# Patient Record
Sex: MALE | Race: WHITE | NOT HISPANIC OR LATINO | Employment: OTHER | URBAN - METROPOLITAN AREA
[De-identification: names, ages, dates, MRNs, and addresses within clinical notes are randomized per-mention and may not be internally consistent; named-entity substitution may affect disease eponyms.]

---

## 2017-05-01 ENCOUNTER — OFFICE VISIT (OUTPATIENT)
Dept: FAMILY MEDICINE | Facility: CLINIC | Age: 62
End: 2017-05-01
Payer: COMMERCIAL

## 2017-05-01 VITALS
OXYGEN SATURATION: 96 % | HEIGHT: 73 IN | BODY MASS INDEX: 30.45 KG/M2 | DIASTOLIC BLOOD PRESSURE: 98 MMHG | HEART RATE: 90 BPM | TEMPERATURE: 98 F | WEIGHT: 229.75 LBS | SYSTOLIC BLOOD PRESSURE: 142 MMHG

## 2017-05-01 DIAGNOSIS — E66.9 OBESITY (BMI 30.0-34.9): ICD-10-CM

## 2017-05-01 DIAGNOSIS — R10.30 LOWER ABDOMINAL PAIN: ICD-10-CM

## 2017-05-01 DIAGNOSIS — K29.00 ACUTE GASTRITIS WITHOUT HEMORRHAGE, UNSPECIFIED GASTRITIS TYPE: ICD-10-CM

## 2017-05-01 DIAGNOSIS — E78.00 PURE HYPERCHOLESTEROLEMIA: ICD-10-CM

## 2017-05-01 DIAGNOSIS — R03.0 ELEVATED BLOOD PRESSURE READING WITHOUT DIAGNOSIS OF HYPERTENSION: Primary | ICD-10-CM

## 2017-05-01 PROCEDURE — 99214 OFFICE O/P EST MOD 30 MIN: CPT | Mod: S$GLB,,, | Performed by: INTERNAL MEDICINE

## 2017-05-01 PROCEDURE — 99999 PR PBB SHADOW E&M-EST. PATIENT-LVL III: CPT | Mod: PBBFAC,,, | Performed by: INTERNAL MEDICINE

## 2017-05-01 PROCEDURE — 1160F RVW MEDS BY RX/DR IN RCRD: CPT | Mod: S$GLB,,, | Performed by: INTERNAL MEDICINE

## 2017-05-01 NOTE — ASSESSMENT & PLAN NOTE
The patient is asked to make an attempt to improve diet and exercise patterns to aid in medical management of this problem.

## 2017-05-01 NOTE — PROGRESS NOTES
Chief Complaint  Chief Complaint   Patient presents with    Establish Care    Abdominal Pain       HPI  Ron Christina is a 61 y.o. male with multiple medical diagnoses as listed in the medical history and problem list that presents for abdominal pain for years.  Pt is known to me with his last appointment 11/16/2016.  The patient was previously followed by one of my partners that is no longer at this practice .  I have reviewed their most recent previous OV with their previous PCP, most recent labs and most recent imaging studies and interpreted them myself.  I had met him once before for low back pain.  He has a history of low abdominal pain that started in 2015.  He drives a lot for work and also sits for prolonged periods of time.  Feels that the pain is at his belt line.  He rates the pain at a <1/10.  No numbness or tingling.  Denies any swelling from this similar to his previous hernia repair.  Denies unintentional weight loss, BRBPR, melena, pelvic pain, severe back pain.       PAST MEDICAL HISTORY:  Past Medical History:   Diagnosis Date    Allergy        PAST SURGICAL HISTORY:  Past Surgical History:   Procedure Laterality Date    HERNIA REPAIR         SOCIAL HISTORY:  Social History     Social History    Marital status:      Spouse name: N/A    Number of children: N/A    Years of education: N/A     Occupational History    Not on file.     Social History Main Topics    Smoking status: Never Smoker    Smokeless tobacco: Never Used    Alcohol use Yes    Drug use: No    Sexual activity: Not on file     Other Topics Concern    Not on file     Social History Narrative    No narrative on file       FAMILY HISTORY:  Family History   Problem Relation Age of Onset    Alcohol abuse Mother     Asthma Father        ALLERGIES AND MEDICATIONS: updated and reviewed.  Review of patient's allergies indicates:   Allergen Reactions    Penicillins Other (See Comments)     Current Outpatient  "Prescriptions   Medication Sig Dispense Refill    diclofenac (VOLTAREN) 50 MG EC tablet Take 1 tablet (50 mg total) by mouth 2 (two) times daily. Take with food 60 tablet 1    dietary supplement Powd Take by mouth.      pravastatin (PRAVACHOL) 20 MG tablet Take 1 tablet (20 mg total) by mouth once daily. 90 tablet 3     No current facility-administered medications for this visit.          ROS  Review of Systems   Constitutional: Negative for chills, fever and unexpected weight change.   HENT: Negative for congestion, dental problem, ear pain, hearing loss, rhinorrhea, sore throat and trouble swallowing.    Eyes: Negative for discharge, redness and visual disturbance.   Respiratory: Negative for cough, chest tightness, shortness of breath and wheezing.    Cardiovascular: Negative for chest pain, palpitations and leg swelling.   Gastrointestinal: Positive for abdominal pain. Negative for constipation, diarrhea, nausea and vomiting.   Endocrine: Negative for polydipsia, polyphagia and polyuria.   Genitourinary: Negative for decreased urine volume, dysuria and hematuria.   Neurological: Negative for dizziness, weakness, light-headedness and headaches.   Psychiatric/Behavioral: Negative for sleep disturbance.           Physical Exam  Vitals:    05/01/17 0940   BP: (!) 142/98   BP Location: Left arm   Patient Position: Sitting   BP Method: Manual   Pulse: 90   Temp: 97.8 °F (36.6 °C)   TempSrc: Oral   SpO2: 96%   Weight: 104.2 kg (229 lb 11.5 oz)   Height: 6' 1" (1.854 m)    Body mass index is 30.31 kg/(m^2).  Weight: 104.2 kg (229 lb 11.5 oz)   Height: 6' 1" (185.4 cm)   General appearance: alert, appears stated age, cooperative and no distress  Head: Normocephalic, without obvious abnormality, atraumatic  Eyes: PERRL EOMI conjunctiva clear  Neck: no adenopathy, no carotid bruit, no JVD, supple, symmetrical, trachea midline and thyroid not enlarged, symmetric, no tenderness/mass/nodules  Lungs: clear to auscultation " bilaterally  Heart: regular rate and rhythm, S1, S2 normal, no murmur, click, rub or gallop  Abdomen: soft BS+  mild TTP in the epigastric region without rebound or guarding.  Otherwise no TTP.  no distention   Extremities: extremities normal, atraumatic, no cyanosis or edema  Pulses: 2+ and symmetric  Neurologic: Grossly normal        Health Maintenance       Date Due Completion Date    TETANUS VACCINE 7/28/1972 ---    Colonoscopy 7/28/2004 ---    Influenza Vaccine 8/1/2017 11/16/2016 (Declined)    Override on 11/16/2016: Declined    Override on 9/16/2015: Declined    Lipid Panel 10/21/2021 10/21/2016            Assessment & Plan  Problem List Items Addressed This Visit        Fluids/Electrolytes/Nutrition/GI    Hyperlipidemia    Current Assessment & Plan     Not taking his statin.  He has been working on diet and exercise.  Recheck at routine physical         Obesity (BMI 30.0-34.9)    Current Assessment & Plan     The patient is asked to make an attempt to improve diet and exercise patterns to aid in medical management of this problem.             Other    Elevated blood pressure reading without diagnosis of hypertension - Primary    Overview     Good home BP log         Current Assessment & Plan     Patient is asked to monitor BP at home or work, several times per month and return with written values at next office visit.             Other Visit Diagnoses     Lower abdominal pain    -  Unclear etiology at this time.  Leading hypothesis is some femoral nerve irritation.  Monitor.  If worsens or fails to improve will get CT abdomen.         Acute gastritis - counseled on trial of H2 blockers. He prefers to try Probiotics first which I feel is safe.       Health Maintenance reviewed, Reports C-scope at 55 y/o.  Requesting records. .    Follow-up: Return in about 6 months (around 11/1/2017) for Routine Physical.

## 2017-05-01 NOTE — ASSESSMENT & PLAN NOTE
Patient is asked to monitor BP at home or work, several times per month and return with written values at next office visit.

## 2017-06-20 ENCOUNTER — OFFICE VISIT (OUTPATIENT)
Dept: FAMILY MEDICINE | Facility: CLINIC | Age: 62
End: 2017-06-20
Payer: COMMERCIAL

## 2017-06-20 ENCOUNTER — PATIENT MESSAGE (OUTPATIENT)
Dept: FAMILY MEDICINE | Facility: CLINIC | Age: 62
End: 2017-06-20

## 2017-06-20 ENCOUNTER — TELEPHONE (OUTPATIENT)
Dept: FAMILY MEDICINE | Facility: CLINIC | Age: 62
End: 2017-06-20

## 2017-06-20 ENCOUNTER — HOSPITAL ENCOUNTER (OUTPATIENT)
Dept: RADIOLOGY | Facility: HOSPITAL | Age: 62
Discharge: HOME | End: 2017-06-20
Attending: INTERNAL MEDICINE
Payer: COMMERCIAL

## 2017-06-20 ENCOUNTER — LAB VISIT (OUTPATIENT)
Dept: LAB | Facility: HOSPITAL | Age: 62
End: 2017-06-20
Attending: INTERNAL MEDICINE
Payer: COMMERCIAL

## 2017-06-20 VITALS
TEMPERATURE: 98 F | SYSTOLIC BLOOD PRESSURE: 124 MMHG | OXYGEN SATURATION: 98 % | HEIGHT: 72 IN | BODY MASS INDEX: 32.76 KG/M2 | HEART RATE: 101 BPM | DIASTOLIC BLOOD PRESSURE: 90 MMHG | WEIGHT: 241.88 LBS

## 2017-06-20 DIAGNOSIS — M79.89 RIGHT LEG SWELLING: ICD-10-CM

## 2017-06-20 DIAGNOSIS — M79.661 RIGHT CALF PAIN: ICD-10-CM

## 2017-06-20 DIAGNOSIS — I82.443 ACUTE DEEP VEIN THROMBOSIS (DVT) OF TIBIAL VEIN OF BOTH LOWER EXTREMITIES: ICD-10-CM

## 2017-06-20 DIAGNOSIS — M79.661 RIGHT CALF PAIN: Primary | ICD-10-CM

## 2017-06-20 LAB
ALBUMIN SERPL BCP-MCNC: 3.6 G/DL
ALP SERPL-CCNC: 64 U/L
ALT SERPL W/O P-5'-P-CCNC: 28 U/L
ANION GAP SERPL CALC-SCNC: 11 MMOL/L
AST SERPL-CCNC: 22 U/L
BASOPHILS # BLD AUTO: 0.05 K/UL
BASOPHILS NFR BLD: 0.6 %
BILIRUB SERPL-MCNC: 0.7 MG/DL
BUN SERPL-MCNC: 24 MG/DL
CALCIUM SERPL-MCNC: 9.6 MG/DL
CHLORIDE SERPL-SCNC: 107 MMOL/L
CO2 SERPL-SCNC: 22 MMOL/L
CREAT SERPL-MCNC: 1 MG/DL
DIFFERENTIAL METHOD: ABNORMAL
EOSINOPHIL # BLD AUTO: 0.3 K/UL
EOSINOPHIL NFR BLD: 3.2 %
ERYTHROCYTE [DISTWIDTH] IN BLOOD BY AUTOMATED COUNT: 13.4 %
EST. GFR  (AFRICAN AMERICAN): >60 ML/MIN/1.73 M^2
EST. GFR  (NON AFRICAN AMERICAN): >60 ML/MIN/1.73 M^2
GLUCOSE SERPL-MCNC: 95 MG/DL
HCT VFR BLD AUTO: 48.9 %
HGB BLD-MCNC: 16.7 G/DL
LYMPHOCYTES # BLD AUTO: 2.4 K/UL
LYMPHOCYTES NFR BLD: 27.4 %
MCH RBC QN AUTO: 32.7 PG
MCHC RBC AUTO-ENTMCNC: 34.2 %
MCV RBC AUTO: 96 FL
MONOCYTES # BLD AUTO: 0.9 K/UL
MONOCYTES NFR BLD: 10.3 %
NEUTROPHILS # BLD AUTO: 5 K/UL
NEUTROPHILS NFR BLD: 58 %
PLATELET # BLD AUTO: 198 K/UL
PMV BLD AUTO: 10.5 FL
POTASSIUM SERPL-SCNC: 4.7 MMOL/L
PROT SERPL-MCNC: 7.5 G/DL
RBC # BLD AUTO: 5.11 M/UL
SODIUM SERPL-SCNC: 140 MMOL/L
WBC # BLD AUTO: 8.62 K/UL

## 2017-06-20 PROCEDURE — 93971 EXTREMITY STUDY: CPT | Mod: TC

## 2017-06-20 PROCEDURE — 36415 COLL VENOUS BLD VENIPUNCTURE: CPT | Mod: PO

## 2017-06-20 PROCEDURE — 80053 COMPREHEN METABOLIC PANEL: CPT

## 2017-06-20 PROCEDURE — 99214 OFFICE O/P EST MOD 30 MIN: CPT | Mod: S$GLB,,, | Performed by: INTERNAL MEDICINE

## 2017-06-20 PROCEDURE — 85025 COMPLETE CBC W/AUTO DIFF WBC: CPT

## 2017-06-20 PROCEDURE — 99999 PR PBB SHADOW E&M-EST. PATIENT-LVL III: CPT | Mod: PBBFAC,,, | Performed by: INTERNAL MEDICINE

## 2017-06-20 PROCEDURE — 93971 EXTREMITY STUDY: CPT | Mod: 26,,, | Performed by: RADIOLOGY

## 2017-06-20 NOTE — PROGRESS NOTES
Chief Complaint  Chief Complaint   Patient presents with    Leg Pain     Right/2 days    Heel Pain     1 day       HPI  Ron Christina is a 61 y.o. male with multiple medical diagnoses as listed in the medical history and problem list that presents for right leg and heel pain for 2 days.  Pt is known to me with his last appointment 5/1/2017.  Reports that he was recently traveling on a cruise and by plane.  Plane ride was ~2 hours.  He also got a severe sunburn of the BLE but R>L.  2 days ago he noticed some sudden onset of calf pain while standing up and grilling.  Noted that RLE was swollen as well.  Wife measured it and it was 1.5inches larger.  Pain is in the calf and goes down to the plantar fascia.  No CP, palpitations, SOB.  He took to aleve PM without much relief.  Swelling has improved slightly.       PAST MEDICAL HISTORY:  Past Medical History:   Diagnosis Date    Allergy        PAST SURGICAL HISTORY:  Past Surgical History:   Procedure Laterality Date    HERNIA REPAIR         SOCIAL HISTORY:  Social History     Social History    Marital status:      Spouse name: N/A    Number of children: N/A    Years of education: N/A     Occupational History    Not on file.     Social History Main Topics    Smoking status: Never Smoker    Smokeless tobacco: Never Used    Alcohol use Yes    Drug use: No    Sexual activity: Not on file     Other Topics Concern    Not on file     Social History Narrative    No narrative on file       FAMILY HISTORY:  Family History   Problem Relation Age of Onset    Alcohol abuse Mother     Asthma Father        ALLERGIES AND MEDICATIONS: updated and reviewed.  Review of patient's allergies indicates:   Allergen Reactions    Penicillins Other (See Comments)     Current Outpatient Prescriptions   Medication Sig Dispense Refill    diclofenac (VOLTAREN) 50 MG EC tablet Take 1 tablet (50 mg total) by mouth 2 (two) times daily. Take with food 60 tablet 1    dietary  supplement Powd Take by mouth.      pravastatin (PRAVACHOL) 20 MG tablet Take 1 tablet (20 mg total) by mouth once daily. 90 tablet 3     No current facility-administered medications for this visit.          ROS  Review of Systems   Constitutional: Negative for chills, fever and unexpected weight change.   Respiratory: Negative for cough, chest tightness, shortness of breath and wheezing.    Cardiovascular: Positive for leg swelling. Negative for chest pain and palpitations.   Gastrointestinal: Negative for abdominal pain, constipation, diarrhea, nausea and vomiting.   Musculoskeletal: Positive for myalgias. Negative for arthralgias.   Skin: Positive for color change. Negative for rash.   Neurological: Negative for dizziness, weakness, light-headedness and headaches.           Physical Exam  Vitals:    06/20/17 0822   BP: (!) 124/90   BP Location: Left arm   Patient Position: Sitting   BP Method: Manual   Pulse: 101   Temp: 97.8 °F (36.6 °C)   TempSrc: Oral   SpO2: 98%   Weight: 109.7 kg (241 lb 13.5 oz)   Height: 6' (1.829 m)    Body mass index is 32.8 kg/m².  Weight: 109.7 kg (241 lb 13.5 oz)   Height: 6' (182.9 cm)   General appearance: alert, appears stated age, cooperative and no distress  Head: Normocephalic, without obvious abnormality, atraumatic  Eyes: PERRL EOMI conjunctiva clear  Lungs: regular rate and pattern symmetric excursion.  normal work of breathing  Heart: regular rate and rhythm  Extremities: + swelling of the RLE compared to the LLE.  Difference of roughly 1-1.5 inches.  Mild TTP of the calf and mild pain with dorsiflexion of the foot.   No pitting edema.  No direct stevie tenderness  Pulses: 2+ and symmetric  Skin: BLE peeling from sunburn R>L.  RLE also erythematous but unclear if this is due to sunburn  Neurologic: Mental status: Alert, oriented, thought content appropriate  Sensory: normal  Motor:grossly normal  Coordination: normal  Gait: Normal  Symmetric facial movements palate elevated  symmetrically tongue midline       Health Maintenance       Date Due Completion Date    TETANUS VACCINE 07/28/1972 ---    Colonoscopy 07/28/2004 ---    Influenza Vaccine 08/01/2017 11/16/2016 (Declined)    Override on 11/16/2016: Declined    Override on 9/16/2015: Declined    Lipid Panel 10/21/2021 10/21/2016            Assessment & Plan  Problem List Items Addressed This Visit     None      Visit Diagnoses     Right calf pain    -  Primary  -    Check stat US given sudden onset of pain and swelling.  Color change difficult to assess given recent sunburn.  I have already counseled the patient on direct thrombin inhibitors should this be needed.  Will plan to check CBC and CMP and start him on therapy as an outpatient unless there is a very large thrombotic load.    Relevant Orders    US Lower Extremity Veins Right    Right leg swelling    -  As above    Relevant Orders    US Lower Extremity Veins Right            Health Maintenance reviewed, deferred.    Follow-up: No Follow-up on file.

## 2017-06-20 NOTE — TELEPHONE ENCOUNTER
I called and spoke to the patient regarding his US finding on DVT.  We will start him on Xarelto and see him back on July 6th at 11AM for f/u and repeat labs

## 2017-06-21 ENCOUNTER — PATIENT MESSAGE (OUTPATIENT)
Dept: FAMILY MEDICINE | Facility: CLINIC | Age: 62
End: 2017-06-21

## 2017-06-21 DIAGNOSIS — I82.443 ACUTE DEEP VEIN THROMBOSIS (DVT) OF TIBIAL VEIN OF BOTH LOWER EXTREMITIES: Primary | ICD-10-CM

## 2017-06-21 RX ORDER — TRAMADOL HYDROCHLORIDE 50 MG/1
TABLET ORAL
Qty: 45 TABLET | Refills: 0 | Status: SHIPPED | OUTPATIENT
Start: 2017-06-21 | End: 2017-07-06

## 2017-06-22 ENCOUNTER — PATIENT MESSAGE (OUTPATIENT)
Dept: FAMILY MEDICINE | Facility: CLINIC | Age: 62
End: 2017-06-22

## 2017-06-22 ENCOUNTER — TELEPHONE (OUTPATIENT)
Dept: FAMILY MEDICINE | Facility: CLINIC | Age: 62
End: 2017-06-22

## 2017-06-22 NOTE — TELEPHONE ENCOUNTER
----- Message from Chris Bartlett sent at 6/20/2017  2:11 PM CDT -----  Contact: Self  Pt states he has blood clots and would like to discuss with . Pt can be reached @ [phone]

## 2017-06-26 ENCOUNTER — TELEPHONE (OUTPATIENT)
Dept: FAMILY MEDICINE | Facility: CLINIC | Age: 62
End: 2017-06-26

## 2017-06-26 ENCOUNTER — PATIENT MESSAGE (OUTPATIENT)
Dept: FAMILY MEDICINE | Facility: CLINIC | Age: 62
End: 2017-06-26

## 2017-06-26 NOTE — TELEPHONE ENCOUNTER
Patient advised he can resume his daily activities, and he can consume alcohol in moderation per Dr. Rae.

## 2017-06-26 NOTE — TELEPHONE ENCOUNTER
Patient advised he can resume his daily activities, and he can consume alcohol in moderation per Dr. Rae.            Ron Rae MD 24 minutes ago (10:50 AM)         Hey Doc, I know me again. 2 questions. 1- is there any problem with me getting in the pool? My wife is concerned about the water pressure on my leg. 2- Need to know if I am able to consume alcohol while taking this medicine. Also when will I be able to cut my grass, my wife is concerned about this. She tells me NO.     Ron Christina

## 2017-06-30 ENCOUNTER — OFFICE VISIT (OUTPATIENT)
Dept: FAMILY MEDICINE | Facility: CLINIC | Age: 62
End: 2017-06-30
Payer: COMMERCIAL

## 2017-06-30 ENCOUNTER — TELEPHONE (OUTPATIENT)
Dept: FAMILY MEDICINE | Facility: CLINIC | Age: 62
End: 2017-06-30

## 2017-06-30 VITALS
SYSTOLIC BLOOD PRESSURE: 129 MMHG | TEMPERATURE: 98 F | BODY MASS INDEX: 32.76 KG/M2 | DIASTOLIC BLOOD PRESSURE: 78 MMHG | OXYGEN SATURATION: 95 % | WEIGHT: 241.88 LBS | HEIGHT: 72 IN | HEART RATE: 99 BPM

## 2017-06-30 DIAGNOSIS — E78.00 PURE HYPERCHOLESTEROLEMIA: ICD-10-CM

## 2017-06-30 DIAGNOSIS — R07.9 CHEST PAIN, UNSPECIFIED TYPE: Primary | ICD-10-CM

## 2017-06-30 DIAGNOSIS — I82.443 ACUTE DEEP VEIN THROMBOSIS (DVT) OF TIBIAL VEIN OF BOTH LOWER EXTREMITIES: ICD-10-CM

## 2017-06-30 PROCEDURE — 99214 OFFICE O/P EST MOD 30 MIN: CPT | Mod: S$GLB,,, | Performed by: NURSE PRACTITIONER

## 2017-06-30 PROCEDURE — 93010 ELECTROCARDIOGRAM REPORT: CPT | Mod: S$GLB,,, | Performed by: INTERNAL MEDICINE

## 2017-06-30 PROCEDURE — 93005 ELECTROCARDIOGRAM TRACING: CPT | Mod: S$GLB,,, | Performed by: NURSE PRACTITIONER

## 2017-06-30 PROCEDURE — 99999 PR PBB SHADOW E&M-EST. PATIENT-LVL IV: CPT | Mod: PBBFAC,,, | Performed by: NURSE PRACTITIONER

## 2017-06-30 NOTE — TELEPHONE ENCOUNTER
C/o left chest dull pain intermittent 1 on pain scale.patient states he's concerned thinking it may be due to Xarelto. He has no radiation,sob he states this has been going on at least 3 days advised patient if pain increases go to ED. Please advise

## 2017-06-30 NOTE — PATIENT INSTRUCTIONS
Noncardiac Chest Pain    Based on your visit today, the health care provider doesnt know what is causing your chest pain. In most cases, people who come to the emergency department with chest pain dont have a problem with their heart. Instead, the pain is caused by other conditions. These may be problems with the lungs, muscles, bones, digestive tract, nerves, or mental health.  Lung problems  · Inflammation around the lungs (pleurisy)  · Collapsed lung (pneumothorax)  · Fluid around the lungs (pleural effusion)  · Lung cancer. This is a rare cause of chest pain.  Muscle or bone problems  · Inflamed cartilage between the ribs (pleurisy)  · Fibromyalgia  · Rheumatoid arthritis  Digestive system problems  · Reflux  · Stomach ulcer  · Spasms of the esophagus  · Gall stones  · Gallbladder inflammation  Mental health conditions  · Panic or anxiety attacks  · Emotional distress  Your condition doesnt seem serious and your pain doesnt appear to be coming from your heart. But sometimes the signs of a serious problem take more time to appear. Watch for the warning signs listed below.  Home care  Follow these guidelines when caring for yourself at home:  · Rest today and avoid strenuous activity.  · Take any prescribed medicine as directed.  Follow-up care  Follow up with your health care provider, or as advised, if you dont start to feel better within 24 hours.  When to seek medical advice  Call your health care provider right away if any of these occur:  · A change in the type of pain. Call if it feels different, becomes more serious, lasts longer, or begins to spread into your shoulder, arm, neck, jaw, or back.  · Shortness of breath  · You feel more pain when you breathe  · Cough with dark-colored mucus or blood  · Weakness, dizziness, or fainting  · Fever of 100.4ºF (38ºC) or higher, or as directed by your health care provider  · Swelling, pain, or redness in one leg  Date Last Reviewed: 11/24/2014  © 2865-1511  [employer]. [address] [place] [address], [place] [address]. All rights reserved. This information is not intended as a substitute for professional medical care. Always follow your healthcare professional's instructions.

## 2017-06-30 NOTE — PROGRESS NOTES
"History of Present Illness   Ron Christina is a 61 y.o. man with medical history as listed below who presents today with left sided chest pain x3-4 days. Pain is located on the left side and along left mid-axillary region. Pain is intermittent and described as a dull ache, "mild muscle spasm." The pain does not radiate. The pain lasts seconds and occurs about three to four times per day. The pain is not associated with shortness of breath, palpitations, lightheadedness, or syncope. Of note, patient was diagnosed with right DVT on 6/20/17. He is currently taking Xarelto. He has had no complications since diagnosis. He states that some years ago he had similar symptoms and was told it may be arthritis. He normally takes NSAIDS for this pain but has been unable to due to being on Xarelto. He has no additional complaints and is otherwise healthy on today's visit.      Past Medical History:   Diagnosis Date    Allergy        Past Surgical History:   Procedure Laterality Date    HERNIA REPAIR         Social History     Social History    Marital status:      Spouse name: N/A    Number of children: N/A    Years of education: N/A     Social History Main Topics    Smoking status: Never Smoker    Smokeless tobacco: Never Used    Alcohol use Yes    Drug use: No    Sexual activity: Not Asked     Other Topics Concern    None     Social History Narrative    None       Family History   Problem Relation Age of Onset    Alcohol abuse Mother     Asthma Father        Review of Systems  Review of Systems   Respiratory: Negative for shortness of breath.    Cardiovascular: Positive for chest pain and leg swelling (right). Negative for palpitations, orthopnea and claudication.   Neurological: Negative for dizziness and loss of consciousness.     A complete review of systems was otherwise negative.    Physical Exam  /78 (BP Location: Right arm, Patient Position: Sitting, BP Method: Manual)   Pulse 99   Temp " 98.3 °F (36.8 °C) (Oral)   Ht 6' (1.829 m)   Wt 109.7 kg (241 lb 13.5 oz)   SpO2 95%   BMI 32.80 kg/m²   General appearance: alert, appears stated age, cooperative and no distress  Lungs: clear to auscultation bilaterally  Chest wall: left sided chest wall tenderness, reproducible with movement and touch  Heart: regular rate and rhythm, S1, S2 normal, no murmur, click, rub or gallop  Extremities: Positive DVT to RLE, Right calf/ankle swelling, decresed from last visit.  Pulses: 2+ and symmetric  Skin: Skin color, texture, turgor normal. No rashes or lesions  Neurologic: Grossly normal    Assessment/Plan  Chest pain, unspecified type  EKG shows NSR with no ST or T-wave abnormality.  Pain more consistent with musculoskeletal etiology.  We discussed at length ER precautions.  Follow-up with Dr. Rae as scheduled on 7/6/17.  -     IN OFFICE EKG 12-LEAD (to Muse)    Acute deep vein thrombosis (DVT) of tibial vein of both lower extremities  The current medical regimen is effective;  continue present plan and medications.  Follow-up with Dr. Rae as scheduled on 7/6/17 for re-evaluation.    Pure hypercholesterolemia  The current medical regimen is effective;  continue present plan and medications.      Go to ER for any change in quality of chest pain especially if associated with shortness of breath, palpitations, dizziness, LOC, or other worrisome symptoms.  He has verbalized understanding and is in agreement with plan of care.  Return in one week for follow-up with PCP

## 2017-06-30 NOTE — TELEPHONE ENCOUNTER
----- Message from Elaine Augila sent at 6/30/2017  2:36 PM CDT -----  Contact: Self   Patient says he need to speak with you to discuss some aches he has been having . Please call at   [phone]

## 2017-07-06 ENCOUNTER — LAB VISIT (OUTPATIENT)
Dept: LAB | Facility: HOSPITAL | Age: 62
End: 2017-07-06
Attending: INTERNAL MEDICINE
Payer: COMMERCIAL

## 2017-07-06 ENCOUNTER — OFFICE VISIT (OUTPATIENT)
Dept: FAMILY MEDICINE | Facility: CLINIC | Age: 62
End: 2017-07-06
Payer: COMMERCIAL

## 2017-07-06 VITALS
OXYGEN SATURATION: 97 % | BODY MASS INDEX: 33.12 KG/M2 | HEIGHT: 72 IN | TEMPERATURE: 98 F | WEIGHT: 244.5 LBS | DIASTOLIC BLOOD PRESSURE: 86 MMHG | HEART RATE: 86 BPM | SYSTOLIC BLOOD PRESSURE: 124 MMHG

## 2017-07-06 DIAGNOSIS — I82.441 ACUTE DEEP VEIN THROMBOSIS (DVT) OF TIBIAL VEIN OF RIGHT LOWER EXTREMITY: ICD-10-CM

## 2017-07-06 DIAGNOSIS — I82.441 ACUTE DEEP VEIN THROMBOSIS (DVT) OF TIBIAL VEIN OF RIGHT LOWER EXTREMITY: Primary | ICD-10-CM

## 2017-07-06 LAB
ANION GAP SERPL CALC-SCNC: 9 MMOL/L
BASOPHILS # BLD AUTO: 0.03 K/UL
BASOPHILS NFR BLD: 0.4 %
BUN SERPL-MCNC: 25 MG/DL
CALCIUM SERPL-MCNC: 9.7 MG/DL
CHLORIDE SERPL-SCNC: 106 MMOL/L
CO2 SERPL-SCNC: 22 MMOL/L
CREAT SERPL-MCNC: 0.9 MG/DL
DIFFERENTIAL METHOD: ABNORMAL
EOSINOPHIL # BLD AUTO: 0.3 K/UL
EOSINOPHIL NFR BLD: 3.4 %
ERYTHROCYTE [DISTWIDTH] IN BLOOD BY AUTOMATED COUNT: 13.5 %
EST. GFR  (AFRICAN AMERICAN): >60 ML/MIN/1.73 M^2
EST. GFR  (NON AFRICAN AMERICAN): >60 ML/MIN/1.73 M^2
GLUCOSE SERPL-MCNC: 87 MG/DL
HCT VFR BLD AUTO: 46.3 %
HGB BLD-MCNC: 15.6 G/DL
LYMPHOCYTES # BLD AUTO: 3 K/UL
LYMPHOCYTES NFR BLD: 37.3 %
MCH RBC QN AUTO: 32.4 PG
MCHC RBC AUTO-ENTMCNC: 33.7 %
MCV RBC AUTO: 96 FL
MONOCYTES # BLD AUTO: 0.8 K/UL
MONOCYTES NFR BLD: 10 %
NEUTROPHILS # BLD AUTO: 3.9 K/UL
NEUTROPHILS NFR BLD: 48.5 %
PLATELET # BLD AUTO: 234 K/UL
PMV BLD AUTO: 10.6 FL
POTASSIUM SERPL-SCNC: 4.4 MMOL/L
RBC # BLD AUTO: 4.81 M/UL
SODIUM SERPL-SCNC: 137 MMOL/L
WBC # BLD AUTO: 7.98 K/UL

## 2017-07-06 PROCEDURE — 99999 PR PBB SHADOW E&M-EST. PATIENT-LVL III: CPT | Mod: PBBFAC,,, | Performed by: INTERNAL MEDICINE

## 2017-07-06 PROCEDURE — 36415 COLL VENOUS BLD VENIPUNCTURE: CPT | Mod: PO

## 2017-07-06 PROCEDURE — 99214 OFFICE O/P EST MOD 30 MIN: CPT | Mod: S$GLB,,, | Performed by: INTERNAL MEDICINE

## 2017-07-06 PROCEDURE — 85025 COMPLETE CBC W/AUTO DIFF WBC: CPT

## 2017-07-06 PROCEDURE — 80048 BASIC METABOLIC PNL TOTAL CA: CPT

## 2017-07-06 NOTE — PATIENT INSTRUCTIONS
We are changing to the once daily dosing once you take all of the 15mg tabs.  Your once daily dose will be 20mg tablets.

## 2017-07-06 NOTE — PROGRESS NOTES
Chief Complaint  Chief Complaint   Patient presents with    Deep Vein Thrombosis     F/U       HPI  Ron Christina is a 61 y.o. male with multiple medical diagnoses as listed in the medical history and problem list that presents for DVT follow up.  Pt is known to me with his last appointment 6/30/2017.  Seen at that time for MSK chest pain.  He iwll need to change Xarelto dosing next week to once daily dosing.  No CP, SOB, palpitations, hematuria, BRBPR.  He is walking and exercising.  Some mild swelling of the RLE with prolonged standing that resolves with elevation.  No pain.       PAST MEDICAL HISTORY:  Past Medical History:   Diagnosis Date    Allergy     Deep vein thrombosis        PAST SURGICAL HISTORY:  Past Surgical History:   Procedure Laterality Date    HERNIA REPAIR         SOCIAL HISTORY:  Social History     Social History    Marital status:      Spouse name: N/A    Number of children: N/A    Years of education: N/A     Occupational History    Not on file.     Social History Main Topics    Smoking status: Never Smoker    Smokeless tobacco: Never Used    Alcohol use Yes    Drug use: No    Sexual activity: Not on file     Other Topics Concern    Not on file     Social History Narrative    No narrative on file       FAMILY HISTORY:  Family History   Problem Relation Age of Onset    Alcohol abuse Mother     Asthma Father        ALLERGIES AND MEDICATIONS: updated and reviewed.  Review of patient's allergies indicates:   Allergen Reactions    Penicillins Other (See Comments)     Current Outpatient Prescriptions   Medication Sig Dispense Refill    rivaroxaban (XARELTO) 15 mg Tab Take 1 tablet (15 mg total) by mouth 2 (two) times daily with meals. 42 tablet 0    rivaroxaban (XARELTO) 20 mg Tab Take 1 tablet (20 mg total) by mouth once daily. 90 tablet 0     No current facility-administered medications for this visit.          ROS  Review of Systems   Constitutional: Negative for  chills, fever and unexpected weight change.   HENT: Negative for trouble swallowing.    Respiratory: Negative for cough, chest tightness, shortness of breath and wheezing.    Cardiovascular: Positive for leg swelling. Negative for chest pain and palpitations.   Gastrointestinal: Negative for abdominal pain, constipation, diarrhea, nausea and vomiting.   Endocrine: Negative for polydipsia.   Genitourinary: Negative for decreased urine volume, dysuria and hematuria.   Musculoskeletal: Negative for arthralgias and myalgias.   Skin: Negative for color change and rash.   Neurological: Negative for dizziness, weakness, light-headedness and headaches.           Physical Exam  Vitals:    07/06/17 1057 07/06/17 1125   BP: (!) 150/100 124/86   BP Location: Left arm    Patient Position: Sitting    BP Method: Manual    Pulse: 86    Temp: 97.9 °F (36.6 °C)    TempSrc: Oral    SpO2: 97%    Weight: 110.9 kg (244 lb 7.8 oz)    Height: 6' (1.829 m)     Body mass index is 33.16 kg/m².  Weight: 110.9 kg (244 lb 7.8 oz)   Height: 6' (182.9 cm)   General appearance: alert, appears stated age, cooperative and no distress  Head: Normocephalic, without obvious abnormality, atraumatic  Eyes: PERRL EOMI conjunctiva clear  Neck: no adenopathy, no carotid bruit, no JVD, supple, symmetrical, trachea midline and thyroid not enlarged, symmetric, no tenderness/mass/nodules  Lungs: clear to auscultation bilaterally  Heart: regular rate and rhythm, S1, S2 normal, no murmur, click, rub or gallop  Extremities: no ulcers, gangrene or trophic changes and RLE ~2cm larger than the LLE at the calf.  no TTP  Pulses: 2+ and symmetric  Neurologic: Mental status: Alert, oriented, thought content appropriate  Sensory: normal  Motor:grossly normal  Coordination: normal  Gait: Normal   Symmetric facial movements palate elevated symmetrically tongue midline         Health Maintenance       Date Due Completion Date    TETANUS VACCINE 07/28/1972 ---    Colonoscopy  07/28/2004 ---    Influenza Vaccine 08/01/2017 11/16/2016 (Declined)    Override on 11/16/2016: Declined    Override on 9/16/2015: Declined    Lipid Panel 10/21/2021 10/21/2016            Assessment & Plan  Problem List Items Addressed This Visit        Cardiac    Acute deep vein thrombosis (DVT) of tibial vein of right lower extremity - Primary    Current Assessment & Plan     Doing well.  Recheck labs.  Discussed with patient that his history is a bit unclear regarding whether this was a provoked or unprovoked DVT.  Will mostly likely plan for 6 months of anticoagulation then decide on stopping.  Also discussed possible Hematology referral.           Relevant Medications    rivaroxaban (XARELTO) 20 mg Tab    Other Relevant Orders    CBC auto differential    Basic metabolic panel      Other Visit Diagnoses    None.           Health Maintenance reviewed, deferred.    Follow-up: Return for 3-6 months.

## 2017-07-06 NOTE — ASSESSMENT & PLAN NOTE
Doing well.  Recheck labs.  Discussed with patient that his history is a bit unclear regarding whether this was a provoked or unprovoked DVT.  Will mostly likely plan for 6 months of anticoagulation then decide on stopping.  Also discussed possible Hematology referral.

## 2017-07-07 ENCOUNTER — OFFICE VISIT (OUTPATIENT)
Dept: FAMILY MEDICINE | Facility: CLINIC | Age: 62
End: 2017-07-07
Payer: COMMERCIAL

## 2017-07-07 VITALS
TEMPERATURE: 98 F | SYSTOLIC BLOOD PRESSURE: 134 MMHG | HEART RATE: 93 BPM | DIASTOLIC BLOOD PRESSURE: 102 MMHG | WEIGHT: 242.06 LBS | BODY MASS INDEX: 32.79 KG/M2 | OXYGEN SATURATION: 97 % | HEIGHT: 72 IN

## 2017-07-07 DIAGNOSIS — R03.0 ELEVATED BLOOD PRESSURE READING WITHOUT DIAGNOSIS OF HYPERTENSION: ICD-10-CM

## 2017-07-07 DIAGNOSIS — S83.421A SPRAIN OF LATERAL COLLATERAL LIGAMENT OF RIGHT KNEE, INITIAL ENCOUNTER: Primary | ICD-10-CM

## 2017-07-07 PROCEDURE — 99214 OFFICE O/P EST MOD 30 MIN: CPT | Mod: S$GLB,,, | Performed by: INTERNAL MEDICINE

## 2017-07-07 PROCEDURE — 99999 PR PBB SHADOW E&M-EST. PATIENT-LVL III: CPT | Mod: PBBFAC,,, | Performed by: INTERNAL MEDICINE

## 2017-07-07 NOTE — PROGRESS NOTES
Your lab results are stable.  Please continue your current medications and the dose change next week.  Please feel free to contact me with any questions or concerns.    Sincerely,  Selvin Rae  [email or web address]

## 2017-07-07 NOTE — PATIENT INSTRUCTIONS
Ice the knee for 15-20 minutes 2-3 times a day.  Tramadol is fine for pain control.      Salonpas makes a good over the counter anti-inflammatory patch or gel.

## 2017-07-07 NOTE — PROGRESS NOTES
"Chief Complaint  Chief Complaint   Patient presents with    Knee Pain     Right       HPI  Ron Christina is a 61 y.o. male with multiple medical diagnoses as listed in the medical history and problem list that presents for right knee pain since last night.  Pt is known to me with his last appointment 7/6/2017.  Was playing volleyball and landed funny and "tweaked" his right knee.  Pain with flexion.  Slightly swollen.  Felt ok while he had his knee brace on.  Worse this AM when he woke up to use the restroom.  No bruising.  Using his previous tramadol for pain control.        PAST MEDICAL HISTORY:  Past Medical History:   Diagnosis Date    Allergy     Deep vein thrombosis        PAST SURGICAL HISTORY:  Past Surgical History:   Procedure Laterality Date    HERNIA REPAIR         SOCIAL HISTORY:  Social History     Social History    Marital status:      Spouse name: N/A    Number of children: N/A    Years of education: N/A     Occupational History    Not on file.     Social History Main Topics    Smoking status: Never Smoker    Smokeless tobacco: Never Used    Alcohol use Yes    Drug use: No    Sexual activity: Not on file     Other Topics Concern    Not on file     Social History Narrative    No narrative on file       FAMILY HISTORY:  Family History   Problem Relation Age of Onset    Alcohol abuse Mother     Asthma Father        ALLERGIES AND MEDICATIONS: updated and reviewed.  Review of patient's allergies indicates:   Allergen Reactions    Penicillins Other (See Comments)     Current Outpatient Prescriptions   Medication Sig Dispense Refill    rivaroxaban (XARELTO) 15 mg Tab Take 1 tablet (15 mg total) by mouth 2 (two) times daily with meals. 42 tablet 0    rivaroxaban (XARELTO) 20 mg Tab Take 1 tablet (20 mg total) by mouth once daily. 90 tablet 0     No current facility-administered medications for this visit.          ROS  Review of Systems   Constitutional: Negative for chills, " fatigue and fever.   Cardiovascular: Negative for chest pain and palpitations.   Musculoskeletal: Positive for arthralgias, gait problem (due to pain) and joint swelling. Negative for back pain, myalgias and neck pain.   Skin: Negative for color change, pallor, rash and wound.   Neurological: Negative for weakness, numbness and headaches.           Physical Exam  Vitals:    07/07/17 1141   BP: (!) 134/102   BP Location: Left arm   Patient Position: Sitting   BP Method: Manual   Pulse: 93   Temp: 97.7 °F (36.5 °C)   TempSrc: Oral   SpO2: 97%   Weight: 109.8 kg (242 lb 1 oz)   Height: 6' (1.829 m)    Body mass index is 32.83 kg/m².  Weight: 109.8 kg (242 lb 1 oz)   Height: 6' (182.9 cm)   General appearance: alert, appears stated age, cooperative and no distress  Head: Normocephalic, without obvious abnormality, atraumatic  Eyes: PERRL EOMI conjunctiva clear  Extremities: extremities normal, atraumatic, no cyanosis or edema  Pulses: 2+ and symmetric  Skin: Skin color, texture, turgor normal. No rashes or lesions  Neurologic: Mental status: Alert, oriented, thought content appropriate  Sensory: normal  Motor:grossly normal  Coordination: normal  Gait: slight limp due to pain  Bilateral hips: No effusion erythema warmth or TTP.  FROM without pain.  Left knee: No effusion erythema warmth or TTP.  FROM without pain.  No MCL LCL or ACL laxity.  Right knee: Mild swelling.  + TTP over the LCL.  No erythema warmth.  FROM with pain on flexion.  No MCL LCL or ACL laxity. Cesar's negative.       Health Maintenance       Date Due Completion Date    TETANUS VACCINE 07/28/1972 ---    Colonoscopy 07/28/2004 ---    Influenza Vaccine 08/01/2017 11/16/2016 (Declined)    Override on 11/16/2016: Declined    Override on 9/16/2015: Declined    Lipid Panel 10/21/2021 10/21/2016            Assessment & Plan  Problem List Items Addressed This Visit        Other    Elevated blood pressure reading without diagnosis of hypertension     Overview     Good home BP log         Current Assessment & Plan     Multiple normal readings in the past.  In pain today.  Monitor           Other Visit Diagnoses     Sprain of lateral collateral ligament of right knee, initial encounter    -  Primary  -    RICE therapy.  May use tramadol script for pain.  Counseled on topical NSAIDs.  No signs of hemarthrosis today.             Health Maintenance reviewed, deferred.    Follow-up: Return if symptoms worsen or fail to improve.

## 2017-07-14 ENCOUNTER — PATIENT MESSAGE (OUTPATIENT)
Dept: FAMILY MEDICINE | Facility: CLINIC | Age: 62
End: 2017-07-14

## 2017-08-20 ENCOUNTER — PATIENT MESSAGE (OUTPATIENT)
Dept: FAMILY MEDICINE | Facility: CLINIC | Age: 62
End: 2017-08-20

## 2017-09-15 ENCOUNTER — OFFICE VISIT (OUTPATIENT)
Dept: FAMILY MEDICINE | Facility: CLINIC | Age: 62
End: 2017-09-15
Payer: COMMERCIAL

## 2017-09-15 VITALS
DIASTOLIC BLOOD PRESSURE: 75 MMHG | HEART RATE: 81 BPM | WEIGHT: 238.75 LBS | OXYGEN SATURATION: 97 % | TEMPERATURE: 99 F | BODY MASS INDEX: 32.34 KG/M2 | SYSTOLIC BLOOD PRESSURE: 130 MMHG | HEIGHT: 72 IN

## 2017-09-15 DIAGNOSIS — I82.441 ACUTE DEEP VEIN THROMBOSIS (DVT) OF TIBIAL VEIN OF RIGHT LOWER EXTREMITY: ICD-10-CM

## 2017-09-15 DIAGNOSIS — S46.212A STRAIN OF MUSCLE, FASCIA AND TENDON OF OTHER PARTS OF BICEPS, LEFT ARM, INITIAL ENCOUNTER: Primary | ICD-10-CM

## 2017-09-15 PROCEDURE — 99213 OFFICE O/P EST LOW 20 MIN: CPT | Mod: S$GLB,,, | Performed by: NURSE PRACTITIONER

## 2017-09-15 PROCEDURE — 99999 PR PBB SHADOW E&M-EST. PATIENT-LVL III: CPT | Mod: PBBFAC,,, | Performed by: NURSE PRACTITIONER

## 2017-09-15 PROCEDURE — 3008F BODY MASS INDEX DOCD: CPT | Mod: S$GLB,,, | Performed by: NURSE PRACTITIONER

## 2017-09-15 RX ORDER — DICLOFENAC SODIUM 10 MG/G
2 GEL TOPICAL 4 TIMES DAILY
Qty: 100 G | Refills: 0 | Status: SHIPPED | OUTPATIENT
Start: 2017-09-15 | End: 2018-01-16

## 2017-09-15 NOTE — PROGRESS NOTES
"History of Present Illness   Ron Christina is a 62 y.o. man with medical history as listed below who presents today for left arm pain. He states he was playing volleyball yesterday and injured arm near antecubital area going to a low spike. He states he felt immediate pain with a "shock like" sensation. He immediately took Tylenol and started to ice the area. He states today pain is only present with external rotation of the arm and is only about a 2/10. He describes the pain as a tightness. There is minimal swelling. There is no limitation to ROM of shoulder, elbow, or wrist. He denies bruising, numbness, or tingling. He has no additional complaints and is otherwise healthy on today's visit.      Past Medical History:   Diagnosis Date    Allergy     Deep vein thrombosis        Past Surgical History:   Procedure Laterality Date    HERNIA REPAIR         Social History     Social History    Marital status:      Spouse name: N/A    Number of children: N/A    Years of education: N/A     Social History Main Topics    Smoking status: Never Smoker    Smokeless tobacco: Never Used    Alcohol use Yes    Drug use: No    Sexual activity: Not Asked     Other Topics Concern    None     Social History Narrative    None       Family History   Problem Relation Age of Onset    Alcohol abuse Mother     Asthma Father        Review of Systems  Review of Systems   Musculoskeletal: Positive for myalgias. Negative for falls and joint pain.   Skin:        Negative for bruising     A complete review of systems was otherwise negative.    Physical Exam  /75 (BP Location: Right arm, Patient Position: Sitting)   Pulse 81   Temp 98.9 °F (37.2 °C) (Oral)   Ht 6' (1.829 m)   Wt 108.3 kg (238 lb 12.1 oz)   SpO2 97%   BMI 32.38 kg/m²   General appearance: alert, appears stated age, cooperative and no distress  Extremities: extremities normal, atraumatic, no cyanosis or edema  Pulses: 2+ and symmetric  Skin: Skin " color, texture, turgor normal. No rashes or lesions  Neurologic: Grossly normal  Musculoskeletal: There is tenderness to medial aspect of antecubital region with external rotation. There is no swelling or obvious deformity. Full ROM to left shoulder, elbow, and wrist. Distal sensation and pulses intact.    Assessment/Plan  Strain of muscle, fascia and tendon of other parts of biceps, left arm, initial encounter  Use ace wrap for comfort. Continue to ice and rest area.  Voltaren gel with Tylenol, avoid oral NSAIDS.  Monitor for worsening and RTC PRN.  -     diclofenac sodium (VOLTAREN) 1 % Gel; Apply 2 g topically 4 (four) times daily.  Dispense: 100 g; Refill: 0    Acute deep vein thrombosis (DVT) of tibial vein of right lower extremity  The current medical regimen is effective;  continue present plan and medications.    He has verbalized understanding and is in agreement with plan of care.  Return if symptoms worsen or fail to improve.

## 2017-09-15 NOTE — PATIENT INSTRUCTIONS
Muscle Strain in the Extremities  A muscle strain is a stretching and tearing of muscle fibers. This causes pain, especially when you move that muscle. There may also be some swelling and bruising.  Home care  · Keep the hurt area raised to reduce pain and swelling. This is especially important during the first 48 hours.  · Apply an ice pack over the injured area for 15 to 20 minutes every 3 to 6 hours. You should do this for the first 24 to 48 hours. You can make an ice pack by filling a plastic bag that seals at the top with ice cubes and then wrapping it with a thin towel. Be careful not to injure your skin with the ice treatments. Ice should never be applied directly to skin. Continue the use of ice packs for relief of pain and swelling as needed. After 48 hours, apply heat (warm shower or warm bath) for 15 to 20 minutes several times a day, or alternate ice and heat.  · You may use over-the-counter pain medicine to control pain, unless another medicine was prescribed. If you have chronic liver or kidney disease or ever had a stomach ulcer or GI bleeding, talk with your healthcare provider before using these medicines.  · For leg strains: If crutches have been recommended, dont put full weight on the hurt leg until you can do so without pain. You can return to sports when you are able to hop and run on the injured leg without pain.  Follow-up care  Follow up with your healthcare provider, or as advised.  When to seek medical advice  Call your healthcare provider right away if any of these occur:  · The toes of the injured leg become swollen, cold, blue, numb, or tingly  · Pain or swelling increases  Date Last Reviewed: 11/19/2015  © 4935-3653 [employer]. [address] [place] [address], [place] [address]. All rights reserved. This information is not intended as a substitute for professional medical care. Always follow your healthcare professional's instructions.

## 2017-10-03 ENCOUNTER — PATIENT MESSAGE (OUTPATIENT)
Dept: FAMILY MEDICINE | Facility: CLINIC | Age: 62
End: 2017-10-03

## 2017-10-03 DIAGNOSIS — I82.441 ACUTE DEEP VEIN THROMBOSIS (DVT) OF TIBIAL VEIN OF RIGHT LOWER EXTREMITY: Primary | ICD-10-CM

## 2017-10-04 ENCOUNTER — PATIENT MESSAGE (OUTPATIENT)
Dept: FAMILY MEDICINE | Facility: CLINIC | Age: 62
End: 2017-10-04

## 2017-10-06 DIAGNOSIS — Z12.11 COLON CANCER SCREENING: ICD-10-CM

## 2017-10-13 ENCOUNTER — OFFICE VISIT (OUTPATIENT)
Dept: FAMILY MEDICINE | Facility: CLINIC | Age: 62
End: 2017-10-13
Payer: COMMERCIAL

## 2017-10-13 ENCOUNTER — PATIENT MESSAGE (OUTPATIENT)
Dept: FAMILY MEDICINE | Facility: CLINIC | Age: 62
End: 2017-10-13

## 2017-10-13 VITALS
BODY MASS INDEX: 31.4 KG/M2 | HEART RATE: 96 BPM | OXYGEN SATURATION: 96 % | TEMPERATURE: 99 F | WEIGHT: 231.81 LBS | HEIGHT: 72 IN | SYSTOLIC BLOOD PRESSURE: 125 MMHG | DIASTOLIC BLOOD PRESSURE: 80 MMHG

## 2017-10-13 DIAGNOSIS — K12.2 UVULITIS: ICD-10-CM

## 2017-10-13 DIAGNOSIS — J02.0 ACUTE STREPTOCOCCAL PHARYNGITIS: Primary | ICD-10-CM

## 2017-10-13 PROCEDURE — 99999 PR PBB SHADOW E&M-EST. PATIENT-LVL IV: CPT | Mod: PBBFAC,,, | Performed by: NURSE PRACTITIONER

## 2017-10-13 PROCEDURE — 96372 THER/PROPH/DIAG INJ SC/IM: CPT | Mod: S$GLB,,, | Performed by: NURSE PRACTITIONER

## 2017-10-13 PROCEDURE — 99214 OFFICE O/P EST MOD 30 MIN: CPT | Mod: 25,S$GLB,, | Performed by: NURSE PRACTITIONER

## 2017-10-13 PROCEDURE — 87081 CULTURE SCREEN ONLY: CPT

## 2017-10-13 RX ORDER — METHYLPREDNISOLONE 4 MG/1
TABLET ORAL
Qty: 1 PACKAGE | Refills: 0 | Status: SHIPPED | OUTPATIENT
Start: 2017-10-13 | End: 2017-10-30

## 2017-10-13 RX ORDER — CEPHALEXIN 500 MG/1
500 CAPSULE ORAL EVERY 8 HOURS
Qty: 21 CAPSULE | Refills: 0 | Status: SHIPPED | OUTPATIENT
Start: 2017-10-13 | End: 2017-10-20

## 2017-10-13 RX ORDER — CEFTRIAXONE 500 MG/1
500 INJECTION, POWDER, FOR SOLUTION INTRAMUSCULAR; INTRAVENOUS
Status: COMPLETED | OUTPATIENT
Start: 2017-10-13 | End: 2017-10-13

## 2017-10-13 RX ADMIN — CEFTRIAXONE 500 MG: 500 INJECTION, POWDER, FOR SOLUTION INTRAMUSCULAR; INTRAVENOUS at 11:10

## 2017-10-13 NOTE — PATIENT INSTRUCTIONS
Uvulitis    The uvula is the tissue that hangs in the back of the throat. Uvulitis is inflammation of the uvula. Inflammation happens when the body responds to an injury, allergic reaction, infection or illness. Symptoms of inflammation may include redness, irritation, itching, swelling, or burning. Uvulitis is more common in children than adults.  Symptoms of uvulitis include:  · Sore throat  · Trouble swallowing  · Painful swallowing  · Trouble breathing  Possible causes of uvulitis include:  · Throat infection  · Inhaling or swallowing chemicals   · Inhaling hot air or steam  · Allergic reaction to something eaten, touched or breathed in  In rare cases, uvulitis can be caused by a condition called angioedema. Angioendema can be:  · Hereditary (runs in the family).   · A side effect of a class of drugs used for high blood pressure called ACE inhibitors.  · Life-threatening. It can lead to swelling of the air passage in the mouth or throat. Severe swelling can block your breathing and cause death. Watch for the earliest signs of this illness. Call 911 if the swelling involves the face, mouth, or throat areas.  To help find the cause of the uvulitis, imaging tests may be done. If infection is suspected, swabs from the throat or samples of blood may be tested. Questions may be asked about an individuals vaccination history to be sure it is up to date. A cause for uvulitis is not always found.  Treatment depends on the cause and the severity of the symptoms.  Home care  Medicines: The doctor may prescribe antibiotics for an infection. For an allergic reaction or angioedema, medicines called steroids or antihistamines may be given. Follow instructions when using any medicine.  To care for the condition at home:  · Rest until the symptoms go away.  · If medicines were prescribed, be sure they are taken as directed. They should be taken until they are gone or the healthcare provider says to stop them.  · If you were  told that your angioedema was from a medicine that you are taking, you must stop taking this medicine. Contact your doctor for a prescription for a different medicine. Advise future medical providers that you are allergic to this medicine.  · Contact your healthcare provider before taking any over-the-counter medicines.  · Drink fluids. Pain when swallowing may make it harder to drink and lead to dehydration. To prevent this, sip fluids throughout the day. Children can be given frozen juice bars, milk, or other cold liquids. Watch for the signs of dehydration listed below.  · Ask your healthcare provider when you can return to work or school. Ask your childs healthcare provider when your child can return to school or .  Follow-up care  Follow up with the healthcare provider as directed. You may be referred to a specialist (an allergy doctor and/or an ear, nose, or throat doctor) for further evaluation and treatment. Make these visits as soon as possible.  When to seek medical advice  Call the healthcare provider right away for any of the following:  · Symptoms not going away or getting worse  · Symptoms of dehydration, including dark urine, dry mouth, cracked lips, dizziness, or sunken eyes  · A child acting very sick or not improving  Fever in adults:   · Fever over 100.4°F (38°C), or as directed by the healthcare provider.  Fever in children:  · Child of any age has repeated fevers above 104°F (40°C).  · Child younger than 2 years of age has a fever of 100.4°F (38°C) that continues for more than 1 day.  · Child 2 years old or older has a fever of 100.4°F (38°C) that continues for more than 3 days.  Call 911  Call 911 if any of these occur:  · Drooling or trouble swallowing  · Trouble breathing  · Trouble talking  · Swollen tongue, lips, face, or throat (may be indicated by voice changes)  · Jerking and loss of consciousness; seizure  · Lack of response, extreme drowsiness, or trouble waking  up  · Fainting  · Confusion  · Child being unresponsive  · Skin or lips look blue, purple, or gray  Date Last Reviewed: 11/20/2015  © 1940-6761 [employer]. [address] [place] [address], [place] [address]. All rights reserved. This information is not intended as a substitute for professional medical care. Always follow your healthcare professional's instructions.

## 2017-10-13 NOTE — PROGRESS NOTES
History of Present Illness   Ron Christina is a 62 y.o. man with medical history as listed below who presents today for evaluation of sore throat with swollen uvula. Symptoms have been present for two days. He states that pain is a burning pain, and he can feel himself swallowing his uvula. He has had this in the past and required treatment with steroids and antibiotics. He has seen surgeon to discuss removal but decided against the procedure. He has had no fevers or chills. He has no additional complaints and is otherwise healthy on today's visit.      Past Medical History:   Diagnosis Date    Allergy     Deep vein thrombosis        Past Surgical History:   Procedure Laterality Date    HERNIA REPAIR         Social History     Social History    Marital status:      Spouse name: N/A    Number of children: N/A    Years of education: N/A     Social History Main Topics    Smoking status: Never Smoker    Smokeless tobacco: Never Used    Alcohol use Yes    Drug use: No    Sexual activity: Not Asked     Other Topics Concern    None     Social History Narrative    None       Family History   Problem Relation Age of Onset    Alcohol abuse Mother     Asthma Father        Review of Systems  Review of Systems   Constitutional: Negative for chills and fever.   HENT: Positive for sore throat. Negative for congestion and ear pain.    Respiratory: Negative for shortness of breath and stridor.      A complete review of systems was otherwise negative.    Physical Exam  /80 (BP Location: Right arm, Patient Position: Sitting)   Pulse 96   Temp 98.9 °F (37.2 °C) (Oral)   Ht 6' (1.829 m)   Wt 105.2 kg (231 lb 13 oz)   SpO2 96%   BMI 31.44 kg/m²   General appearance: alert, appears stated age, cooperative and no distress  Throat: lips, mucosa, and tongue normal; teeth and gums normal and moderate oropharyngeal erythema and edema with significant uvula swelling  Lungs: clear to auscultation  bilaterally  Heart: regular rate and rhythm, S1, S2 normal, no murmur, click, rub or gallop  Lymph nodes: Anterior cervical lymphadenopathy, supraclavicular, and axillary nodes normal.  Neurologic: Grossly normal    Assessment/Plan  Acute streptococcal pharyngitis  Throat culture obtained.  IM dose of Rocephin and Solumedrol administered.  Start medrol dose pack and Keflex tomorrow.  ER precautions discussed.  Handout provided on home management.  RTC PRN for worsening or persistent symptoms.  -     Strep A culture, throat  -     cefTRIAXone injection 500 mg; Inject 0.5 g (500 mg total) into the muscle one time.  -     cephALEXin (KEFLEX) 500 MG capsule; Take 1 capsule (500 mg total) by mouth every 8 (eight) hours.  Dispense: 21 capsule; Refill: 0    Uvulitis  As above.  Monitor for difficulty breathing, swallowing especially with garbled voice or drooling. Go to ER immediately should this occur.  -     methylPREDNISolone sod suc(PF) injection 125 mg; Inject 125 mg into the vein one time.  -     methylPREDNISolone (MEDROL DOSEPACK) 4 mg tablet; use as directed  Dispense: 1 Package; Refill: 0    He has verbalized understanding and is in agreement with plan of care.    Return if symptoms worsen or fail to improve.

## 2017-10-15 LAB — BACTERIA THROAT CULT: NORMAL

## 2017-10-16 ENCOUNTER — PATIENT MESSAGE (OUTPATIENT)
Dept: FAMILY MEDICINE | Facility: CLINIC | Age: 62
End: 2017-10-16

## 2017-10-30 ENCOUNTER — INITIAL CONSULT (OUTPATIENT)
Dept: HEMATOLOGY/ONCOLOGY | Facility: CLINIC | Age: 62
End: 2017-10-30
Payer: COMMERCIAL

## 2017-10-30 VITALS
HEIGHT: 72 IN | BODY MASS INDEX: 32.75 KG/M2 | HEART RATE: 93 BPM | OXYGEN SATURATION: 93 % | DIASTOLIC BLOOD PRESSURE: 85 MMHG | SYSTOLIC BLOOD PRESSURE: 126 MMHG | WEIGHT: 241.75 LBS | TEMPERATURE: 99 F

## 2017-10-30 DIAGNOSIS — I82.441 DEEP VEIN THROMBOSIS (DVT) OF TIBIAL VEIN OF RIGHT LOWER EXTREMITY, UNSPECIFIED CHRONICITY: Primary | ICD-10-CM

## 2017-10-30 PROCEDURE — 99203 OFFICE O/P NEW LOW 30 MIN: CPT | Mod: S$GLB,,, | Performed by: INTERNAL MEDICINE

## 2017-10-30 PROCEDURE — 99999 PR PBB SHADOW E&M-EST. PATIENT-LVL III: CPT | Mod: PBBFAC,,, | Performed by: INTERNAL MEDICINE

## 2017-10-30 NOTE — LETTER
October 31, 2017      Selvin Rae MD  [address]  [place] [address]           [facility]  [address] [facility] [place] [address]  Phone: [phone]          Patient: Ron Christina   MR Number: [id number]   YOB: 1955   Date of Visit: 10/30/2017       Dear Dr. Selvin Rae:    Thank you for referring Ron Christina to me for evaluation. Attached you will find relevant portions of my assessment and plan of care.    If you have questions, please do not hesitate to call me. I look forward to following Ron Christina along with you.    Sincerely,    Reina Castañeda MD    Enclosure  CC:  No Recipients    If you would like to receive this communication electronically, please contact [email or web address] or [phone] to request more information on [employer] access.    For providers and/or their staff who would like to refer a patient to [facility], please contact us through our one-stop-shop provider referral line, Todd Gomez, at [phone].    If you feel you have received this communication in error or would no longer like to receive these types of communications, please e-mail [email or web address]

## 2017-11-01 ENCOUNTER — HOSPITAL ENCOUNTER (OUTPATIENT)
Dept: RADIOLOGY | Facility: HOSPITAL | Age: 62
Discharge: HOME | End: 2017-11-01
Attending: NURSE PRACTITIONER
Payer: COMMERCIAL

## 2017-11-01 ENCOUNTER — OFFICE VISIT (OUTPATIENT)
Dept: FAMILY MEDICINE | Facility: CLINIC | Age: 62
End: 2017-11-01
Payer: COMMERCIAL

## 2017-11-01 VITALS
WEIGHT: 237.56 LBS | HEART RATE: 88 BPM | HEIGHT: 72 IN | DIASTOLIC BLOOD PRESSURE: 80 MMHG | BODY MASS INDEX: 32.18 KG/M2 | TEMPERATURE: 99 F | SYSTOLIC BLOOD PRESSURE: 125 MMHG | OXYGEN SATURATION: 95 %

## 2017-11-01 DIAGNOSIS — B37.0 ORAL THRUSH: ICD-10-CM

## 2017-11-01 DIAGNOSIS — J06.9 VIRAL URI WITH COUGH: Primary | ICD-10-CM

## 2017-11-01 DIAGNOSIS — J06.9 VIRAL URI WITH COUGH: ICD-10-CM

## 2017-11-01 PROCEDURE — 71020 XR CHEST PA AND LATERAL: CPT | Mod: 26,,, | Performed by: RADIOLOGY

## 2017-11-01 PROCEDURE — 99999 PR PBB SHADOW E&M-EST. PATIENT-LVL IV: CPT | Mod: PBBFAC,,, | Performed by: NURSE PRACTITIONER

## 2017-11-01 PROCEDURE — 71020 XR CHEST PA AND LATERAL: CPT | Mod: TC,PO

## 2017-11-01 PROCEDURE — 99214 OFFICE O/P EST MOD 30 MIN: CPT | Mod: S$GLB,,, | Performed by: NURSE PRACTITIONER

## 2017-11-01 RX ORDER — CLOTRIMAZOLE 10 MG/1
10 LOZENGE ORAL; TOPICAL
Qty: 150 TABLET | Refills: 0 | Status: SHIPPED | OUTPATIENT
Start: 2017-11-01 | End: 2017-12-01

## 2017-11-01 RX ORDER — BENZONATATE 100 MG/1
100 CAPSULE ORAL 3 TIMES DAILY PRN
Qty: 30 CAPSULE | Refills: 0 | Status: SHIPPED | OUTPATIENT
Start: 2017-11-01 | End: 2017-11-11

## 2017-11-01 RX ORDER — PROMETHAZINE HYDROCHLORIDE AND DEXTROMETHORPHAN HYDROBROMIDE 6.25; 15 MG/5ML; MG/5ML
5 SYRUP ORAL NIGHTLY
Qty: 180 ML | Refills: 0 | Status: SHIPPED | OUTPATIENT
Start: 2017-11-01 | End: 2017-11-11

## 2017-11-01 NOTE — LETTER
November 1, 2017      Ron Christina   [address] [place] [address]             [place] [address] [facility]  [place] [address]  Phone: [phone]  Fax: [phone] Ron Redmond [place]    Was treated here on 11/01/2017    Please excuse or provide alternate dates give acute illness.  Dx: pneumonia.         Tonya Blackman NP

## 2017-11-01 NOTE — PROGRESS NOTES
History of Present Illness   Ron Christina is a 62 y.o. man with medical history as listed below who presents today for evaluation of URI symptoms. He states he has not been feeling well since his last visit with myself on 10/13. He complains of a persistent cough that is minimally productive with chest congestion. He has also had intermittent low grade fevers. He sometimes hears a nighttime wheeze. He was seen by hematologist yesterday and O2 level was decreased at 93%. She denies shortness of breath or chest pain. He has been taking Tylenol cold and flu with minimal relief. He admits that after last visit he did go to [employer] for six days and never really did rest. He has no additional complaints and is otherwise healthy on today's visit.    Past Medical History:   Diagnosis Date    Allergy     Deep vein thrombosis        Past Surgical History:   Procedure Laterality Date    HERNIA REPAIR         Social History     Social History    Marital status:      Spouse name: N/A    Number of children: N/A    Years of education: N/A     Social History Main Topics    Smoking status: Never Smoker    Smokeless tobacco: Never Used    Alcohol use Yes    Drug use: No    Sexual activity: Not Asked     Other Topics Concern    None     Social History Narrative    None       Family History   Problem Relation Age of Onset    Alcohol abuse Mother     Asthma Father        Review of Systems  Review of Systems   Constitutional: Positive for fever and malaise/fatigue.   HENT: Positive for congestion.    Respiratory: Positive for cough, sputum production and wheezing. Negative for shortness of breath.    Cardiovascular: Negative for chest pain and palpitations.     A complete review of systems was otherwise negative.    Physical Exam  /80 (BP Location: Right arm, Patient Position: Sitting)   Pulse 88   Temp 98.5 °F (36.9 °C) (Oral)   Ht 6' (1.829 m)   Wt 107.7 kg (237 lb 8.7 oz)   SpO2 95%   BMI  32.22 kg/m²   General appearance: alert, appears stated age, cooperative and no distress  Ears: normal TM's and external ear canals both ears  Nose: Nares normal. Septum midline. Mucosa normal. No drainage or sinus tenderness.  Throat: lips, mucosa, and tongue normal; teeth and gums normal and oral thrush present  Lungs: clear to auscultation bilaterally  Heart: regular rate and rhythm, S1, S2 normal, no murmur, click, rub or gallop  Lymph nodes: Cervical, supraclavicular, and axillary nodes normal.  Neurologic: Grossly normal    Assessment/Plan  Viral URI with cough  X-ray to rule out pneumonia.  Likely viral, no further antibiotics or steroids indicated.  Tessalon with nighttime cough syrup as needed.  Tylenol for fever and aches.  Rest and increase fluid intake.  -     X-Ray Chest PA And Lateral; Future; Expected date: 11/01/2017  -     promethazine-dextromethorphan (PROMETHAZINE-DM) 6.25-15 mg/5 mL Syrp; Take 5 mLs by mouth every evening.  Dispense: 180 mL; Refill: 0  -     benzonatate (TESSALON) 100 MG capsule; Take 1 capsule (100 mg total) by mouth 3 (three) times daily as needed.  Dispense: 30 capsule; Refill: 0    Oral thrush  Medication as listed below.  -     clotrimazole (MYCELEX) 10 mg alex; Take 1 tablet (10 mg total) by mouth 5 (five) times daily.  Dispense: 150 tablet; Refill: 0    He has verbalized understanding and is in agreement with plan of care.    Return if symptoms worsen or fail to improve.

## 2017-11-01 NOTE — PATIENT INSTRUCTIONS
Viral Upper Respiratory Illness (Adult)  You have a viral upper respiratory illness (URI), which is another term for the common cold. This illness is contagious during the first few days. It is spread through the air by coughing and sneezing. It may also be spread by direct contact (touching the sick person and then touching your own eyes, nose, or mouth). Frequent handwashing will decrease risk of spread. Most viral illnesses go away within 7 to 10 days with rest and simple home remedies. Sometimes the illness may last for several weeks. Antibiotics will not kill a virus, and they are generally not prescribed for this condition.    Home care  · If symptoms are severe, rest at home for the first 2 to 3 days. When you resume activity, don't let yourself get too tired.  · Avoid being exposed to cigarette smoke (yours or others).  · You may use acetaminophen or ibuprofen to control pain and fever, unless another medicine was prescribed. (Note: If you have chronic liver or kidney disease, have ever had a stomach ulcer or gastrointestinal bleeding, or are taking blood-thinning medicines, talk with your healthcare provider before using these medicines.) Aspirin should never be given to anyone under 18 years of age who is ill with a viral infection or fever. It may cause severe liver or brain damage.  · Your appetite may be poor, so a light diet is fine. Avoid dehydration by drinking 6 to 8 glasses of fluids per day (water, soft drinks, juices, tea, or soup). Extra fluids will help loosen secretions in the nose and lungs.  · Over-the-counter cold medicines will not shorten the length of time youre sick, but they may be helpful for the following symptoms: cough, sore throat, and nasal and sinus congestion. (Note: Do not use decongestants if you have high blood pressure.)  Follow-up care  Follow up with your healthcare provider, or as advised.  When to seek medical advice  Call your healthcare provider right away if any  of these occur:  · Cough with lots of colored sputum (mucus)  · Severe headache; face, neck, or ear pain  · Difficulty swallowing due to throat pain  · Fever of 100.4°F (38°C)  Call 911, or get immediate medical care  Call emergency services right away if any of these occur:  · Chest pain, shortness of breath, wheezing, or difficulty breathing  · Coughing up blood  · Inability to swallow due to throat pain  Date Last Reviewed: 9/13/2015  © 6044-1389 [employer]. [address] [place] [id number]. All rights reserved. This information is not intended as a substitute for professional medical care. Always follow your healthcare professional's instructions.

## 2017-11-06 ENCOUNTER — HOSPITAL ENCOUNTER (OUTPATIENT)
Dept: RADIOLOGY | Facility: HOSPITAL | Age: 62
Discharge: HOME | End: 2017-11-06
Attending: INTERNAL MEDICINE
Payer: COMMERCIAL

## 2017-11-06 DIAGNOSIS — I82.441 DEEP VEIN THROMBOSIS (DVT) OF TIBIAL VEIN OF RIGHT LOWER EXTREMITY, UNSPECIFIED CHRONICITY: ICD-10-CM

## 2017-11-06 PROCEDURE — 93971 EXTREMITY STUDY: CPT | Mod: 26,,, | Performed by: RADIOLOGY

## 2017-11-06 PROCEDURE — 93971 EXTREMITY STUDY: CPT | Mod: TC

## 2017-12-12 ENCOUNTER — OFFICE VISIT (OUTPATIENT)
Dept: FAMILY MEDICINE | Facility: CLINIC | Age: 62
End: 2017-12-12
Payer: COMMERCIAL

## 2017-12-12 VITALS
HEART RATE: 76 BPM | HEIGHT: 72 IN | BODY MASS INDEX: 33.84 KG/M2 | DIASTOLIC BLOOD PRESSURE: 90 MMHG | OXYGEN SATURATION: 96 % | TEMPERATURE: 98 F | WEIGHT: 249.88 LBS | SYSTOLIC BLOOD PRESSURE: 150 MMHG

## 2017-12-12 DIAGNOSIS — R03.0 ELEVATED BLOOD PRESSURE READING WITHOUT DIAGNOSIS OF HYPERTENSION: ICD-10-CM

## 2017-12-12 DIAGNOSIS — S46.812A TRAPEZIUS STRAIN, LEFT, INITIAL ENCOUNTER: Primary | ICD-10-CM

## 2017-12-12 DIAGNOSIS — S46.192A OTHER INJURY OF MUSCLE, FASCIA AND TENDON OF LONG HEAD OF BICEPS, LEFT ARM, INITIAL ENCOUNTER: ICD-10-CM

## 2017-12-12 PROBLEM — I82.441 ACUTE DEEP VEIN THROMBOSIS (DVT) OF TIBIAL VEIN OF RIGHT LOWER EXTREMITY: Status: RESOLVED | Noted: 2017-06-20 | Resolved: 2017-12-12

## 2017-12-12 PROCEDURE — 99214 OFFICE O/P EST MOD 30 MIN: CPT | Mod: S$GLB,,, | Performed by: INTERNAL MEDICINE

## 2017-12-12 PROCEDURE — 99999 PR PBB SHADOW E&M-EST. PATIENT-LVL III: CPT | Mod: PBBFAC,,, | Performed by: INTERNAL MEDICINE

## 2017-12-12 RX ORDER — METHOCARBAMOL 750 MG/1
TABLET, FILM COATED ORAL
Qty: 60 TABLET | Refills: 0 | Status: SHIPPED | OUTPATIENT
Start: 2017-12-12 | End: 2018-01-16

## 2017-12-12 NOTE — PROGRESS NOTES
Assessment & Plan  Problem List Items Addressed This Visit        Cardiac/Vascular    Elevated blood pressure reading without diagnosis of hypertension    Overview     Good home BP log         Current Assessment & Plan     Monitor            Other Visit Diagnoses     Trapezius strain, left, initial encounter    -  Primary  -    Ok to continue PO NSAIDs and Salonpas.  Add methocarbamol at night.     Relevant Medications    methocarbamol (ROBAXIN) 750 MG Tab    Other injury of muscle, fascia and tendon of long head of biceps, left arm, initial encounter    -  Referred to Ortho.  Concern for biceps tendon tear.      Relevant Orders    Ambulatory referral to Orthopedics            Health Maintenance reviewed, deferred.    Follow-up: Return in about 6 months (around 6/12/2018) for Routine Physical.    ______________________________________________________________________    Chief Complaint  Chief Complaint   Patient presents with    Neck Pain       HPI  Ron Christina is a 62 y.o. male with multiple medical diagnoses as listed in the medical history and problem list that presents for neck pain for about a month.  Pt is known to me with his last appointment 11/1/2017.      Reports that symptoms are worse at night.  He has been doing some increased activity with pulling some paneling off the wall.  He has been taking ibuprofen and using Salonpas with good results.  No radicular symptoms. Pain radiates to the shoulder blade.  No UE weakness.        PAST MEDICAL HISTORY:  Past Medical History:   Diagnosis Date    Allergy     Deep vein thrombosis        PAST SURGICAL HISTORY:  Past Surgical History:   Procedure Laterality Date    HERNIA REPAIR         SOCIAL HISTORY:  Social History     Social History    Marital status:      Spouse name: N/A    Number of children: N/A    Years of education: N/A     Occupational History    Not on file.     Social History Main Topics    Smoking status: Never Smoker     Smokeless tobacco: Never Used    Alcohol use Yes    Drug use: No    Sexual activity: Not on file     Other Topics Concern    Not on file     Social History Narrative    No narrative on file       FAMILY HISTORY:  Family History   Problem Relation Age of Onset    Alcohol abuse Mother     Asthma Father        ALLERGIES AND MEDICATIONS: updated and reviewed.  Review of patient's allergies indicates:   Allergen Reactions    Penicillins Other (See Comments)     Current Outpatient Prescriptions   Medication Sig Dispense Refill    diclofenac sodium (VOLTAREN) 1 % Gel Apply 2 g topically 4 (four) times daily. 100 g 0    methocarbamol (ROBAXIN) 750 MG Tab 1-2 tabs PO QHS PRN neck pain 60 tablet 0     No current facility-administered medications for this visit.          ROS  Review of Systems   Constitutional: Negative for activity change and unexpected weight change.   HENT: Negative for hearing loss, rhinorrhea and trouble swallowing.    Eyes: Negative for discharge and visual disturbance.   Respiratory: Negative for chest tightness and wheezing.    Cardiovascular: Negative for chest pain and palpitations.   Gastrointestinal: Negative for blood in stool, constipation, diarrhea and vomiting.   Endocrine: Negative for polydipsia and polyuria.   Genitourinary: Negative for difficulty urinating, hematuria and urgency.   Musculoskeletal: Positive for arthralgias and neck pain. Negative for joint swelling.   Neurological: Negative for weakness and headaches.   Psychiatric/Behavioral: Negative for confusion and dysphoric mood.           Physical Exam  Vitals:    12/12/17 1459   BP: (!) 150/90   Pulse: 76   Temp: 98.3 °F (36.8 °C)   SpO2: 96%   Weight: 113.3 kg (249 lb 14.3 oz)   Height: 6' (1.829 m)    Body mass index is 33.89 kg/m².  Weight: 113.3 kg (249 lb 14.3 oz)   Height: 6' (182.9 cm)   Physical Exam   Constitutional: He is oriented to person, place, and time. He appears well-developed and well-nourished. No  distress.   HENT:   Head: Normocephalic and atraumatic.   Eyes: Conjunctivae, EOM and lids are normal. Pupils are equal, round, and reactive to light. No scleral icterus.   Neck: Full passive range of motion without pain. Neck supple. No JVD present. Carotid bruit is not present. No thyromegaly present.   Cardiovascular: Normal rate, regular rhythm, normal heart sounds and intact distal pulses.  Exam reveals no S3, no S4 and no friction rub.    No murmur heard.  Pulmonary/Chest: Effort normal and breath sounds normal. He has no wheezes. He has no rhonchi. He has no rales.   Abdominal: Soft. Bowel sounds are normal. There is no tenderness.   Musculoskeletal: He exhibits no edema or tenderness.        Left elbow: He exhibits deformity (yady deformity).        Arms:  Lymphadenopathy:        Head (right side): No submental and no submandibular adenopathy present.        Head (left side): No submental and no submandibular adenopathy present.     He has no cervical adenopathy.        Right: No supraclavicular adenopathy present.        Left: No supraclavicular adenopathy present.   Neurological: He is alert and oriented to person, place, and time.   Motor grossly intact.  Sensory grossly intact.  Symmetric facial movements palate elevated symmetrically tongue midline   Skin: Skin is warm and dry. No rash noted.   Psychiatric: He has a normal mood and affect. His speech is normal and behavior is normal. Thought content normal.         Health Maintenance       Date Due Completion Date    TETANUS VACCINE 07/28/1972 ---    Colonoscopy 07/28/2004 ---    Influenza Vaccine 08/01/2017 11/16/2016 (Declined)    Override on 11/16/2016: Declined    Override on 9/16/2015: Declined    Lipid Panel 10/21/2021 10/21/2016

## 2018-01-08 DIAGNOSIS — M79.602 LEFT ARM PAIN: Primary | ICD-10-CM

## 2018-01-15 ENCOUNTER — TELEPHONE (OUTPATIENT)
Dept: ORTHOPEDICS | Facility: CLINIC | Age: 63
End: 2018-01-15

## 2018-01-15 NOTE — TELEPHONE ENCOUNTER
Ron Christina reminded of appointment on  1/16/18  with Dr. POLY Regalado w/time and location. Notified of need for xray before OV w/date, time, and location of appts.

## 2018-01-16 ENCOUNTER — HOSPITAL ENCOUNTER (OUTPATIENT)
Dept: RADIOLOGY | Facility: OTHER | Age: 63
Discharge: HOME | End: 2018-01-16
Attending: ORTHOPAEDIC SURGERY
Payer: COMMERCIAL

## 2018-01-16 ENCOUNTER — OFFICE VISIT (OUTPATIENT)
Dept: ORTHOPEDICS | Facility: CLINIC | Age: 63
End: 2018-01-16
Payer: COMMERCIAL

## 2018-01-16 VITALS
DIASTOLIC BLOOD PRESSURE: 95 MMHG | BODY MASS INDEX: 33.83 KG/M2 | SYSTOLIC BLOOD PRESSURE: 146 MMHG | HEART RATE: 81 BPM | WEIGHT: 249.81 LBS | HEIGHT: 72 IN | RESPIRATION RATE: 18 BRPM

## 2018-01-16 DIAGNOSIS — M79.602 LEFT ARM PAIN: ICD-10-CM

## 2018-01-16 DIAGNOSIS — M79.602 LEFT ARM PAIN: Primary | ICD-10-CM

## 2018-01-16 PROCEDURE — 73030 X-RAY EXAM OF SHOULDER: CPT | Mod: TC,FY,LT

## 2018-01-16 PROCEDURE — 99203 OFFICE O/P NEW LOW 30 MIN: CPT | Mod: S$GLB,,, | Performed by: ORTHOPAEDIC SURGERY

## 2018-01-16 PROCEDURE — 73060 X-RAY EXAM OF HUMERUS: CPT | Mod: 26,LT,, | Performed by: RADIOLOGY

## 2018-01-16 PROCEDURE — 73030 X-RAY EXAM OF SHOULDER: CPT | Mod: 26,LT,, | Performed by: RADIOLOGY

## 2018-01-16 PROCEDURE — 73060 X-RAY EXAM OF HUMERUS: CPT | Mod: TC,FY,LT

## 2018-01-16 PROCEDURE — 99999 PR PBB SHADOW E&M-EST. PATIENT-LVL III: CPT | Mod: PBBFAC,,, | Performed by: ORTHOPAEDIC SURGERY

## 2018-01-16 NOTE — LETTER
January 22, 2018      Selvin Rae MD  [address]  [place] [address]           [facility]  [address], [address]  [facility] [address]  Phone: [phone]          Patient: Ron Christina   MR Number: [id number]   YOB: 1955   Date of Visit: 1/16/2018       Dear Dr. Selvin Rae:    Thank you for referring Ron Christina to me for evaluation. Attached you will find relevant portions of my assessment and plan of care.    If you have questions, please do not hesitate to call me. I look forward to following Ron Christina along with you.    Sincerely,    Annie Regalado MD    Enclosure  CC:  No Recipients    If you would like to receive this communication electronically, please contact [email or web address] or [phone] to request more information on [employer] access.    For providers and/or their staff who would like to refer a patient to [facility], please contact us through our one-stop-shop provider referral line, [facility], at [phone].    If you feel you have received this communication in error or would no longer like to receive these types of communications, please e-mail [email or web address]

## 2018-01-16 NOTE — PROGRESS NOTES
H&P  Orthopaedics    SUBJECTIVE:     History of Present Illness:  Patient is a 62 y.o. male with left elbow/forearm tightness.  Pt reports in September while playing volleyball he hyperextended his elbow and felt pain in his proximal forearm at biceps insertion.  Pain resolved over time and now pt just reports some mild tightness in his forearm.  He is mostly here today to determine if he can return to activities or if he will damage his arm.  Owns a [employer] company, [employer].        Review of patient's allergies indicates:   Allergen Reactions    Penicillins Other (See Comments)       Past Medical History:   Diagnosis Date    Allergy     Deep vein thrombosis      Past Surgical History:   Procedure Laterality Date    HERNIA REPAIR       Family History   Problem Relation Age of Onset    Alcohol abuse Mother     Asthma Father      Social History   Substance Use Topics    Smoking status: Never Smoker    Smokeless tobacco: Never Used    Alcohol use Yes        Review of Systems:  Patient denies constitutional symptoms, cardiac symptoms, respiratory symptoms, GI symptoms.  The remainder of the musculoskeletal ROS is included in the HPI.      OBJECTIVE:     Vital Signs (Most Recent)  Pulse: 81 (01/16/18 1016)  Resp: 18 (01/16/18 1016)  BP: (!) 146/95 (01/16/18 1016)    Physical Exam:  Gen:  No acute distress  CV:  Peripherally well-perfused.  Pulses 2+ bilaterally.  Lungs:  Normal respiratory effort.  Abdomen:  Soft, non-tender, non-distended  Head/Neck:  Normocephalic.  Atraumatic. No TTP, AROM and PROM intact without pain  Neuro:  CN intact without deficit, SILT throughout B/L Upper & Lower Extremities    MSK:  LUE:  - mildly asymmetric biceps muscle belly, strength 5/5, no pain  - AROM and PROM the intact  - AIN/PIN/Radial/Median/Ulnar Nerves assessed in isolation without deficit  - SILT throughout  - Radial & Ulnar arteries palpated 2+  - Capillary Refill <3s      Laboratory:  No results found for this or any  previous visit (from the past 72 hour(s)).    Diagnostic Results:  X-Ray: Reviewed no fracture or dislocation    ASSESSMENT/PLAN:     A/P: Ron Christina is a 62 y.o. with likely healed distal biceps partial tear.           Plan:  - will begin PT  -f/u in 8 weeks  -gradual return to activities

## 2018-01-22 NOTE — PROGRESS NOTES
I have personally taken the history and examined this patient. I agree with the resident's note as stated above. Pt is doing well. Only a little tightness with ROm, no strength issues able to do everything he wants.

## 2018-01-24 ENCOUNTER — OFFICE VISIT (OUTPATIENT)
Dept: FAMILY MEDICINE | Facility: CLINIC | Age: 63
End: 2018-01-24
Payer: COMMERCIAL

## 2018-01-24 VITALS
TEMPERATURE: 99 F | WEIGHT: 247.69 LBS | HEIGHT: 72 IN | SYSTOLIC BLOOD PRESSURE: 134 MMHG | DIASTOLIC BLOOD PRESSURE: 88 MMHG | OXYGEN SATURATION: 95 % | HEART RATE: 86 BPM | BODY MASS INDEX: 33.55 KG/M2

## 2018-01-24 DIAGNOSIS — R03.0 ELEVATED BLOOD PRESSURE READING WITHOUT DIAGNOSIS OF HYPERTENSION: ICD-10-CM

## 2018-01-24 DIAGNOSIS — R07.9 CHEST PAIN, UNSPECIFIED TYPE: Primary | ICD-10-CM

## 2018-01-24 DIAGNOSIS — S46.812D STRAIN OF LEFT TRAPEZIUS MUSCLE, SUBSEQUENT ENCOUNTER: ICD-10-CM

## 2018-01-24 PROCEDURE — 99214 OFFICE O/P EST MOD 30 MIN: CPT | Mod: S$GLB,,, | Performed by: INTERNAL MEDICINE

## 2018-01-24 PROCEDURE — 99999 PR PBB SHADOW E&M-EST. PATIENT-LVL III: CPT | Mod: PBBFAC,,, | Performed by: INTERNAL MEDICINE

## 2018-01-24 PROCEDURE — 93005 ELECTROCARDIOGRAM TRACING: CPT | Mod: S$GLB,,, | Performed by: INTERNAL MEDICINE

## 2018-01-24 PROCEDURE — 93010 ELECTROCARDIOGRAM REPORT: CPT | Mod: S$GLB,,, | Performed by: INTERNAL MEDICINE

## 2018-01-24 NOTE — PROGRESS NOTES
Assessment & Plan  Problem List Items Addressed This Visit        Cardiac/Vascular    Elevated blood pressure reading without diagnosis of hypertension    Overview     Good home BP log         Current Assessment & Plan     Monitor           Other Visit Diagnoses     Chest pain, unspecified type    -  Primary  -    Likely chest wall pain.  EKG per my read shows NSR without AV block, prolonged QTc, Q wave, TWI or ST changes.       Relevant Orders    EKG 12-lead    Strain of left trapezius muscle, subsequent encounter    -  Improving. Okk to see chiropractor             Health Maintenance reviewed, deferred.    Follow-up: Follow-up if symptoms worsen or fail to improve.    ______________________________________________________________________    Chief Complaint  Chief Complaint   Patient presents with    Neck Pain       HPI  Ron Christina is a 62 y.o. male with multiple medical diagnoses as listed in the medical history and problem list that presents for neck pain follow up.  Pt is known to me with his last appointment 12/12/2017.  We had treated him with NSAIDs and methocarbamol at night at the [facility].  He was also seen by Ortho regarding his biceps injury with a reassuring evaluation.     The pain is more prominent with the left side and is radiating to the shoulder blade.  It is particularly with turning his head to check the blind spot while driving.  His neck pain is improving overall.     Also with some left chest pain.  He can walk the treadmill without any symptoms.  He will rub the left side of the chest and it will improve the pain.  The pain is nearly constant and not associated with SOB, palpitations, dizzy/lightheadeness or vision change.        PAST MEDICAL HISTORY:  Past Medical History:   Diagnosis Date    Allergy     Deep vein thrombosis        PAST SURGICAL HISTORY:  Past Surgical History:   Procedure Laterality Date    HERNIA REPAIR         SOCIAL HISTORY:  Social History     Social History     Marital status:      Spouse name: N/A    Number of children: N/A    Years of education: N/A     Occupational History    Not on file.     Social History Main Topics    Smoking status: Never Smoker    Smokeless tobacco: Never Used    Alcohol use Yes    Drug use: No    Sexual activity: Not on file     Other Topics Concern    Not on file     Social History Narrative    No narrative on file       FAMILY HISTORY:  Family History   Problem Relation Age of Onset    Alcohol abuse Mother     Asthma Father        ALLERGIES AND MEDICATIONS: updated and reviewed.  Review of patient's allergies indicates:   Allergen Reactions    Penicillins Other (See Comments)     No current outpatient prescriptions on file.     No current facility-administered medications for this visit.          ROS  Review of Systems   Constitutional: Negative for chills, fever and unexpected weight change.   HENT: Negative for congestion, dental problem, ear pain, hearing loss, rhinorrhea, sore throat and trouble swallowing.    Eyes: Negative for discharge, redness and visual disturbance.   Respiratory: Negative for cough, chest tightness, shortness of breath and wheezing.    Cardiovascular: Positive for chest pain. Negative for palpitations and leg swelling.   Gastrointestinal: Negative for abdominal pain, constipation, diarrhea, nausea and vomiting.   Endocrine: Negative for polydipsia, polyphagia and polyuria.   Genitourinary: Negative for decreased urine volume, dysuria and hematuria.   Musculoskeletal: Positive for neck pain and neck stiffness. Negative for arthralgias and myalgias.   Skin: Negative for color change and rash.   Neurological: Negative for dizziness, weakness, light-headedness and headaches.   Psychiatric/Behavioral: Negative for decreased concentration, dysphoric mood, sleep disturbance and suicidal ideas.           Physical Exam  Vitals:    01/24/18 1037 01/24/18 1108   BP: (!) 140/100 134/88   Pulse: 86    Temp:  98.6 °F (37 °C)    SpO2: 95%    Weight: 112.4 kg (247 lb 11 oz)    Height: 6' (1.829 m)     Body mass index is 33.59 kg/m².  Weight: 112.4 kg (247 lb 11 oz)   Height: 6' (182.9 cm)   Physical Exam   Constitutional: He is oriented to person, place, and time. He appears well-developed and well-nourished. No distress.   HENT:   Head: Normocephalic and atraumatic.   Eyes: Conjunctivae, EOM and lids are normal. Pupils are equal, round, and reactive to light. No scleral icterus.   Neck: Full passive range of motion without pain. Neck supple. No JVD present. Carotid bruit is not present. No thyromegaly present.   Cardiovascular: Normal rate, regular rhythm, normal heart sounds and intact distal pulses.  Exam reveals no S3, no S4 and no friction rub.    No murmur heard.  Pulmonary/Chest: Effort normal and breath sounds normal. He has no wheezes. He has no rhonchi. He has no rales.   Abdominal: Soft. Bowel sounds are normal. There is no tenderness.   Musculoskeletal: He exhibits no edema or tenderness.   Lymphadenopathy:        Head (right side): No submental and no submandibular adenopathy present.        Head (left side): No submental and no submandibular adenopathy present.     He has no cervical adenopathy.        Right: No supraclavicular adenopathy present.        Left: No supraclavicular adenopathy present.   Neurological: He is alert and oriented to person, place, and time.   Motor grossly intact.  Sensory grossly intact.  Symmetric facial movements palate elevated symmetrically tongue midline   Skin: Skin is warm and dry. No rash noted.   Psychiatric: He has a normal mood and affect. His speech is normal and behavior is normal. Thought content normal.         Health Maintenance       Date Due Completion Date    TETANUS VACCINE 07/28/1972 ---    Colonoscopy 07/28/2004 ---    Influenza Vaccine 08/01/2017 11/16/2016 (Declined)    Override on 11/16/2016: Declined    Override on 9/16/2015: Declined    Lipid Panel  10/21/2021 10/21/2016

## 2018-10-12 DIAGNOSIS — Z12.11 COLON CANCER SCREENING: ICD-10-CM

## 2018-12-17 ENCOUNTER — LAB VISIT (OUTPATIENT)
Dept: LAB | Facility: HOSPITAL | Age: 63
End: 2018-12-17
Attending: INTERNAL MEDICINE
Payer: COMMERCIAL

## 2018-12-17 DIAGNOSIS — Z12.11 COLON CANCER SCREENING: ICD-10-CM

## 2018-12-17 PROCEDURE — 82274 ASSAY TEST FOR BLOOD FECAL: CPT

## 2018-12-18 LAB — HEMOCCULT STL QL IA: NEGATIVE

## 2018-12-18 NOTE — PROGRESS NOTES
Your stool results are normal.  Please continue your current medications and doses.  Please feel free to contact me with any questions or concerns.    Sincerely,  Selvin Rae  [email or web address]

## 2019-07-18 ENCOUNTER — OFFICE VISIT (OUTPATIENT)
Dept: FAMILY MEDICINE | Facility: CLINIC | Age: 64
End: 2019-07-18
Payer: COMMERCIAL

## 2019-07-18 VITALS
BODY MASS INDEX: 32.41 KG/M2 | SYSTOLIC BLOOD PRESSURE: 130 MMHG | HEIGHT: 72 IN | HEART RATE: 87 BPM | TEMPERATURE: 98 F | OXYGEN SATURATION: 98 % | DIASTOLIC BLOOD PRESSURE: 88 MMHG | WEIGHT: 239.31 LBS

## 2019-07-18 DIAGNOSIS — R13.19 OTHER DYSPHAGIA: Primary | ICD-10-CM

## 2019-07-18 DIAGNOSIS — Z12.5 SCREENING FOR PROSTATE CANCER: ICD-10-CM

## 2019-07-18 DIAGNOSIS — Z00.00 ROUTINE MEDICAL EXAM: ICD-10-CM

## 2019-07-18 PROCEDURE — 99999 PR PBB SHADOW E&M-EST. PATIENT-LVL III: CPT | Mod: PBBFAC,,, | Performed by: INTERNAL MEDICINE

## 2019-07-18 PROCEDURE — 99214 OFFICE O/P EST MOD 30 MIN: CPT | Mod: S$GLB,,, | Performed by: INTERNAL MEDICINE

## 2019-07-18 PROCEDURE — 3008F PR BODY MASS INDEX (BMI) DOCUMENTED: ICD-10-PCS | Mod: CPTII,S$GLB,, | Performed by: INTERNAL MEDICINE

## 2019-07-18 PROCEDURE — 99214 PR OFFICE/OUTPT VISIT, EST, LEVL IV, 30-39 MIN: ICD-10-PCS | Mod: S$GLB,,, | Performed by: INTERNAL MEDICINE

## 2019-07-18 PROCEDURE — 99999 PR PBB SHADOW E&M-EST. PATIENT-LVL III: ICD-10-PCS | Mod: PBBFAC,,, | Performed by: INTERNAL MEDICINE

## 2019-07-18 PROCEDURE — 3008F BODY MASS INDEX DOCD: CPT | Mod: CPTII,S$GLB,, | Performed by: INTERNAL MEDICINE

## 2019-07-18 RX ORDER — OMEPRAZOLE 40 MG/1
40 CAPSULE, DELAYED RELEASE ORAL EVERY MORNING
Qty: 90 CAPSULE | Refills: 0 | Status: SHIPPED | OUTPATIENT
Start: 2019-07-18 | End: 2019-09-10

## 2019-07-18 NOTE — PROGRESS NOTES
"Assessment & Plan  Problem List Items Addressed This Visit     None      Visit Diagnoses     Other dysphagia    -  Primary  -    Unclear etiology.  Has hypertrophic uvula.  Start PPI.  If no improvement will refer to ENT for laryngoscopy.     Relevant Medications    omeprazole (PRILOSEC) 40 MG capsule    Routine medical exam    -  Not addressed today.  Future labs only    Relevant Orders    CBC auto differential    Comprehensive metabolic panel    Lipid panel    PSA, Screening    Screening for prostate cancer  -  Check labs      Relevant Orders    PSA, Screening            Health Maintenance reviewed, as above.    Follow-up: Follow up for Routine Physical.    ______________________________________________________________________    Chief Complaint  Chief Complaint   Patient presents with    Sore Throat     throat problems       HPI  Ron Christina is a 63 y.o. male with multiple medical diagnoses as listed in the medical history and problem list that presents for throat pain for years.  Pt is known to me with his last appointment 01/2018.      He reports that he has been having some intermittent symptoms for years that when he lays down he feels that he has a "flap" in his upper esophagus.  Has to clear his throat or lay on his side and it would go away.  He had a severe episode that started after having some trang's where the laying on the side didn't fix it.  No heartburn, dysphagia, congestion, runny nose, post-nasal drip.  No CP, palpitations, voice change.  Never symptoms when he stands up.  No clear dietary trigger.  Occurs several times a week.        PAST MEDICAL HISTORY:  Past Medical History:   Diagnosis Date    Allergy     Deep vein thrombosis        PAST SURGICAL HISTORY:  Past Surgical History:   Procedure Laterality Date    HERNIA REPAIR         SOCIAL HISTORY:  Social History     Socioeconomic History    Marital status:      Spouse name: Not on file    Number of children: Not on file "    Years of education: Not on file    Highest education level: Not on file   Occupational History    Not on file   Social Needs    Financial resource strain: Not on file    Food insecurity:     Worry: Not on file     Inability: Not on file    Transportation needs:     Medical: Not on file     Non-medical: Not on file   Tobacco Use    Smoking status: Never Smoker    Smokeless tobacco: Never Used   Substance and Sexual Activity    Alcohol use: Yes    Drug use: No    Sexual activity: Not on file   Lifestyle    Physical activity:     Days per week: Not on file     Minutes per session: Not on file    Stress: Not on file   Relationships    Social connections:     Talks on phone: Not on file     Gets together: Not on file     Attends Roman [employer] service: Not on file     Active member of club or organization: Not on file     Attends meetings of clubs or organizations: Not on file     Relationship status: Not on file   Other Topics Concern    Not on file   Social History Narrative    Not on file       FAMILY HISTORY:  Family History   Problem Relation Age of Onset    Alcohol abuse Mother     Asthma Father        ALLERGIES AND MEDICATIONS: updated and reviewed.  Review of patient's allergies indicates:   Allergen Reactions    Penicillins Other (See Comments)     Current Outpatient Medications   Medication Sig Dispense Refill    omeprazole (PRILOSEC) 40 MG capsule Take 1 capsule (40 mg total) by mouth every morning. 30 minutes before breakfast or coffee 90 capsule 0     No current facility-administered medications for this visit.          ROS  Review of Systems   Constitutional: Negative for chills, fever and unexpected weight change.   HENT: Negative for congestion, dental problem, ear pain, hearing loss, rhinorrhea, sore throat and trouble swallowing.    Eyes: Negative for discharge, redness and visual disturbance.   Respiratory: Negative for cough, chest tightness, shortness of breath and wheezing.     Cardiovascular: Negative for chest pain, palpitations and leg swelling.   Gastrointestinal: Negative for abdominal pain, constipation, diarrhea, nausea and vomiting.   Endocrine: Negative for polydipsia, polyphagia and polyuria.   Genitourinary: Negative for decreased urine volume, dysuria and hematuria.   Musculoskeletal: Negative for arthralgias and myalgias.   Skin: Negative for color change and rash.   Neurological: Negative for dizziness, weakness, light-headedness and headaches.   Psychiatric/Behavioral: Negative for decreased concentration, dysphoric mood, sleep disturbance and suicidal ideas.           Physical Exam  Vitals:    07/18/19 1133   BP: 130/88   Pulse: 87   Temp: 98.3 °F (36.8 °C)   SpO2: 98%   Weight: 108.6 kg (239 lb 5 oz)   Height: 6' (1.829 m)    Body mass index is 32.46 kg/m².  Weight: 108.6 kg (239 lb 5 oz)   Height: 6' (182.9 cm)   Physical Exam   Constitutional: He is oriented to person, place, and time. He appears well-developed and well-nourished. No distress.   HENT:   Head: Normocephalic and atraumatic.   Right Ear: Tympanic membrane, external ear and ear canal normal.   Left Ear: Tympanic membrane, external ear and ear canal normal.   Nose: Mucosal edema present. No rhinorrhea. Right sinus exhibits no maxillary sinus tenderness and no frontal sinus tenderness. Left sinus exhibits no maxillary sinus tenderness and no frontal sinus tenderness.   Mouth/Throat: Mucous membranes are normal. Uvula swelling present. Posterior oropharyngeal erythema present. No oropharyngeal exudate, posterior oropharyngeal edema or tonsillar abscesses. No tonsillar exudate.   Eyes: Pupils are equal, round, and reactive to light. Conjunctivae, EOM and lids are normal. No scleral icterus.   Neck: Full passive range of motion without pain. Neck supple. No JVD present. Carotid bruit is not present. No thyromegaly present.   Cardiovascular: Normal rate, regular rhythm, normal heart sounds and intact distal  pulses. Exam reveals no S3, no S4 and no friction rub.   No murmur heard.  Pulmonary/Chest: Effort normal and breath sounds normal. He has no wheezes. He has no rhonchi. He has no rales.   Abdominal: Soft. Bowel sounds are normal. There is no tenderness.   Musculoskeletal: He exhibits no edema or tenderness.   Lymphadenopathy:        Head (right side): No submental and no submandibular adenopathy present.        Head (left side): No submental and no submandibular adenopathy present.     He has no cervical adenopathy.   Neurological: He is alert and oriented to person, place, and time.   Motor grossly intact.  Sensory grossly intact.  Symmetric facial movements palate elevated symmetrically tongue midline   Skin: Skin is warm and dry. No rash noted.   Psychiatric: He has a normal mood and affect. His speech is normal and behavior is normal. Thought content normal.         Health Maintenance       Date Due Completion Date    TETANUS VACCINE 07/28/1972 ---    Shingles Vaccine (1 of 2) 07/28/2004 ---    Influenza Vaccine 08/01/2019 12/17/2018    Override on 11/16/2016: Declined    Override on 9/16/2015: Declined    Fecal Occult Blood Test (FOBT)/FitKit 12/17/2019 12/17/2018    Lipid Panel 10/21/2021 10/21/2016

## 2019-09-06 ENCOUNTER — LAB VISIT (OUTPATIENT)
Dept: LAB | Facility: HOSPITAL | Age: 64
End: 2019-09-06
Attending: INTERNAL MEDICINE
Payer: COMMERCIAL

## 2019-09-06 DIAGNOSIS — Z12.5 SCREENING FOR PROSTATE CANCER: ICD-10-CM

## 2019-09-06 DIAGNOSIS — Z00.00 ROUTINE MEDICAL EXAM: ICD-10-CM

## 2019-09-06 LAB
ALBUMIN SERPL BCP-MCNC: 4.2 G/DL (ref 3.5–5.2)
ALP SERPL-CCNC: 56 U/L (ref 55–135)
ALT SERPL W/O P-5'-P-CCNC: 44 U/L (ref 10–44)
ANION GAP SERPL CALC-SCNC: 6 MMOL/L (ref 8–16)
AST SERPL-CCNC: 29 U/L (ref 10–40)
BASOPHILS # BLD AUTO: 0.06 K/UL (ref 0–0.2)
BASOPHILS NFR BLD: 0.9 % (ref 0–1.9)
BILIRUB SERPL-MCNC: 0.9 MG/DL (ref 0.1–1)
BUN SERPL-MCNC: 18 MG/DL (ref 8–23)
CALCIUM SERPL-MCNC: 9.7 MG/DL (ref 8.7–10.5)
CHLORIDE SERPL-SCNC: 105 MMOL/L (ref 95–110)
CHOLEST SERPL-MCNC: 229 MG/DL (ref 120–199)
CHOLEST/HDLC SERPL: 4.3 {RATIO} (ref 2–5)
CO2 SERPL-SCNC: 27 MMOL/L (ref 23–29)
COMPLEXED PSA SERPL-MCNC: 0.88 NG/ML (ref 0–4)
CREAT SERPL-MCNC: 0.9 MG/DL (ref 0.5–1.4)
DIFFERENTIAL METHOD: ABNORMAL
EOSINOPHIL # BLD AUTO: 0.2 K/UL (ref 0–0.5)
EOSINOPHIL NFR BLD: 3 % (ref 0–8)
ERYTHROCYTE [DISTWIDTH] IN BLOOD BY AUTOMATED COUNT: 13.2 % (ref 11.5–14.5)
EST. GFR  (AFRICAN AMERICAN): >60 ML/MIN/1.73 M^2
EST. GFR  (NON AFRICAN AMERICAN): >60 ML/MIN/1.73 M^2
GLUCOSE SERPL-MCNC: 96 MG/DL (ref 70–110)
HCT VFR BLD AUTO: 48.5 % (ref 40–54)
HDLC SERPL-MCNC: 53 MG/DL (ref 40–75)
HDLC SERPL: 23.1 % (ref 20–50)
HGB BLD-MCNC: 16 G/DL (ref 14–18)
IMM GRANULOCYTES # BLD AUTO: 0.02 K/UL (ref 0–0.04)
IMM GRANULOCYTES NFR BLD AUTO: 0.3 % (ref 0–0.5)
LDLC SERPL CALC-MCNC: 158.8 MG/DL (ref 63–159)
LYMPHOCYTES # BLD AUTO: 2.3 K/UL (ref 1–4.8)
LYMPHOCYTES NFR BLD: 35 % (ref 18–48)
MCH RBC QN AUTO: 32.6 PG (ref 27–31)
MCHC RBC AUTO-ENTMCNC: 33 G/DL (ref 32–36)
MCV RBC AUTO: 99 FL (ref 82–98)
MONOCYTES # BLD AUTO: 0.7 K/UL (ref 0.3–1)
MONOCYTES NFR BLD: 10.6 % (ref 4–15)
NEUTROPHILS # BLD AUTO: 3.2 K/UL (ref 1.8–7.7)
NEUTROPHILS NFR BLD: 50.2 % (ref 38–73)
NONHDLC SERPL-MCNC: 176 MG/DL
NRBC BLD-RTO: 0 /100 WBC
PLATELET # BLD AUTO: 232 K/UL (ref 150–350)
PMV BLD AUTO: 10.8 FL (ref 9.2–12.9)
POTASSIUM SERPL-SCNC: 4.3 MMOL/L (ref 3.5–5.1)
PROT SERPL-MCNC: 7.8 G/DL (ref 6–8.4)
RBC # BLD AUTO: 4.91 M/UL (ref 4.6–6.2)
SODIUM SERPL-SCNC: 138 MMOL/L (ref 136–145)
TRIGL SERPL-MCNC: 86 MG/DL (ref 30–150)
WBC # BLD AUTO: 6.42 K/UL (ref 3.9–12.7)

## 2019-09-06 PROCEDURE — 36415 COLL VENOUS BLD VENIPUNCTURE: CPT | Mod: PO

## 2019-09-06 PROCEDURE — 80053 COMPREHEN METABOLIC PANEL: CPT

## 2019-09-06 PROCEDURE — 85025 COMPLETE CBC W/AUTO DIFF WBC: CPT

## 2019-09-06 PROCEDURE — 84153 ASSAY OF PSA TOTAL: CPT

## 2019-09-06 PROCEDURE — 80061 LIPID PANEL: CPT

## 2019-09-10 ENCOUNTER — OFFICE VISIT (OUTPATIENT)
Dept: FAMILY MEDICINE | Facility: CLINIC | Age: 64
End: 2019-09-10
Payer: COMMERCIAL

## 2019-09-10 VITALS
HEIGHT: 72 IN | DIASTOLIC BLOOD PRESSURE: 90 MMHG | OXYGEN SATURATION: 97 % | SYSTOLIC BLOOD PRESSURE: 120 MMHG | TEMPERATURE: 98 F | BODY MASS INDEX: 33.37 KG/M2 | WEIGHT: 246.38 LBS | HEART RATE: 91 BPM

## 2019-09-10 DIAGNOSIS — E66.9 OBESITY (BMI 30.0-34.9): ICD-10-CM

## 2019-09-10 DIAGNOSIS — Z00.00 ROUTINE MEDICAL EXAM: Primary | ICD-10-CM

## 2019-09-10 DIAGNOSIS — E78.5 DYSLIPIDEMIA: Chronic | ICD-10-CM

## 2019-09-10 DIAGNOSIS — R03.0 ELEVATED BLOOD PRESSURE READING WITHOUT DIAGNOSIS OF HYPERTENSION: ICD-10-CM

## 2019-09-10 DIAGNOSIS — K13.79 HYPERTROPHY OF UVULA: Chronic | ICD-10-CM

## 2019-09-10 PROCEDURE — 99397 PER PM REEVAL EST PAT 65+ YR: CPT | Mod: S$GLB,,, | Performed by: INTERNAL MEDICINE

## 2019-09-10 PROCEDURE — 99999 PR PBB SHADOW E&M-EST. PATIENT-LVL III: ICD-10-PCS | Mod: PBBFAC,,, | Performed by: INTERNAL MEDICINE

## 2019-09-10 PROCEDURE — 99999 PR PBB SHADOW E&M-EST. PATIENT-LVL III: CPT | Mod: PBBFAC,,, | Performed by: INTERNAL MEDICINE

## 2019-09-10 PROCEDURE — 99397 PR PREVENTIVE VISIT,EST,65 & OVER: ICD-10-PCS | Mod: S$GLB,,, | Performed by: INTERNAL MEDICINE

## 2019-09-10 NOTE — ASSESSMENT & PLAN NOTE
Counseled on recommendations for statin therapy given high CVD risk score.  He is not ready to start this at this time.  Would like to look into this some more on his own.  Continue with healthy eating habits.

## 2019-09-10 NOTE — PATIENT INSTRUCTIONS
Pravastatin (Pravachol)    Prevnar 13    Www.cdc.gov is a great resource for info on vaccines, medications, and health conditions.      [email or web address] is also good.

## 2019-09-10 NOTE — PROGRESS NOTES
Assessment & Plan  Problem List Items Addressed This Visit        ENT    Hypertrophy of uvula (Chronic)    Overview     Causes dysphagia. ENT recommended excision.  Patient prefers not to have surgery.          Current Assessment & Plan     Monitor             Cardiac/Vascular    Elevated blood pressure reading without diagnosis of hypertension (Chronic)    Overview     Good home BP log         Current Assessment & Plan     Stable.  Monitor         Dyslipidemia (Chronic)    Current Assessment & Plan     Counseled on recommendations for statin therapy given high CVD risk score.  He is not ready to start this at this time.  Would like to look into this some more on his own.  Continue with healthy eating habits.             Endocrine    Obesity (BMI 30.0-34.9) (Chronic)    Current Assessment & Plan     The patient is asked to make an attempt to improve diet and exercise patterns to aid in medical management of this problem. We specifically discussed cutting calorie intake by 500-1000 calories per day for a goal of a 1-2 pound weight loss and recommendations for a mostly plant based diet with limited red meats/refined grains/processed foods/added sugars.            Other Visit Diagnoses     Routine medical exam    -  Primary  -    Discussed healthy diet, regular exercise, necessary labs, age appropriate cancer screening, and routine vaccinations.       Relevant Orders    CBC auto differential    Comprehensive metabolic panel    Lipid panel    PSA, Screening            Health Maintenance reviewed, declined all vaccines today.  He seems pre-contemplative about the PCV.    Follow-up: Follow up in about 1 year (around 9/10/2020) for Routine Physical.    ______________________________________________________________________    Chief Complaint  Chief Complaint   Patient presents with    Hyperlipidemia     follow up        HPI  Ron Christina is a 64 y.o. male with multiple medical diagnoses as listed in the medical history  and problem list that presents for routine physical.  Pt is known to me with his last appointment 7/18/2019.  He had labs prior to this OV that showed reassuring CBC, CMP, PSA.  FitKit negative. Lipids improved slightly.     His PPI trial was ineffective for his dysphagia.      The 10-year ASCVD risk score (Ervin VALADEZ Jr., et al., 2013) is: 12.1%    Values used to calculate the score:      Age: 64 years      Sex: Male      Is Non- : No      Diabetic: No      Tobacco smoker: No      Systolic Blood Pressure: 120 mmHg      Is BP treated: No      HDL Cholesterol: 53 mg/dL      Total Cholesterol: 229 mg/dL    PAST MEDICAL HISTORY:  Past Medical History:   Diagnosis Date    Allergy     Deep vein thrombosis        PAST SURGICAL HISTORY:  Past Surgical History:   Procedure Laterality Date    HERNIA REPAIR         SOCIAL HISTORY:  Social History     Socioeconomic History    Marital status:      Spouse name: Not on file    Number of children: Not on file    Years of education: Not on file    Highest education level: Not on file   Occupational History    Not on file   Social Needs    Financial resource strain: Not on file    Food insecurity:     Worry: Not on file     Inability: Not on file    Transportation needs:     Medical: Not on file     Non-medical: Not on file   Tobacco Use    Smoking status: Never Smoker    Smokeless tobacco: Never Used   Substance and Sexual Activity    Alcohol use: Yes    Drug use: No    Sexual activity: Not on file   Lifestyle    Physical activity:     Days per week: Not on file     Minutes per session: Not on file    Stress: Not on file   Relationships    Social connections:     Talks on phone: Not on file     Gets together: Not on file     Attends Denominational service: Not on file     Active member of club or organization: Not on file     Attends meetings of clubs or organizations: Not on file     Relationship status: Not on file   Other Topics Concern     Not on file   Social History Narrative    Not on file       FAMILY HISTORY:  Family History   Problem Relation Age of Onset    Alcohol abuse Mother     Asthma Father        ALLERGIES AND MEDICATIONS: updated and reviewed.  Review of patient's allergies indicates:   Allergen Reactions    Penicillins Other (See Comments)     No current outpatient medications on file.     No current facility-administered medications for this visit.          ROS  Review of Systems   Constitutional: Negative for chills, fever and unexpected weight change.   HENT: Negative for congestion, dental problem, ear pain, hearing loss, rhinorrhea, sore throat and trouble swallowing.    Eyes: Negative for discharge, redness and visual disturbance.   Respiratory: Negative for cough, chest tightness, shortness of breath and wheezing.    Cardiovascular: Negative for chest pain, palpitations and leg swelling.   Gastrointestinal: Negative for abdominal pain, constipation, diarrhea, nausea and vomiting.   Endocrine: Negative for polydipsia, polyphagia and polyuria.   Genitourinary: Negative for decreased urine volume, dysuria and hematuria.   Musculoskeletal: Negative for arthralgias and myalgias.   Skin: Negative for color change and rash.   Neurological: Negative for dizziness, weakness, light-headedness and headaches.   Psychiatric/Behavioral: Negative for decreased concentration, dysphoric mood, sleep disturbance and suicidal ideas.           Physical Exam  Vitals:    09/10/19 0705   BP: (!) 120/90   BP Location: Left arm   Patient Position: Sitting   BP Method: Large (Manual)   Pulse: 91   Temp: 97.7 °F (36.5 °C)   TempSrc: Oral   SpO2: 97%   Weight: 111.7 kg (246 lb 5.8 oz)   Height: 6' (1.829 m)    Body mass index is 33.41 kg/m².  Weight: 111.7 kg (246 lb 5.8 oz)   Height: 6' (182.9 cm)   Physical Exam   Constitutional: He is oriented to person, place, and time. He appears well-developed and well-nourished. No distress.   HENT:   Head:  Normocephalic and atraumatic.   Right Ear: Tympanic membrane, external ear and ear canal normal.   Left Ear: Tympanic membrane, external ear and ear canal normal.   Nose: Nose normal. No septal deviation. Right sinus exhibits no maxillary sinus tenderness and no frontal sinus tenderness. Left sinus exhibits no maxillary sinus tenderness and no frontal sinus tenderness.   Mouth/Throat: Uvula is midline, oropharynx is clear and moist and mucous membranes are normal. No tonsillar exudate.   Eyes: Pupils are equal, round, and reactive to light. Conjunctivae and EOM are normal. Right eye exhibits no discharge. Left eye exhibits no discharge. No scleral icterus.   Neck: Neck supple. No JVD present. No spinous process tenderness and no muscular tenderness present. Carotid bruit is not present. No tracheal deviation present. No thyroid mass and no thyromegaly present.   Cardiovascular: Normal rate, regular rhythm, normal heart sounds and intact distal pulses. Exam reveals no S3, no S4 and no friction rub.   No murmur heard.  Pulmonary/Chest: Effort normal and breath sounds normal. He has no wheezes. He has no rhonchi. He has no rales.   Abdominal: Soft. Bowel sounds are normal. He exhibits no distension. There is no tenderness. There is no rebound, no guarding and no CVA tenderness.   Musculoskeletal: Normal range of motion. He exhibits no edema or tenderness.   Lymphadenopathy:        Head (right side): No submental and no submandibular adenopathy present.        Head (left side): No submental and no submandibular adenopathy present.     He has no cervical adenopathy.   Neurological: He is alert and oriented to person, place, and time. Coordination normal.   Motor grossly intact.  Sensation grossly normal.  Symmetric facial movements palate elevated symmetrically tongue midline    Skin: Skin is warm and dry. Capillary refill takes less than 2 seconds. No rash noted. No cyanosis. Nails show no clubbing.   Psychiatric: He  has a normal mood and affect. His speech is normal and behavior is normal. Thought content normal. Cognition and memory are normal.         Health Maintenance       Date Due Completion Date    TETANUS VACCINE 07/28/1972 ---    Shingles Vaccine (1 of 2) 07/28/2004 ---    Pneumococcal Vaccine (65+ Low/Medium Risk) (1 of 2 - PCV13) 07/28/2019 ---    Influenza Vaccine (1) 09/01/2019 12/17/2018    Fecal Occult Blood Test (FOBT)/FitKit 12/17/2019 12/17/2018    Lipid Panel 09/06/2024 9/6/2019

## 2019-10-30 ENCOUNTER — OFFICE VISIT (OUTPATIENT)
Dept: FAMILY MEDICINE | Facility: CLINIC | Age: 64
End: 2019-10-30
Payer: COMMERCIAL

## 2019-10-30 VITALS
OXYGEN SATURATION: 96 % | HEART RATE: 90 BPM | SYSTOLIC BLOOD PRESSURE: 137 MMHG | BODY MASS INDEX: 33.22 KG/M2 | DIASTOLIC BLOOD PRESSURE: 88 MMHG | HEIGHT: 72 IN | TEMPERATURE: 98 F | WEIGHT: 245.25 LBS

## 2019-10-30 DIAGNOSIS — M79.621 PAIN OF RIGHT UPPER ARM: Primary | ICD-10-CM

## 2019-10-30 PROCEDURE — 99214 PR OFFICE/OUTPT VISIT, EST, LEVL IV, 30-39 MIN: ICD-10-PCS | Mod: S$GLB,,, | Performed by: NURSE PRACTITIONER

## 2019-10-30 PROCEDURE — 1101F PT FALLS ASSESS-DOCD LE1/YR: CPT | Mod: CPTII,S$GLB,, | Performed by: NURSE PRACTITIONER

## 2019-10-30 PROCEDURE — 99999 PR PBB SHADOW E&M-EST. PATIENT-LVL IV: CPT | Mod: PBBFAC,,, | Performed by: NURSE PRACTITIONER

## 2019-10-30 PROCEDURE — 99999 PR PBB SHADOW E&M-EST. PATIENT-LVL IV: ICD-10-PCS | Mod: PBBFAC,,, | Performed by: NURSE PRACTITIONER

## 2019-10-30 PROCEDURE — 3008F BODY MASS INDEX DOCD: CPT | Mod: CPTII,S$GLB,, | Performed by: NURSE PRACTITIONER

## 2019-10-30 PROCEDURE — 1101F PR PT FALLS ASSESS DOC 0-1 FALLS W/OUT INJ PAST YR: ICD-10-PCS | Mod: CPTII,S$GLB,, | Performed by: NURSE PRACTITIONER

## 2019-10-30 PROCEDURE — 99214 OFFICE O/P EST MOD 30 MIN: CPT | Mod: S$GLB,,, | Performed by: NURSE PRACTITIONER

## 2019-10-30 PROCEDURE — 3008F PR BODY MASS INDEX (BMI) DOCUMENTED: ICD-10-PCS | Mod: CPTII,S$GLB,, | Performed by: NURSE PRACTITIONER

## 2019-10-30 NOTE — PROGRESS NOTES
History of Present Illness   Ron Christina is a 64 y.o. man with medical history as listed below who presents today for evaluation of right upper arm pain. Pain began about three months ago after lifting a jacuzzi. He was seen by PCP and has been doing the stretches and exercises as recommended with PRN NSAID use with no relief. Pain begins near the elbow and radiates in to the forearm and up in to the bicep. He notices the pain with heavy lifting or attempting to grasp/pull objects. He denies numbness or tingling and has no limitation in ROM. He is requesting a referral to orthopedics for further evaluation. He has no additional complaints and is otherwise healthy on today's visit.    Past Medical History:   Diagnosis Date    Allergy     Deep vein thrombosis        Past Surgical History:   Procedure Laterality Date    HERNIA REPAIR         Social History     Socioeconomic History    Marital status:      Spouse name: Not on file    Number of children: Not on file    Years of education: Not on file    Highest education level: Not on file   Occupational History    Not on file   Social Needs    Financial resource strain: Not on file    Food insecurity:     Worry: Not on file     Inability: Not on file    Transportation needs:     Medical: Not on file     Non-medical: Not on file   Tobacco Use    Smoking status: Never Smoker    Smokeless tobacco: Never Used   Substance and Sexual Activity    Alcohol use: Yes    Drug use: No    Sexual activity: Not on file   Lifestyle    Physical activity:     Days per week: Not on file     Minutes per session: Not on file    Stress: Not on file   Relationships    Social connections:     Talks on phone: Not on file     Gets together: Not on file     Attends Anabaptist service: Not on file     Active member of club or organization: Not on file     Attends meetings of clubs or organizations: Not on file     Relationship status: Not on file   Other Topics Concern     Not on file   Social History Narrative    Not on file       Family History   Problem Relation Age of Onset    Alcohol abuse Mother     Asthma Father        Review of Systems  Review of Systems   Constitutional: Negative for fever.   Musculoskeletal: Positive for myalgias (right arm). Negative for falls and joint pain.   Skin: Negative for itching and rash.   Neurological: Negative for tingling, sensory change and focal weakness.     A complete review of systems was otherwise negative.    Physical Exam  BP (!) 140/86   Pulse 90   Temp 98.4 °F (36.9 °C) (Oral)   Ht 6' (1.829 m)   Wt 111.3 kg (245 lb 4.2 oz)   SpO2 96%   BMI 33.26 kg/m²   General appearance: alert, appears stated age, cooperative and no distress  Skin: Skin color, texture, turgor normal. No rashes or lesions  Neurologic: Grossly normal  Musculoskeletal: Right arm exhibits TTP over lateral aspect of elbow that radiates in to forearm and into bicep; FROM to RUE; there is no swelling, crepitus, or obvious deformity; distal sensation, ROM, and pulses intact.    Assessment/Plan  Pain of right upper arm  Continue stretches and exercises.  NSAID PRN.  Avoid strenuous activity and heavy lifting.  Referral to orthopedics for further evaluation.  -     Ambulatory referral/consult to Orthopedics; Future; Expected date: 10/30/2019    Patient has verbalized understanding and is in agreement with plan of care.    Follow up if symptoms worsen or fail to improve.

## 2019-10-31 ENCOUNTER — PATIENT MESSAGE (OUTPATIENT)
Dept: FAMILY MEDICINE | Facility: CLINIC | Age: 64
End: 2019-10-31

## 2019-11-22 ENCOUNTER — OFFICE VISIT (OUTPATIENT)
Dept: FAMILY MEDICINE | Facility: CLINIC | Age: 64
End: 2019-11-22
Payer: COMMERCIAL

## 2019-11-22 VITALS
HEART RATE: 87 BPM | SYSTOLIC BLOOD PRESSURE: 134 MMHG | HEIGHT: 72 IN | BODY MASS INDEX: 32.51 KG/M2 | OXYGEN SATURATION: 96 % | DIASTOLIC BLOOD PRESSURE: 82 MMHG | WEIGHT: 240 LBS | TEMPERATURE: 98 F

## 2019-11-22 DIAGNOSIS — K42.9 UMBILICAL HERNIA WITHOUT OBSTRUCTION AND WITHOUT GANGRENE: Chronic | ICD-10-CM

## 2019-11-22 DIAGNOSIS — R05.8 POST-VIRAL COUGH SYNDROME: Primary | ICD-10-CM

## 2019-11-22 PROCEDURE — 1101F PR PT FALLS ASSESS DOC 0-1 FALLS W/OUT INJ PAST YR: ICD-10-PCS | Mod: CPTII,S$GLB,, | Performed by: INTERNAL MEDICINE

## 2019-11-22 PROCEDURE — 99999 PR PBB SHADOW E&M-EST. PATIENT-LVL III: ICD-10-PCS | Mod: PBBFAC,,, | Performed by: INTERNAL MEDICINE

## 2019-11-22 PROCEDURE — 99214 PR OFFICE/OUTPT VISIT, EST, LEVL IV, 30-39 MIN: ICD-10-PCS | Mod: S$GLB,,, | Performed by: INTERNAL MEDICINE

## 2019-11-22 PROCEDURE — 3008F BODY MASS INDEX DOCD: CPT | Mod: CPTII,S$GLB,, | Performed by: INTERNAL MEDICINE

## 2019-11-22 PROCEDURE — 99214 OFFICE O/P EST MOD 30 MIN: CPT | Mod: S$GLB,,, | Performed by: INTERNAL MEDICINE

## 2019-11-22 PROCEDURE — 1101F PT FALLS ASSESS-DOCD LE1/YR: CPT | Mod: CPTII,S$GLB,, | Performed by: INTERNAL MEDICINE

## 2019-11-22 PROCEDURE — 99999 PR PBB SHADOW E&M-EST. PATIENT-LVL III: CPT | Mod: PBBFAC,,, | Performed by: INTERNAL MEDICINE

## 2019-11-22 PROCEDURE — 3008F PR BODY MASS INDEX (BMI) DOCUMENTED: ICD-10-PCS | Mod: CPTII,S$GLB,, | Performed by: INTERNAL MEDICINE

## 2019-11-22 RX ORDER — BENZONATATE 200 MG/1
200 CAPSULE ORAL 3 TIMES DAILY PRN
Qty: 30 CAPSULE | Refills: 0 | Status: SHIPPED | OUTPATIENT
Start: 2019-11-22 | End: 2021-03-19

## 2019-11-22 NOTE — PATIENT INSTRUCTIONS
Hernia (Adult)    A hernia can happen when there is a weakness or defect in the wall of the abdomen or groin. Intestines or nearby tissues may move from their usual location and push through the weakness in the wall. This can cause a hernia (bulge) you may see or feel.  Causes and Risk Factors   A hernia may be present at birth. Or it may be caused by the wear and tear of daily living. Certain factors can make a hernia more likely. These can include:  · Heavy lifting  · Straining, whether from lifting, movement, or constipation  · Chronic cough  · Injury to the abdominal wall  · Excess weight  · Pregnancy  · Prior surgery  · Older age  · Family history of hernia  Symptoms  Symptoms of a hernia may come on suddenly. Or they may appear slowly over time. Some common symptoms include:  · Bulge in the groin area, around the navel, or in the scrotum (the bulge may get bigger when you stand and go away when you lie down)  · Pain or pressure around the bulge  · Pain during activities such as lifting, coughing, or sneezing  · A feeling of weakness or pressure in the groin  · Pain or swelling in the scrotum  Types of hernias  There are different types of hernia. The type you have depends on its location:  · Inguinal: This type is in the groin or scrotum. It is more common in men.  · Femoral: This type is in the groin, upper thigh (where the leg bends), or labia. It is more common in women.  · Ventral: This type is in the abdominal wall.  · Umbilical: This type occurs around the navel (belly button).  · Incisional: This type occurs at the site of a previous surgery.  The condition of the hernia can help determine how urgently it needs to be treated.  · Reducible: It goes back in by itself, or it can be pushed back in.  · Irreducible: It cant be pushed back in.  · Incarcerated/Strangulated: The intestine is trapped (incarcerated). If this happens, you wont be able to push the bulge back in. If the incarcerated hernia isnt  treated, it may become strangulated. This means the area loses blood supply and the tissue may die. This requires emergency surgery! Treatment is needed right away!  In most cases, a hernia will not heal on its own. Surgery is usually needed to repair the defect in the abdominal wall or groin. Youll be told more about surgery, if needed.  If your symptoms are not severe, treatment may sometimes be delayed. In such cases, regular follow-up visits with the provider will be needed. Youll be asked to keep track of your symptoms and to watch for signs of more serious problems. You may also be given guidelines similar to the home care instructions below.  Home Care  To help keep a hernia from getting worse, you may be advised to:  · Avoid heavy lifting and straining as directed.  · Take steps to prevent constipation, such as eating more fiber and drinking more water. This may help reduce straining that can occur when having a bowel movement. Reducing straining may help keep your symptoms from getting worse.  · Maintain a healthy weight or lose excess weight. This can help reduce strain on abdominal muscles and tissues.  · Stop smoking. This can help prevent coughing that may also strain abdominal muscles and tissues.  Follow-up care  Follow up with your healthcare provider, or as directed. If imaging tests were done, they will be reviewed a doctor. You will be told the results and any new findings that may affect your care.  When to seek medical advice  Call your healthcare provider right away if any of these occur:  · Hernia hardens, swells, or grows larger  · Hernia can no longer be pushed back in  · Pain moves to the lower right abdomen (just below the waistline), or spreads to the back  Call 911  Call 911 right away if any of these occur:  · Nausea and vomiting  · Severe pain, redness, or tenderness in the area near the hernia  · Pain worsens quickly and doesnt get better  · Inability to have a bowel movement or  pass gas  · Fever of 100.4°F (38°C) or higher  · Trouble breathing  · Fainting  · Rapid heart rate  · Vomiting blood  · Large amounts of blood in stool  Date Last Reviewed: 6/9/2015  © 0700-2697 [employer]. [address] [place] [address], [place] [address]. All rights reserved. This information is not intended as a substitute for professional medical care. Always follow your healthcare professional's instructions.

## 2019-11-22 NOTE — PROGRESS NOTES
Assessment & Plan  Problem List Items Addressed This Visit        GI    Umbilical hernia without obstruction and without gangrene (Chronic)    Overview     Small.  Asymptomatic         Current Assessment & Plan     Counseled on dx, surgical correction vs watchful waiting, and when additional care would need to be sought.  Elected for watchful waiting.            Other Visit Diagnoses     Post-viral cough syndrome    -  Primary  -    Counseled on likely duration.  Tessalon for cough suppression.     Relevant Medications    benzonatate (TESSALON) 200 MG capsule            Health Maintenance reviewed, up tod ate.    Follow-up: Follow up if symptoms worsen or fail to improve.    ______________________________________________________________________    Chief Complaint  Chief Complaint   Patient presents with    Cough       HPI  Ron Christina is a 64 y.o. male with multiple medical diagnoses as listed in the medical history and problem list that presents for cough x a few weeks.  Pt is known to me with his last appointment 10/30/2019.      Started with dry scratchy throat.  Tried a toddy.  Travelled to multiple states to visit family that day.  Woke up the next morning and had runny nose, cough, hoarse voice, fatigue.  Throat improved.  Took Vit C, Nyquil with some relief.  Rested for several days and had improved energy levels, voice returned to normal.      At this point, his cough is mostly at night.  No F/C/NS.  Nyquil tried which did allow him to sleep but gives him a hangover effect the next day.       Also noted a lump in his belly button after lifting a hot tub.  No pain currently.     PAST MEDICAL HISTORY:  Past Medical History:   Diagnosis Date    Allergy     Deep vein thrombosis        PAST SURGICAL HISTORY:  Past Surgical History:   Procedure Laterality Date    HERNIA REPAIR         SOCIAL HISTORY:  Social History     Socioeconomic History    Marital status:      Spouse name: Not on file     Number of children: Not on file    Years of education: Not on file    Highest education level: Not on file   Occupational History    Not on file   Social Needs    Financial resource strain: Not on file    Food insecurity:     Worry: Not on file     Inability: Not on file    Transportation needs:     Medical: Not on file     Non-medical: Not on file   Tobacco Use    Smoking status: Never Smoker    Smokeless tobacco: Never Used   Substance and Sexual Activity    Alcohol use: Yes    Drug use: No    Sexual activity: Not on file   Lifestyle    Physical activity:     Days per week: Not on file     Minutes per session: Not on file    Stress: Not on file   Relationships    Social connections:     Talks on phone: Not on file     Gets together: Not on file     Attends [employer] service: Not on file     Active member of club or organization: Not on file     Attends meetings of clubs or organizations: Not on file     Relationship status: Not on file   Other Topics Concern    Not on file   Social History Narrative    Not on file       FAMILY HISTORY:  Family History   Problem Relation Age of Onset    Alcohol abuse Mother     Asthma Father        ALLERGIES AND MEDICATIONS: updated and reviewed.  Review of patient's allergies indicates:   Allergen Reactions    Penicillins Other (See Comments)     Current Outpatient Medications   Medication Sig Dispense Refill    benzonatate (TESSALON) 200 MG capsule Take 1 capsule (200 mg total) by mouth 3 (three) times daily as needed for Cough. 30 capsule 0     No current facility-administered medications for this visit.          ROS  Review of Systems   Constitutional: Negative for chills, fever and unexpected weight change.   HENT: Negative for congestion, dental problem, ear pain, hearing loss, rhinorrhea, sore throat and trouble swallowing.    Eyes: Negative for discharge, redness and visual disturbance.   Respiratory: Positive for cough. Negative for chest tightness,  shortness of breath and wheezing.    Cardiovascular: Negative for chest pain, palpitations and leg swelling.   Gastrointestinal: Negative for abdominal pain, constipation, diarrhea, nausea and vomiting.   Endocrine: Negative for polydipsia, polyphagia and polyuria.   Genitourinary: Negative for decreased urine volume, dysuria and hematuria.   Musculoskeletal: Negative for arthralgias and myalgias.   Skin: Negative for color change and rash.   Neurological: Negative for dizziness, weakness, light-headedness and headaches.   Psychiatric/Behavioral: Negative for decreased concentration, dysphoric mood, sleep disturbance and suicidal ideas.           Physical Exam  Vitals:    11/22/19 0754   BP: 134/82   BP Location: Right arm   Patient Position: Sitting   BP Method: Large (Automatic)   Pulse: 87   Temp: 98.1 °F (36.7 °C)   TempSrc: Oral   SpO2: 96%   Weight: 108.8 kg (239 lb 15.5 oz)   Height: 6' (1.829 m)    Body mass index is 32.55 kg/m².  Weight: 108.8 kg (239 lb 15.5 oz)   Height: 6' (182.9 cm)   Physical Exam   Constitutional: He is oriented to person, place, and time. He appears well-developed and well-nourished. No distress.   HENT:   Head: Normocephalic and atraumatic.   Eyes: Pupils are equal, round, and reactive to light. Conjunctivae, EOM and lids are normal. No scleral icterus.   Neck: Full passive range of motion without pain. Neck supple. No JVD present. Carotid bruit is not present. No thyromegaly present.   Cardiovascular: Normal rate, regular rhythm, normal heart sounds and intact distal pulses. Exam reveals no S3, no S4 and no friction rub.   No murmur heard.  Pulmonary/Chest: Effort normal and breath sounds normal. He has no wheezes. He has no rhonchi. He has no rales.   Abdominal: Soft. Bowel sounds are normal. There is no tenderness. A hernia (umbilical, easily reducible) is present.   Musculoskeletal: He exhibits no edema or tenderness.   Lymphadenopathy:        Head (right side): No submental and  no submandibular adenopathy present.        Head (left side): No submental and no submandibular adenopathy present.     He has no cervical adenopathy.   Neurological: He is alert and oriented to person, place, and time.   Motor grossly intact.  Sensory grossly intact.  Symmetric facial movements palate elevated symmetrically tongue midline   Skin: Skin is warm and dry. No rash noted.   Psychiatric: He has a normal mood and affect. His speech is normal and behavior is normal. Thought content normal.         Health Maintenance       Date Due Completion Date    TETANUS VACCINE 09/10/2020 (Originally 7/28/1972) ---    Pneumococcal Vaccine (65+ Low/Medium Risk) (1 of 2 - PCV13) 09/10/2020 (Originally 7/28/2019) ---    Shingles Vaccine (1 of 2) 09/10/2020 (Originally 7/28/2004) ---    Influenza Vaccine (1) 09/10/2020 (Originally 9/1/2019) 12/17/2018    Fecal Occult Blood Test (FOBT)/[employer] 12/17/2019 12/17/2018    PROSTATE-SPECIFIC ANTIGEN 09/06/2020 9/6/2019    Lipid Panel 09/06/2024 9/6/2019

## 2019-11-22 NOTE — ASSESSMENT & PLAN NOTE
Counseled on dx, surgical correction vs watchful waiting, and when additional care would need to be sought.  Elected for watchful waiting.

## 2019-12-11 DIAGNOSIS — M25.521 RIGHT ELBOW PAIN: Primary | ICD-10-CM

## 2019-12-12 ENCOUNTER — HOSPITAL ENCOUNTER (OUTPATIENT)
Dept: RADIOLOGY | Facility: HOSPITAL | Age: 64
Discharge: HOME | End: 2019-12-12
Attending: ORTHOPAEDIC SURGERY
Payer: COMMERCIAL

## 2019-12-12 ENCOUNTER — OFFICE VISIT (OUTPATIENT)
Dept: ORTHOPEDICS | Facility: CLINIC | Age: 64
End: 2019-12-12
Payer: COMMERCIAL

## 2019-12-12 VITALS
WEIGHT: 243.81 LBS | HEIGHT: 72 IN | SYSTOLIC BLOOD PRESSURE: 140 MMHG | OXYGEN SATURATION: 93 % | HEART RATE: 86 BPM | BODY MASS INDEX: 33.02 KG/M2 | RESPIRATION RATE: 17 BRPM | DIASTOLIC BLOOD PRESSURE: 90 MMHG

## 2019-12-12 DIAGNOSIS — M79.621 PAIN OF RIGHT UPPER ARM: Primary | ICD-10-CM

## 2019-12-12 DIAGNOSIS — M79.621 PAIN OF RIGHT UPPER ARM: ICD-10-CM

## 2019-12-12 PROCEDURE — 1101F PR PT FALLS ASSESS DOC 0-1 FALLS W/OUT INJ PAST YR: ICD-10-PCS | Mod: CPTII,S$GLB,, | Performed by: ORTHOPAEDIC SURGERY

## 2019-12-12 PROCEDURE — 99999 PR PBB SHADOW E&M-EST. PATIENT-LVL III: ICD-10-PCS | Mod: PBBFAC,,, | Performed by: ORTHOPAEDIC SURGERY

## 2019-12-12 PROCEDURE — 1101F PT FALLS ASSESS-DOCD LE1/YR: CPT | Mod: CPTII,S$GLB,, | Performed by: ORTHOPAEDIC SURGERY

## 2019-12-12 PROCEDURE — 99999 PR PBB SHADOW E&M-EST. PATIENT-LVL III: CPT | Mod: PBBFAC,,, | Performed by: ORTHOPAEDIC SURGERY

## 2019-12-12 PROCEDURE — 3008F BODY MASS INDEX DOCD: CPT | Mod: CPTII,S$GLB,, | Performed by: ORTHOPAEDIC SURGERY

## 2019-12-12 PROCEDURE — 3008F PR BODY MASS INDEX (BMI) DOCUMENTED: ICD-10-PCS | Mod: CPTII,S$GLB,, | Performed by: ORTHOPAEDIC SURGERY

## 2019-12-12 PROCEDURE — 99214 PR OFFICE/OUTPT VISIT, EST, LEVL IV, 30-39 MIN: ICD-10-PCS | Mod: S$GLB,,, | Performed by: ORTHOPAEDIC SURGERY

## 2019-12-12 PROCEDURE — 99214 OFFICE O/P EST MOD 30 MIN: CPT | Mod: S$GLB,,, | Performed by: ORTHOPAEDIC SURGERY

## 2019-12-12 RX ORDER — NAPROXEN 500 MG/1
500 TABLET ORAL 2 TIMES DAILY WITH MEALS
Qty: 28 TABLET | Refills: 0 | Status: SHIPPED | OUTPATIENT
Start: 2019-12-12 | End: 2019-12-26

## 2019-12-12 NOTE — LETTER
December 13, 2019      Tonya Blackman, MANDI  [address] [facility]  [place] [address]           [facility]  [address]  [place] [address]  Phone: [phone]          Patient: Rno Christina   MR Number: [id number]   YOB: 1955   Date of Visit: 12/12/2019       Dear Tonya Blackman:    Thank you for referring Ron Christina to me for evaluation. Attached you will find relevant portions of my assessment and plan of care.    If you have questions, please do not hesitate to call me. I look forward to following Ron Christina along with you.    Sincerely,    Noemy Bojorquez MD    Enclosure  CC:  No Recipients    If you would like to receive this communication electronically, please contact [email or web address] or [phone] to request more information on [employer] access.    For providers and/or their staff who would like to refer a patient to [facility], please contact us through our one-stop-shop provider referral line, [facility], at [phone].    If you feel you have received this communication in error or would no longer like to receive these types of communications, please e-mail [email or web address]

## 2019-12-12 NOTE — PROGRESS NOTES
Chief Complaint   Patient presents with    Right Elbow - Injury, Pain       This patient was seen in consultation at the request of Tonya Blackman     HPI: Ron Christina is a 64 y.o. male who presents today complaining of Injury and Pain of the Right Elbow     Duration of symptoms:  5 months  Trauma or new activity: yes - started when he lifted a hot tub over the summer in July.   States he was travelling in November and the [employer] agent questioned if his right arm was bothering him after he went through the millimeter scanner and frisked his arm but did not explain why he asked this question   Feels mot of the pain in the supper arm or elbow but does have pain in the forearm occasionally. Does not have a continuous radicular type pain   Pain is intermittent   Overall pain is not as bad as it was the first couple of months  Aggravating factors: lifting, using his ipad, moving blankets at night when he turns over   Relieving factors: rest   Radicular symptoms: no numbness, paresthesias   Associated symptoms: no  swelling and limited range of motion.    Prior treatment:  None     Pain does interfere with sleep and activities of daily living .    Has a golf tournament in [place] in June -- he wants to be better for this    This is the extent of the patient's complaints at this time.     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 Does do a lot of lifting around his house    Review of Systems   All other systems reviewed and are negative.        Review of patient's allergies indicates:   Allergen Reactions    Penicillins Other (See Comments)         Current Outpatient Medications:     benzonatate (TESSALON) 200 MG capsule, Take 1 capsule (200 mg total) by mouth 3 (three) times daily as needed for Cough. (Patient not taking: Reported on 12/12/2019), Disp: 30 capsule, Rfl: 0    Past Medical History:   Diagnosis Date    Allergy     Deep vein thrombosis        Patient Active  Problem List   Diagnosis    Dyslipidemia    Obesity (BMI 30.0-34.9)    Hypertrophy of uvula    Elevated blood pressure reading without diagnosis of hypertension    Umbilical hernia without obstruction and without gangrene       Past Surgical History:   Procedure Laterality Date    HERNIA REPAIR         Social History     Tobacco Use    Smoking status: Never Smoker    Smokeless tobacco: Never Used   Substance Use Topics    Alcohol use: Yes    Drug use: No       Family History   Problem Relation Age of Onset    Alcohol abuse Mother     Asthma Father        Physical Exam:   Vitals:    12/12/19 0821   BP: (!) 140/90   Pulse: 86   Resp: 17   SpO2: (!) 93%   Weight: 110.6 kg (243 lb 13.3 oz)   Height: 6' (1.829 m)   PainSc:   1   PainLoc: Elbow       General: Weight: 110.6 kg (243 lb 13.3 oz) Body mass index is 33.07 kg/m².   Patient is alert, awake and oriented to time, place and person. Mood and affect are appropriate.  Patient does not appear to be in any distress, denies any constitutional symptoms and appears stated age.   HEENT:  Pupils are equal and round, sclera are not injected. External examination of ears and nose reveals no abnormalities. Cranial nerves II-X are grossly intact  Neck: examination demonstrates painless limited active range of motion. Spurling's sign is negative  Skin:  no rashes, abrasions or open wounds on the affected extremity   Resp:  No respiratory distress or audible wheezing   CV: 2+  pulses, all extremities warm and well perfused   Right Upper extremity      Shoulder Range of Motion    Right     Left   (Active/Passive)       Forward Elevation     165/165            165/165  External rotation (arm at side)  40/45             40/45   Internal rotation behind the back  L5             L5     Range of motion is not painful     Acromioclavicular joint is not tender    Neer's negative  Hawkin's negative    Violeta's positive - pain no weakness  Drop arm negative  Bear-hug  negative      Cuff Strength     Right     Left   Supraspinatus        5/5    5/5  Infraspinatus     5/5    5/5  Subscapularis     5/5    5/5    Deltoid testing            5/5    5/5    Speeds negative  Yergasons negative    Elbow   Non tender over lateral epicondyle, no pain with resisted wrist or digit extension  Nontender over distal biceps    LTSI m/u/r  2+ RP  + EPL, IO, FDS, FDP      Shoulder and elbow exam unremarkable except pos jobes with pain no weakness      Imaging:  Three views of the elbow were negative for fracture degenerative changes    I personally reviewed and interpreted the patient's imaging obtained today in clinic     Assessment: 64 y.o. male with right arm pain.  History completely start off with lateral epicondylitis and now seems to have pain associated with subacromial bursitis and rotator cuff tendinopathy.    Plan:   - naproxen 500 mg p.o. b.i.d. X 2 weeks.  Take with food. The patient was advised that NSAID-type medications have two very important potential side effects: gastrointestinal irritation including hemorrhage and renal injuries. Instructions were given to take the medication with food and to stop for any GI upset. Call for vomiting, abdominal pain or black/bloody stools. The patient expresses understanding of these issues and questions were answered.  - if no improvement or pain worsens can consider injection or physical therapy  - Return to clinic as needed    All questions were answered in detail. The patient is in full agreement with the treatment plan and will proceed accordingly.    A note notifying Tonya Blackman of my findings was sent via the electronic medical record     This note was created by combination of typed  and M-Modal dictation. Transcription and phonetic errors may be present.  If there are any questions, please contact me.

## 2020-01-03 DIAGNOSIS — Z12.11 COLON CANCER SCREENING: ICD-10-CM

## 2020-01-09 ENCOUNTER — PATIENT MESSAGE (OUTPATIENT)
Dept: FAMILY MEDICINE | Facility: CLINIC | Age: 65
End: 2020-01-09

## 2020-01-09 DIAGNOSIS — K13.79 HYPERTROPHY OF UVULA: Primary | Chronic | ICD-10-CM

## 2020-01-10 ENCOUNTER — PATIENT MESSAGE (OUTPATIENT)
Dept: FAMILY MEDICINE | Facility: CLINIC | Age: 65
End: 2020-01-10

## 2020-01-22 ENCOUNTER — TELEPHONE (OUTPATIENT)
Dept: FAMILY MEDICINE | Facility: CLINIC | Age: 65
End: 2020-01-22

## 2020-01-22 NOTE — LETTER
January 22, 2020    Ron Christina  [address] [place] [address]             [facility]  [address] [place] [address]  Phone: [phone]  Fax: [phone] Dear Mr. Christina:    Sorry we were unable to contact you to schedule your ENT appointment. Please give the referral department a call at [phone].        If you have any questions or concerns, please don't hesitate to call.    Sincerely,        Blanco Pelletier MA

## 2020-02-14 ENCOUNTER — PATIENT OUTREACH (OUTPATIENT)
Dept: ADMINISTRATIVE | Facility: OTHER | Age: 65
End: 2020-02-14

## 2020-02-17 ENCOUNTER — OFFICE VISIT (OUTPATIENT)
Dept: OTOLARYNGOLOGY | Facility: CLINIC | Age: 65
End: 2020-02-17
Payer: COMMERCIAL

## 2020-02-17 DIAGNOSIS — R09.A2 GLOBUS SENSATION: Primary | ICD-10-CM

## 2020-02-17 DIAGNOSIS — H61.21 RIGHT EAR IMPACTED CERUMEN: ICD-10-CM

## 2020-02-17 DIAGNOSIS — K13.79 ELONGATED UVULA, ACQUIRED: ICD-10-CM

## 2020-02-17 DIAGNOSIS — R07.0 THROAT DISCOMFORT: ICD-10-CM

## 2020-02-17 PROCEDURE — 69210 REMOVE IMPACTED EAR WAX UNI: CPT | Mod: 51,S$GLB,, | Performed by: OTOLARYNGOLOGY

## 2020-02-17 PROCEDURE — 99999 PR PBB SHADOW E&M-EST. PATIENT-LVL III: CPT | Mod: PBBFAC,,, | Performed by: OTOLARYNGOLOGY

## 2020-02-17 PROCEDURE — 31575 DIAGNOSTIC LARYNGOSCOPY: CPT | Mod: S$GLB,,, | Performed by: OTOLARYNGOLOGY

## 2020-02-17 PROCEDURE — 99203 OFFICE O/P NEW LOW 30 MIN: CPT | Mod: 25,S$GLB,, | Performed by: OTOLARYNGOLOGY

## 2020-02-17 PROCEDURE — 1101F PT FALLS ASSESS-DOCD LE1/YR: CPT | Mod: CPTII,S$GLB,, | Performed by: OTOLARYNGOLOGY

## 2020-02-17 PROCEDURE — 99203 PR OFFICE/OUTPT VISIT, NEW, LEVL III, 30-44 MIN: ICD-10-PCS | Mod: 25,S$GLB,, | Performed by: OTOLARYNGOLOGY

## 2020-02-17 PROCEDURE — 31575 PR LARYNGOSCOPY, FLEXIBLE; DIAGNOSTIC: ICD-10-PCS | Mod: S$GLB,,, | Performed by: OTOLARYNGOLOGY

## 2020-02-17 PROCEDURE — 1101F PR PT FALLS ASSESS DOC 0-1 FALLS W/OUT INJ PAST YR: ICD-10-PCS | Mod: CPTII,S$GLB,, | Performed by: OTOLARYNGOLOGY

## 2020-02-17 PROCEDURE — 69210 PR REMOVAL IMPACTED CERUMEN REQUIRING INSTRUMENTATION, UNILATERAL: ICD-10-PCS | Mod: 51,S$GLB,, | Performed by: OTOLARYNGOLOGY

## 2020-02-17 PROCEDURE — 99999 PR PBB SHADOW E&M-EST. PATIENT-LVL III: ICD-10-PCS | Mod: PBBFAC,,, | Performed by: OTOLARYNGOLOGY

## 2020-02-17 NOTE — LETTER
February 18, 2020      Selvin Rae MD  [address]  [place] [address]           [facility]  [address]  [facility] [address]  Phone: [phone]  Fax: [phone]          Patient: Ron Christina   MR Number: [id number]   YOB: 1955   Date of Visit: 2/17/2020       Dear Dr. Selvin Rae:    Thank you for referring Ron Christina to me for evaluation. Attached you will find relevant portions of my assessment and plan of care.    If you have questions, please do not hesitate to call me. I look forward to following Ron Christina along with you.    Sincerely,    Cody Jade III, MD    Enclosure  CC:  No Recipients    If you would like to receive this communication electronically, please contact [email or web address] or [phone] to request more information on [employer] access.    For providers and/or their staff who would like to refer a patient to [facility], please contact us through our one-stop-shop provider referral line, [facility], at [phone].    If you feel you have received this communication in error or would no longer like to receive these types of communications, please e-mail [email or web address]

## 2020-02-17 NOTE — PROGRESS NOTES
"Subjective:       Patient ID: Ron Christina is a 64 y.o. male.    Chief Complaint: Other (feels like something in throat )    HPI: Mr. Christina is a 64-year-old  male who admits to cancelling an uvulectomy procedure scheduled to be performed by Dr. Rosalio Sinha in June of 2015.  Mr. Christina indicates a significant history of uvula swelling after drinking alcohol one night years ago.  He had one other episode of uvula swelling with no subsequent episodes.  His chief complaint today is a feeling of something in his throat.  He initially felt that there was a "flap" in his throat when lying supine.  He was forced to sleep on his side.    He is requesting and ENT examination including endoscopy today for examination of his airway.  He drinks 1-2 glasses of red wine per month only.  He is a nonsmoker.  He denies any significant GERD symptoms.  His PCP recently offered him a PPI as treatment for his globus symptoms possibly related to GERD.  The PPIs had no affect on him.  The course was finished about a month ago.  He denies any dysphagia symptoms to liquids or solids.  He denies regurgitation.  He denies hemoptysis.  He denies any unexplained weight loss( in fact, weight gain) .  He denies any previous allergy testing.  He denies any seasonal allergy symptoms.  He occasionally snore; he denies tasia apnea symptoms.  Denies any history of neck trauma or neck surgery.  He was examined by Dr. ANDREW Rae 11/22/2019 for cough.  He was diagnosed with postviral cough syndrome treated with Tessalon Perles.  He was also diagnosed with a chronic umbilical hernia.  Blood work 5 months ago indicated normal WBC with normal eosinophil count in the differential.    Past Medical History:   Diagnosis Date    Allergy     Deep vein thrombosis     Allergies:  Penicillins( significant rash at age 11)  Past Surgical History:   Procedure Laterality Date    HERNIA REPAIR       Current Outpatient Medications on File Prior to Visit "   Medication Sig Dispense Refill    benzonatate (TESSALON) 200 MG capsule Take 1 capsule (200 mg total) by mouth 3 (three) times daily as needed for Cough. 30 capsule 0     No current facility-administered medications on file prior to visit.      Review of Systems     Other:  Negative for rash.         Objective:     Height 6 ft  S Weight 243 lb  General:  Alert and oriented gentleman in no acute distress; he is not hoarse and he is not dyspneic.  He is easily able to swallow his own secretions.  The patient has consented to an endoscopic study today which has been explained him in detail.  He is transferred into the endoscopy suite.  Scope # [id number]  was used.  Procedure:  4% xylocaine and Vincent-Synephrine spray x3 in the right nasal passage.  Left nasal passage was spray x 2.  Cetacaine was applied to the oral cavity x 2.  After waiting several minutes scoping is performed.  The right nasal passage is used as the conduit for the study.  There is no evidence of polypoid disease or purulent discharge in either passage.  Left passage is slightly more patent anteriorly.  The nasopharynx contains some bands of adenoid tissue which appear within normal limits.  There is some evidence of posterior hypopharyngeal mucosal cobblestoning.  The epiglottis appears within normal limits.  There is evidence of an elongated uvula which is touching the midline of the base of the tongue (as well as the tip of the epiglottis, probably.  Laryngeal tissues appear within normal limits.  The piriform sinuses are clear. Mr. Christina has an excellent glottic airway.  The scope was withdrawn.  Physical Exam   Constitutional: He is oriented to person, place, and time. He appears well-developed and well-nourished.   HENT:   Head: Normocephalic.   Right Ear: Hearing, tympanic membrane and ear canal normal. No drainage. No foreign bodies. No mastoid tenderness. Tympanic membrane is not perforated. No decreased hearing is noted.   Left Ear:  Hearing, tympanic membrane and ear canal normal. No drainage. No foreign bodies. No mastoid tenderness. Tympanic membrane is not perforated. No decreased hearing is noted.   Ears:    Nose: No nose lacerations, nasal deformity, septal deviation or nasal septal hematoma. No epistaxis. Right sinus exhibits no maxillary sinus tenderness and no frontal sinus tenderness. Left sinus exhibits no maxillary sinus tenderness and no frontal sinus tenderness.   Mouth/Throat: Uvula is midline, oropharynx is clear and moist and mucous membranes are normal. He does not have dentures. No oral lesions. No trismus in the jaw. No uvula swelling or dental caries. No oropharyngeal exudate or tonsillar abscesses.       Neck: No thyromegaly present.   Pulmonary/Chest: Effort normal. No stridor.   Lymphadenopathy:     He has no cervical adenopathy.   Neurological: He is alert and oriented to person, place, and time.   Skin: No rash noted.   Psychiatric: His behavior is normal.       Assessment:       1. Globus sensation x one year   2. Elongated uvula, acquired    3. Throat discomfort        Plan:     Endoscopy performed  Pt. is a candidate for elective uvulectomy; Dr. DEMOND Martinez's card ( or Dr. OMAIRA Ann or Dr. ALO Coyle)  provided  Anti GERD measures may help; instruction sheet provided  Globus literature provided   Consider GI consultation pending course ( [id number])   1 tsp liquid benadryl + 1 tsp Mylanta ( liquid antacid) swallowed slowly may help soothe throat; may use h.s or TID prn  May use PPI ( otc prilosec 20 mg x 2)  before breakfast for GERD sx  Cerumen impaction removed from AD eac

## 2020-02-17 NOTE — PATIENT INSTRUCTIONS
Endoscopy performed  Pt. is a candidate for elective uvulectomy; Dr. DEMOND Martinez's card ( or Dr. OMAIRA Ann or Dr. ALO Coyle)  provided  Anti GERD measures may help; instruction sheet provided  Globus literature provided   Consider GI consultation pending course ( [phone])   1 tsp liquid benadryl + 1 tsp Mylanta ( liquid antacid) swallowed slowly may help soothe throat; may use h.s or TID prn  May use PPI ( otc prilosec 20 mg x 2)  before breakfast for GERD sx  Cerumen impaction removed from AD eac

## 2020-02-26 ENCOUNTER — PATIENT OUTREACH (OUTPATIENT)
Dept: ADMINISTRATIVE | Facility: HOSPITAL | Age: 65
End: 2020-02-26

## 2020-03-21 ENCOUNTER — PATIENT MESSAGE (OUTPATIENT)
Dept: FAMILY MEDICINE | Facility: CLINIC | Age: 65
End: 2020-03-21

## 2020-07-21 ENCOUNTER — PATIENT OUTREACH (OUTPATIENT)
Dept: ADMINISTRATIVE | Facility: HOSPITAL | Age: 65
End: 2020-07-21

## 2020-07-21 NOTE — LETTER
AUTHORIZATION FOR RELEASE OF   CONFIDENTIAL INFORMATION    Dear [facility],    We are seeing Ron Christina, date of birth 1955, in the clinic at [facility]. Selvin Rae MD is the patient's PCP. Ron Christina has an outstanding lab/procedure at the time we reviewed his chart. In order to help keep his health information updated, he has authorized us to request the following medical record(s):        (  )  MAMMOGRAM                                      ( x )  COLONOSCOPY 2010-current      (  )  PAP SMEAR                                          (  )  OUTSIDE LAB RESULTS     (  )  DEXA SCAN                                          (  )  EYE EXAM            (  )  FOOT EXAM                                          (  )  ENTIRE RECORD     (  )  OUTSIDE IMMUNIZATIONS                 (  )  _______________         Please fax records to [facility], Oliver B Mollere, MD,  [phone].     If you have any questions, please contact Aylin Shaw LPN [facility] at   [phone].     Patient Name: Ron Christina  : 1955  Patient Phone #: [phone]     Clinic address: [address] [place] [address] [place] [address]

## 2020-07-21 NOTE — PROGRESS NOTES
Overdue Colorectal Screening - Left message for patient to call our office.    A request was sent today for patient's colonoscopy record possibly at [facility]. No colonoscopy at [facility].    No HIV test done

## 2020-10-26 ENCOUNTER — LAB VISIT (OUTPATIENT)
Dept: LAB | Facility: HOSPITAL | Age: 65
End: 2020-10-26
Attending: INTERNAL MEDICINE
Payer: COMMERCIAL

## 2020-10-26 DIAGNOSIS — Z12.11 COLON CANCER SCREENING: ICD-10-CM

## 2020-10-26 PROCEDURE — 82274 ASSAY TEST FOR BLOOD FECAL: CPT

## 2020-10-29 LAB — HEMOCCULT STL QL IA: NEGATIVE

## 2020-10-30 NOTE — PROGRESS NOTES
Your stool results are normal.  Please continue your current medications and doses.  Please feel free to contact me with any questions or concerns.    Sincerely,  Selvin Rae  [email or web address]

## 2021-02-02 ENCOUNTER — TELEPHONE (OUTPATIENT)
Dept: FAMILY MEDICINE | Facility: CLINIC | Age: 66
End: 2021-02-02

## 2021-02-12 ENCOUNTER — PATIENT MESSAGE (OUTPATIENT)
Dept: FAMILY MEDICINE | Facility: CLINIC | Age: 66
End: 2021-02-12

## 2021-03-19 ENCOUNTER — OFFICE VISIT (OUTPATIENT)
Dept: FAMILY MEDICINE | Facility: CLINIC | Age: 66
End: 2021-03-19
Payer: COMMERCIAL

## 2021-03-19 VITALS
SYSTOLIC BLOOD PRESSURE: 130 MMHG | DIASTOLIC BLOOD PRESSURE: 90 MMHG | TEMPERATURE: 98 F | WEIGHT: 243.75 LBS | HEIGHT: 72 IN | OXYGEN SATURATION: 96 % | HEART RATE: 94 BPM | BODY MASS INDEX: 33.01 KG/M2

## 2021-03-19 DIAGNOSIS — R03.0 ELEVATED BLOOD PRESSURE READING WITHOUT DIAGNOSIS OF HYPERTENSION: Chronic | ICD-10-CM

## 2021-03-19 DIAGNOSIS — E78.5 DYSLIPIDEMIA: Chronic | ICD-10-CM

## 2021-03-19 DIAGNOSIS — Z00.00 ROUTINE MEDICAL EXAM: Primary | ICD-10-CM

## 2021-03-19 DIAGNOSIS — E66.9 OBESITY (BMI 30.0-34.9): Chronic | ICD-10-CM

## 2021-03-19 DIAGNOSIS — Z12.5 SCREENING PSA (PROSTATE SPECIFIC ANTIGEN): ICD-10-CM

## 2021-03-19 PROCEDURE — 99999 PR PBB SHADOW E&M-EST. PATIENT-LVL III: CPT | Mod: PBBFAC,,, | Performed by: INTERNAL MEDICINE

## 2021-03-19 PROCEDURE — 1101F PT FALLS ASSESS-DOCD LE1/YR: CPT | Mod: CPTII,S$GLB,, | Performed by: INTERNAL MEDICINE

## 2021-03-19 PROCEDURE — 1126F PR PAIN SEVERITY QUANTIFIED, NO PAIN PRESENT: ICD-10-PCS | Mod: S$GLB,,, | Performed by: INTERNAL MEDICINE

## 2021-03-19 PROCEDURE — 99397 PER PM REEVAL EST PAT 65+ YR: CPT | Mod: S$GLB,,, | Performed by: INTERNAL MEDICINE

## 2021-03-19 PROCEDURE — 1126F AMNT PAIN NOTED NONE PRSNT: CPT | Mod: S$GLB,,, | Performed by: INTERNAL MEDICINE

## 2021-03-19 PROCEDURE — 1101F PR PT FALLS ASSESS DOC 0-1 FALLS W/OUT INJ PAST YR: ICD-10-PCS | Mod: CPTII,S$GLB,, | Performed by: INTERNAL MEDICINE

## 2021-03-19 PROCEDURE — 3008F BODY MASS INDEX DOCD: CPT | Mod: CPTII,S$GLB,, | Performed by: INTERNAL MEDICINE

## 2021-03-19 PROCEDURE — 3288F PR FALLS RISK ASSESSMENT DOCUMENTED: ICD-10-PCS | Mod: CPTII,S$GLB,, | Performed by: INTERNAL MEDICINE

## 2021-03-19 PROCEDURE — 99397 PR PREVENTIVE VISIT,EST,65 & OVER: ICD-10-PCS | Mod: S$GLB,,, | Performed by: INTERNAL MEDICINE

## 2021-03-19 PROCEDURE — 99999 PR PBB SHADOW E&M-EST. PATIENT-LVL III: ICD-10-PCS | Mod: PBBFAC,,, | Performed by: INTERNAL MEDICINE

## 2021-03-19 PROCEDURE — 3288F FALL RISK ASSESSMENT DOCD: CPT | Mod: CPTII,S$GLB,, | Performed by: INTERNAL MEDICINE

## 2021-03-19 PROCEDURE — 3008F PR BODY MASS INDEX (BMI) DOCUMENTED: ICD-10-PCS | Mod: CPTII,S$GLB,, | Performed by: INTERNAL MEDICINE

## 2021-03-25 ENCOUNTER — PATIENT MESSAGE (OUTPATIENT)
Dept: FAMILY MEDICINE | Facility: CLINIC | Age: 66
End: 2021-03-25

## 2021-03-26 ENCOUNTER — LAB VISIT (OUTPATIENT)
Dept: LAB | Facility: HOSPITAL | Age: 66
End: 2021-03-26
Attending: INTERNAL MEDICINE
Payer: COMMERCIAL

## 2021-03-26 DIAGNOSIS — Z12.5 SCREENING PSA (PROSTATE SPECIFIC ANTIGEN): ICD-10-CM

## 2021-03-26 DIAGNOSIS — Z00.00 ROUTINE MEDICAL EXAM: ICD-10-CM

## 2021-03-26 LAB
ALBUMIN SERPL BCP-MCNC: 4.2 G/DL (ref 3.5–5.2)
ALP SERPL-CCNC: 57 U/L (ref 55–135)
ALT SERPL W/O P-5'-P-CCNC: 34 U/L (ref 10–44)
ANION GAP SERPL CALC-SCNC: 9 MMOL/L (ref 8–16)
AST SERPL-CCNC: 26 U/L (ref 10–40)
BASOPHILS # BLD AUTO: 0.03 K/UL (ref 0–0.2)
BASOPHILS NFR BLD: 0.5 % (ref 0–1.9)
BILIRUB SERPL-MCNC: 0.9 MG/DL (ref 0.1–1)
BUN SERPL-MCNC: 18 MG/DL (ref 8–23)
CALCIUM SERPL-MCNC: 9.4 MG/DL (ref 8.7–10.5)
CHLORIDE SERPL-SCNC: 104 MMOL/L (ref 95–110)
CHOLEST SERPL-MCNC: 219 MG/DL (ref 120–199)
CHOLEST/HDLC SERPL: 4.2 {RATIO} (ref 2–5)
CO2 SERPL-SCNC: 26 MMOL/L (ref 23–29)
COMPLEXED PSA SERPL-MCNC: 3 NG/ML (ref 0–4)
CREAT SERPL-MCNC: 1 MG/DL (ref 0.5–1.4)
DIFFERENTIAL METHOD: ABNORMAL
EOSINOPHIL # BLD AUTO: 0.1 K/UL (ref 0–0.5)
EOSINOPHIL NFR BLD: 2 % (ref 0–8)
ERYTHROCYTE [DISTWIDTH] IN BLOOD BY AUTOMATED COUNT: 13.2 % (ref 11.5–14.5)
EST. GFR  (AFRICAN AMERICAN): >60 ML/MIN/1.73 M^2
EST. GFR  (NON AFRICAN AMERICAN): >60 ML/MIN/1.73 M^2
GLUCOSE SERPL-MCNC: 99 MG/DL (ref 70–110)
HCT VFR BLD AUTO: 49.2 % (ref 40–54)
HDLC SERPL-MCNC: 52 MG/DL (ref 40–75)
HDLC SERPL: 23.7 % (ref 20–50)
HGB BLD-MCNC: 16.7 G/DL (ref 14–18)
IMM GRANULOCYTES # BLD AUTO: 0.02 K/UL (ref 0–0.04)
IMM GRANULOCYTES NFR BLD AUTO: 0.3 % (ref 0–0.5)
LDLC SERPL CALC-MCNC: 149.6 MG/DL (ref 63–159)
LYMPHOCYTES # BLD AUTO: 2.4 K/UL (ref 1–4.8)
LYMPHOCYTES NFR BLD: 37 % (ref 18–48)
MCH RBC QN AUTO: 32.5 PG (ref 27–31)
MCHC RBC AUTO-ENTMCNC: 33.9 G/DL (ref 32–36)
MCV RBC AUTO: 96 FL (ref 82–98)
MONOCYTES # BLD AUTO: 0.7 K/UL (ref 0.3–1)
MONOCYTES NFR BLD: 10 % (ref 4–15)
NEUTROPHILS # BLD AUTO: 3.3 K/UL (ref 1.8–7.7)
NEUTROPHILS NFR BLD: 50.2 % (ref 38–73)
NONHDLC SERPL-MCNC: 167 MG/DL
NRBC BLD-RTO: 0 /100 WBC
PLATELET # BLD AUTO: 234 K/UL (ref 150–350)
PMV BLD AUTO: 10.6 FL (ref 9.2–12.9)
POTASSIUM SERPL-SCNC: 4.4 MMOL/L (ref 3.5–5.1)
PROT SERPL-MCNC: 7.8 G/DL (ref 6–8.4)
RBC # BLD AUTO: 5.14 M/UL (ref 4.6–6.2)
SODIUM SERPL-SCNC: 139 MMOL/L (ref 136–145)
TRIGL SERPL-MCNC: 87 MG/DL (ref 30–150)
WBC # BLD AUTO: 6.49 K/UL (ref 3.9–12.7)

## 2021-03-26 PROCEDURE — 80053 COMPREHEN METABOLIC PANEL: CPT | Performed by: INTERNAL MEDICINE

## 2021-03-26 PROCEDURE — 84153 ASSAY OF PSA TOTAL: CPT | Performed by: INTERNAL MEDICINE

## 2021-03-26 PROCEDURE — 80061 LIPID PANEL: CPT | Performed by: INTERNAL MEDICINE

## 2021-03-26 PROCEDURE — 36415 COLL VENOUS BLD VENIPUNCTURE: CPT | Mod: PO | Performed by: INTERNAL MEDICINE

## 2021-03-26 PROCEDURE — 85025 COMPLETE CBC W/AUTO DIFF WBC: CPT | Performed by: INTERNAL MEDICINE

## 2021-07-07 ENCOUNTER — PATIENT MESSAGE (OUTPATIENT)
Dept: FAMILY MEDICINE | Facility: CLINIC | Age: 66
End: 2021-07-07

## 2021-07-11 ENCOUNTER — PATIENT MESSAGE (OUTPATIENT)
Dept: FAMILY MEDICINE | Facility: CLINIC | Age: 66
End: 2021-07-11

## 2021-07-11 DIAGNOSIS — R97.20 ELEVATED PSA: Primary | ICD-10-CM

## 2021-07-12 ENCOUNTER — PATIENT MESSAGE (OUTPATIENT)
Dept: FAMILY MEDICINE | Facility: CLINIC | Age: 66
End: 2021-07-12

## 2021-07-20 ENCOUNTER — LAB VISIT (OUTPATIENT)
Dept: LAB | Facility: HOSPITAL | Age: 66
End: 2021-07-20
Attending: INTERNAL MEDICINE
Payer: COMMERCIAL

## 2021-07-20 DIAGNOSIS — R97.20 ELEVATED PSA: ICD-10-CM

## 2021-07-20 LAB — COMPLEXED PSA SERPL-MCNC: 1.4 NG/ML (ref 0–4)

## 2021-07-20 PROCEDURE — 36415 COLL VENOUS BLD VENIPUNCTURE: CPT | Mod: PO | Performed by: INTERNAL MEDICINE

## 2021-07-20 PROCEDURE — 84153 ASSAY OF PSA TOTAL: CPT | Performed by: INTERNAL MEDICINE

## 2021-07-23 ENCOUNTER — PATIENT MESSAGE (OUTPATIENT)
Dept: FAMILY MEDICINE | Facility: CLINIC | Age: 66
End: 2021-07-23

## 2021-07-25 ENCOUNTER — OFFICE VISIT (OUTPATIENT)
Dept: URGENT CARE | Facility: CLINIC | Age: 66
End: 2021-07-25
Payer: COMMERCIAL

## 2021-07-25 VITALS
BODY MASS INDEX: 31.69 KG/M2 | TEMPERATURE: 100 F | SYSTOLIC BLOOD PRESSURE: 112 MMHG | WEIGHT: 234 LBS | OXYGEN SATURATION: 97 % | RESPIRATION RATE: 17 BRPM | HEART RATE: 97 BPM | HEIGHT: 72 IN | DIASTOLIC BLOOD PRESSURE: 80 MMHG

## 2021-07-25 DIAGNOSIS — R09.89 RUNNY NOSE: ICD-10-CM

## 2021-07-25 DIAGNOSIS — U07.1 COVID-19 VIRUS INFECTION: Primary | ICD-10-CM

## 2021-07-25 LAB
CTP QC/QA: YES
SARS-COV-2 RDRP RESP QL NAA+PROBE: POSITIVE

## 2021-07-25 PROCEDURE — 1126F PR PAIN SEVERITY QUANTIFIED, NO PAIN PRESENT: ICD-10-PCS | Mod: CPTII,S$GLB,, | Performed by: NURSE PRACTITIONER

## 2021-07-25 PROCEDURE — 3008F PR BODY MASS INDEX (BMI) DOCUMENTED: ICD-10-PCS | Mod: CPTII,S$GLB,, | Performed by: NURSE PRACTITIONER

## 2021-07-25 PROCEDURE — 99203 OFFICE O/P NEW LOW 30 MIN: CPT | Mod: S$GLB,,, | Performed by: NURSE PRACTITIONER

## 2021-07-25 PROCEDURE — U0002: ICD-10-PCS | Mod: QW,S$GLB,, | Performed by: NURSE PRACTITIONER

## 2021-07-25 PROCEDURE — 1126F AMNT PAIN NOTED NONE PRSNT: CPT | Mod: CPTII,S$GLB,, | Performed by: NURSE PRACTITIONER

## 2021-07-25 PROCEDURE — 99203 PR OFFICE/OUTPT VISIT, NEW, LEVL III, 30-44 MIN: ICD-10-PCS | Mod: S$GLB,,, | Performed by: NURSE PRACTITIONER

## 2021-07-25 PROCEDURE — U0002 COVID-19 LAB TEST NON-CDC: HCPCS | Mod: QW,S$GLB,, | Performed by: NURSE PRACTITIONER

## 2021-07-25 PROCEDURE — 3008F BODY MASS INDEX DOCD: CPT | Mod: CPTII,S$GLB,, | Performed by: NURSE PRACTITIONER

## 2021-07-28 ENCOUNTER — PATIENT MESSAGE (OUTPATIENT)
Dept: FAMILY MEDICINE | Facility: CLINIC | Age: 66
End: 2021-07-28

## 2021-08-02 ENCOUNTER — PATIENT MESSAGE (OUTPATIENT)
Dept: ADMINISTRATIVE | Facility: HOSPITAL | Age: 66
End: 2021-08-02

## 2021-09-23 ENCOUNTER — TELEPHONE (OUTPATIENT)
Dept: FAMILY MEDICINE | Facility: CLINIC | Age: 66
End: 2021-09-23

## 2021-09-23 DIAGNOSIS — Z12.11 ENCOUNTER FOR FIT (FECAL IMMUNOCHEMICAL TEST) SCREENING: Primary | ICD-10-CM

## 2021-10-07 ENCOUNTER — LAB VISIT (OUTPATIENT)
Dept: LAB | Facility: HOSPITAL | Age: 66
End: 2021-10-07
Attending: INTERNAL MEDICINE
Payer: COMMERCIAL

## 2021-10-07 DIAGNOSIS — Z12.11 ENCOUNTER FOR FIT (FECAL IMMUNOCHEMICAL TEST) SCREENING: ICD-10-CM

## 2021-10-07 PROCEDURE — 82274 ASSAY TEST FOR BLOOD FECAL: CPT | Performed by: INTERNAL MEDICINE

## 2021-10-16 LAB — HEMOCCULT STL QL IA: NEGATIVE

## 2022-03-18 ENCOUNTER — LAB VISIT (OUTPATIENT)
Dept: LAB | Facility: HOSPITAL | Age: 67
End: 2022-03-18
Attending: INTERNAL MEDICINE
Payer: COMMERCIAL

## 2022-03-18 DIAGNOSIS — Z12.5 SCREENING PSA (PROSTATE SPECIFIC ANTIGEN): ICD-10-CM

## 2022-03-18 DIAGNOSIS — Z00.00 ROUTINE MEDICAL EXAM: ICD-10-CM

## 2022-03-18 LAB
ALBUMIN SERPL BCP-MCNC: 4.1 G/DL (ref 3.5–5.2)
ALP SERPL-CCNC: 56 U/L (ref 55–135)
ALT SERPL W/O P-5'-P-CCNC: 30 U/L (ref 10–44)
ANION GAP SERPL CALC-SCNC: 10 MMOL/L (ref 8–16)
AST SERPL-CCNC: 23 U/L (ref 10–40)
BASOPHILS # BLD AUTO: 0.05 K/UL (ref 0–0.2)
BASOPHILS NFR BLD: 0.7 % (ref 0–1.9)
BILIRUB SERPL-MCNC: 0.9 MG/DL (ref 0.1–1)
BUN SERPL-MCNC: 18 MG/DL (ref 8–23)
CALCIUM SERPL-MCNC: 10 MG/DL (ref 8.7–10.5)
CHLORIDE SERPL-SCNC: 105 MMOL/L (ref 95–110)
CHOLEST SERPL-MCNC: 216 MG/DL (ref 120–199)
CHOLEST/HDLC SERPL: 4.5 {RATIO} (ref 2–5)
CO2 SERPL-SCNC: 24 MMOL/L (ref 23–29)
COMPLEXED PSA SERPL-MCNC: 1.3 NG/ML (ref 0–4)
CREAT SERPL-MCNC: 0.9 MG/DL (ref 0.5–1.4)
DIFFERENTIAL METHOD: ABNORMAL
EOSINOPHIL # BLD AUTO: 0.3 K/UL (ref 0–0.5)
EOSINOPHIL NFR BLD: 3.5 % (ref 0–8)
ERYTHROCYTE [DISTWIDTH] IN BLOOD BY AUTOMATED COUNT: 12.8 % (ref 11.5–14.5)
EST. GFR  (AFRICAN AMERICAN): >60 ML/MIN/1.73 M^2
EST. GFR  (NON AFRICAN AMERICAN): >60 ML/MIN/1.73 M^2
GLUCOSE SERPL-MCNC: 93 MG/DL (ref 70–110)
HCT VFR BLD AUTO: 49.8 % (ref 40–54)
HDLC SERPL-MCNC: 48 MG/DL (ref 40–75)
HDLC SERPL: 22.2 % (ref 20–50)
HGB BLD-MCNC: 16.7 G/DL (ref 14–18)
IMM GRANULOCYTES # BLD AUTO: 0.03 K/UL (ref 0–0.04)
IMM GRANULOCYTES NFR BLD AUTO: 0.4 % (ref 0–0.5)
LDLC SERPL CALC-MCNC: 151 MG/DL (ref 63–159)
LYMPHOCYTES # BLD AUTO: 2.8 K/UL (ref 1–4.8)
LYMPHOCYTES NFR BLD: 37.5 % (ref 18–48)
MCH RBC QN AUTO: 32.9 PG (ref 27–31)
MCHC RBC AUTO-ENTMCNC: 33.5 G/DL (ref 32–36)
MCV RBC AUTO: 98 FL (ref 82–98)
MONOCYTES # BLD AUTO: 0.8 K/UL (ref 0.3–1)
MONOCYTES NFR BLD: 10.3 % (ref 4–15)
NEUTROPHILS # BLD AUTO: 3.5 K/UL (ref 1.8–7.7)
NEUTROPHILS NFR BLD: 47.6 % (ref 38–73)
NONHDLC SERPL-MCNC: 168 MG/DL
NRBC BLD-RTO: 0 /100 WBC
PLATELET # BLD AUTO: 219 K/UL (ref 150–450)
PMV BLD AUTO: 10.6 FL (ref 9.2–12.9)
POTASSIUM SERPL-SCNC: 4.2 MMOL/L (ref 3.5–5.1)
PROT SERPL-MCNC: 7.8 G/DL (ref 6–8.4)
RBC # BLD AUTO: 5.08 M/UL (ref 4.6–6.2)
SODIUM SERPL-SCNC: 139 MMOL/L (ref 136–145)
TRIGL SERPL-MCNC: 85 MG/DL (ref 30–150)
WBC # BLD AUTO: 7.39 K/UL (ref 3.9–12.7)

## 2022-03-18 PROCEDURE — 80053 COMPREHEN METABOLIC PANEL: CPT | Mod: HCNC | Performed by: INTERNAL MEDICINE

## 2022-03-18 PROCEDURE — 85025 COMPLETE CBC W/AUTO DIFF WBC: CPT | Mod: HCNC | Performed by: INTERNAL MEDICINE

## 2022-03-18 PROCEDURE — 36415 COLL VENOUS BLD VENIPUNCTURE: CPT | Mod: HCNC,PO | Performed by: INTERNAL MEDICINE

## 2022-03-18 PROCEDURE — 80061 LIPID PANEL: CPT | Mod: HCNC | Performed by: INTERNAL MEDICINE

## 2022-03-18 PROCEDURE — 84153 ASSAY OF PSA TOTAL: CPT | Mod: HCNC | Performed by: INTERNAL MEDICINE

## 2022-03-21 ENCOUNTER — OFFICE VISIT (OUTPATIENT)
Dept: FAMILY MEDICINE | Facility: CLINIC | Age: 67
End: 2022-03-21
Payer: COMMERCIAL

## 2022-03-21 VITALS
BODY MASS INDEX: 32.8 KG/M2 | HEART RATE: 76 BPM | SYSTOLIC BLOOD PRESSURE: 116 MMHG | DIASTOLIC BLOOD PRESSURE: 76 MMHG | TEMPERATURE: 98 F | OXYGEN SATURATION: 95 % | WEIGHT: 242.19 LBS | HEIGHT: 72 IN

## 2022-03-21 DIAGNOSIS — Z00.00 ROUTINE MEDICAL EXAM: Primary | ICD-10-CM

## 2022-03-21 DIAGNOSIS — E78.5 DYSLIPIDEMIA: Chronic | ICD-10-CM

## 2022-03-21 DIAGNOSIS — E66.9 OBESITY (BMI 30.0-34.9): Chronic | ICD-10-CM

## 2022-03-21 DIAGNOSIS — R03.0 ELEVATED BLOOD PRESSURE READING WITHOUT DIAGNOSIS OF HYPERTENSION: Chronic | ICD-10-CM

## 2022-03-21 PROCEDURE — 1159F PR MEDICATION LIST DOCUMENTED IN MEDICAL RECORD: ICD-10-PCS | Mod: HCNC,CPTII,S$GLB, | Performed by: INTERNAL MEDICINE

## 2022-03-21 PROCEDURE — 1159F MED LIST DOCD IN RCRD: CPT | Mod: HCNC,CPTII,S$GLB, | Performed by: INTERNAL MEDICINE

## 2022-03-21 PROCEDURE — 99397 PR PREVENTIVE VISIT,EST,65 & OVER: ICD-10-PCS | Mod: HCNC,S$GLB,, | Performed by: INTERNAL MEDICINE

## 2022-03-21 PROCEDURE — 3078F PR MOST RECENT DIASTOLIC BLOOD PRESSURE < 80 MM HG: ICD-10-PCS | Mod: HCNC,CPTII,S$GLB, | Performed by: INTERNAL MEDICINE

## 2022-03-21 PROCEDURE — 99397 PER PM REEVAL EST PAT 65+ YR: CPT | Mod: HCNC,S$GLB,, | Performed by: INTERNAL MEDICINE

## 2022-03-21 PROCEDURE — 3008F PR BODY MASS INDEX (BMI) DOCUMENTED: ICD-10-PCS | Mod: HCNC,CPTII,S$GLB, | Performed by: INTERNAL MEDICINE

## 2022-03-21 PROCEDURE — 3074F PR MOST RECENT SYSTOLIC BLOOD PRESSURE < 130 MM HG: ICD-10-PCS | Mod: HCNC,CPTII,S$GLB, | Performed by: INTERNAL MEDICINE

## 2022-03-21 PROCEDURE — 3008F BODY MASS INDEX DOCD: CPT | Mod: HCNC,CPTII,S$GLB, | Performed by: INTERNAL MEDICINE

## 2022-03-21 PROCEDURE — 1101F PR PT FALLS ASSESS DOC 0-1 FALLS W/OUT INJ PAST YR: ICD-10-PCS | Mod: HCNC,CPTII,S$GLB, | Performed by: INTERNAL MEDICINE

## 2022-03-21 PROCEDURE — 3074F SYST BP LT 130 MM HG: CPT | Mod: HCNC,CPTII,S$GLB, | Performed by: INTERNAL MEDICINE

## 2022-03-21 PROCEDURE — 1160F RVW MEDS BY RX/DR IN RCRD: CPT | Mod: HCNC,CPTII,S$GLB, | Performed by: INTERNAL MEDICINE

## 2022-03-21 PROCEDURE — 1126F PR PAIN SEVERITY QUANTIFIED, NO PAIN PRESENT: ICD-10-PCS | Mod: HCNC,CPTII,S$GLB, | Performed by: INTERNAL MEDICINE

## 2022-03-21 PROCEDURE — 1126F AMNT PAIN NOTED NONE PRSNT: CPT | Mod: HCNC,CPTII,S$GLB, | Performed by: INTERNAL MEDICINE

## 2022-03-21 PROCEDURE — 3078F DIAST BP <80 MM HG: CPT | Mod: HCNC,CPTII,S$GLB, | Performed by: INTERNAL MEDICINE

## 2022-03-21 PROCEDURE — 1160F PR REVIEW ALL MEDS BY PRESCRIBER/CLIN PHARMACIST DOCUMENTED: ICD-10-PCS | Mod: HCNC,CPTII,S$GLB, | Performed by: INTERNAL MEDICINE

## 2022-03-21 PROCEDURE — 99999 PR PBB SHADOW E&M-EST. PATIENT-LVL III: ICD-10-PCS | Mod: PBBFAC,HCNC,, | Performed by: INTERNAL MEDICINE

## 2022-03-21 PROCEDURE — 3288F FALL RISK ASSESSMENT DOCD: CPT | Mod: HCNC,CPTII,S$GLB, | Performed by: INTERNAL MEDICINE

## 2022-03-21 PROCEDURE — 1101F PT FALLS ASSESS-DOCD LE1/YR: CPT | Mod: HCNC,CPTII,S$GLB, | Performed by: INTERNAL MEDICINE

## 2022-03-21 PROCEDURE — 99999 PR PBB SHADOW E&M-EST. PATIENT-LVL III: CPT | Mod: PBBFAC,HCNC,, | Performed by: INTERNAL MEDICINE

## 2022-03-21 PROCEDURE — 3288F PR FALLS RISK ASSESSMENT DOCUMENTED: ICD-10-PCS | Mod: HCNC,CPTII,S$GLB, | Performed by: INTERNAL MEDICINE

## 2022-03-21 NOTE — PROGRESS NOTES
Assessment & Plan  Problem List Items Addressed This Visit        Cardiac/Vascular    Elevated blood pressure reading without diagnosis of hypertension (Chronic)    Overview     Good home BP log           Current Assessment & Plan     Excellent reading today.  Keep up the great work!            Dyslipidemia (Chronic)    Current Assessment & Plan     He has been counseled on his CVD risk and declines statin therapy.  He is aware of the recommendation for statin therapy to prevent MI/CVA and declines despite counseling.  All of his questions at the time of this OV regarding this topic have been answered.     Discussed red rice yeast if is is willing to take a supplement.               Endocrine    Obesity (BMI 30.0-34.9) (Chronic)    Current Assessment & Plan     The patient's BMI has been recorded in the chart. The patient has been provided educational materials regarding the benefits of attaining and maintaining a normal weight. We will continue to address and follow this issue during follow up visits.               Other Visit Diagnoses     Routine medical exam    -  Primary  -   Discussed healthy diet, regular exercise, necessary labs, age appropriate cancer screening, and routine vaccinations.       Relevant Orders    CBC Auto Differential    Comprehensive Metabolic Panel    Lipid Panel    PSA, Screening            Health Maintenance reviewed, continues to declined all vaccinations.     Follow-up: Follow up in about 1 year (around 3/21/2023) for Routine Physical.  ______________________________________________________________________    Chief Complaint  Chief Complaint   Patient presents with    Annual Exam     Results as well       HPI  Ron Christina is a 66 y.o. male with medical diagnoses as listed in the medical history and problem list that presents for routine physical.  Pt is known to me with their last appointment 03/2021.  He had labs prior to this OV that showed generally reassuring CBC CMP lipids  PSA.      No acute complaints. Feels he recovered completely after COVID last July.  Had anosmia and change in taste without fully lost but this is resolved.      The 10-year ASCVD risk score (Ervinmitchell VALADEZ Jr., et al., 2013) is: 13.4%    Values used to calculate the score:      Age: 66 years      Sex: Male      Is Non- : No      Diabetic: No      Tobacco smoker: No      Systolic Blood Pressure: 116 mmHg      Is BP treated: No      HDL Cholesterol: 48 mg/dL      Total Cholesterol: 216 mg/dL    PAST MEDICAL HISTORY:  Past Medical History:   Diagnosis Date    Allergy     Deep vein thrombosis        PAST SURGICAL HISTORY:  Past Surgical History:   Procedure Laterality Date    HERNIA REPAIR         SOCIAL HISTORY:  Social History     Socioeconomic History    Marital status:    Tobacco Use    Smoking status: Never Smoker    Smokeless tobacco: Never Used   Substance and Sexual Activity    Alcohol use: Yes    Drug use: No       FAMILY HISTORY:  Family History   Problem Relation Age of Onset    Alcohol abuse Mother     Asthma Father        ALLERGIES AND MEDICATIONS: updated and reviewed.  Review of patient's allergies indicates:   Allergen Reactions    Penicillins Other (See Comments)     No current outpatient medications on file.     No current facility-administered medications for this visit.         ROS  Review of Systems   Constitutional: Negative for chills, fever and unexpected weight change.   HENT: Negative for congestion, dental problem, ear pain, hearing loss, rhinorrhea, sore throat and trouble swallowing.    Eyes: Negative for discharge, redness and visual disturbance.   Respiratory: Negative for cough, chest tightness, shortness of breath and wheezing.    Cardiovascular: Negative for chest pain, palpitations and leg swelling.   Gastrointestinal: Negative for abdominal pain, constipation, diarrhea, nausea and vomiting.   Endocrine: Negative for polydipsia, polyphagia and  polyuria.   Genitourinary: Negative for decreased urine volume, dysuria and hematuria.   Musculoskeletal: Negative for arthralgias and myalgias.   Skin: Negative for color change and rash.   Neurological: Negative for dizziness, weakness, light-headedness and headaches.   Psychiatric/Behavioral: Negative for decreased concentration, dysphoric mood, sleep disturbance and suicidal ideas.           Physical Exam  Vitals:    03/21/22 0727   BP: 116/76   BP Location: Left arm   Patient Position: Sitting   BP Method: Large (Manual)   Pulse: 76   Temp: 98.1 °F (36.7 °C)   TempSrc: Oral   SpO2: 95%   Weight: 109.8 kg (242 lb 2.8 oz)   Height: 6' (1.829 m)    Body mass index is 32.84 kg/m².  Weight: 109.8 kg (242 lb 2.8 oz)   Height: 6' (182.9 cm)   Physical Exam  Constitutional:       General: He is not in acute distress.     Appearance: He is well-developed.   HENT:      Head: Normocephalic and atraumatic.   Eyes:      General: Lids are normal. No scleral icterus.     Conjunctiva/sclera: Conjunctivae normal.      Pupils: Pupils are equal, round, and reactive to light.   Neck:      Thyroid: No thyromegaly.      Vascular: No carotid bruit or JVD.   Cardiovascular:      Rate and Rhythm: Normal rate and regular rhythm.      Heart sounds: Normal heart sounds. No murmur heard.    No friction rub. No S3 or S4 sounds.   Pulmonary:      Effort: Pulmonary effort is normal.      Breath sounds: Normal breath sounds. No wheezing, rhonchi or rales.   Abdominal:      General: Bowel sounds are normal. There is no distension.      Palpations: Abdomen is soft.      Tenderness: There is no abdominal tenderness.   Musculoskeletal:         General: No tenderness.      Cervical back: Full passive range of motion without pain and neck supple.      Right lower leg: No edema.      Left lower leg: No edema.   Skin:     General: Skin is warm and dry.      Findings: No rash.   Neurological:      Mental Status: He is alert and oriented to person,  place, and time.   Psychiatric:         Speech: Speech normal.         Behavior: Behavior normal.         Thought Content: Thought content normal.             Health Maintenance       Date Due Completion Date    COVID-19 Vaccine (1) Never done ---    TETANUS VACCINE Never done ---    Shingles Vaccine (1 of 2) Never done ---    Pneumococcal Vaccines (Age 65+) (1 of 1 - PPSV23) Never done ---    Influenza Vaccine (1) 09/01/2021 12/17/2018    Colorectal Cancer Screening 10/15/2022 10/15/2021    PROSTATE-SPECIFIC ANTIGEN 03/18/2023 3/18/2022    Lipid Panel 03/18/2027 3/18/2022

## 2022-03-21 NOTE — ASSESSMENT & PLAN NOTE
He has been counseled on his CVD risk and declines statin therapy.  He is aware of the recommendation for statin therapy to prevent MI/CVA and declines despite counseling.  All of his questions at the time of this OV regarding this topic have been answered.     Discussed red rice yeast if is is willing to take a supplement.

## 2022-09-06 ENCOUNTER — OFFICE VISIT (OUTPATIENT)
Dept: FAMILY MEDICINE | Facility: CLINIC | Age: 67
End: 2022-09-06
Payer: COMMERCIAL

## 2022-09-06 VITALS
TEMPERATURE: 99 F | WEIGHT: 247.44 LBS | DIASTOLIC BLOOD PRESSURE: 86 MMHG | BODY MASS INDEX: 33.56 KG/M2 | OXYGEN SATURATION: 96 % | SYSTOLIC BLOOD PRESSURE: 120 MMHG | HEART RATE: 72 BPM

## 2022-09-06 DIAGNOSIS — R03.0 ELEVATED BLOOD PRESSURE READING WITHOUT DIAGNOSIS OF HYPERTENSION: Chronic | ICD-10-CM

## 2022-09-06 DIAGNOSIS — E78.5 DYSLIPIDEMIA: Chronic | ICD-10-CM

## 2022-09-06 DIAGNOSIS — E66.9 OBESITY (BMI 30.0-34.9): Chronic | ICD-10-CM

## 2022-09-06 DIAGNOSIS — S93.401A SPRAIN OF RIGHT ANKLE, UNSPECIFIED LIGAMENT, INITIAL ENCOUNTER: Primary | ICD-10-CM

## 2022-09-06 PROCEDURE — 3008F PR BODY MASS INDEX (BMI) DOCUMENTED: ICD-10-PCS | Mod: HCNC,CPTII,S$GLB, | Performed by: NURSE PRACTITIONER

## 2022-09-06 PROCEDURE — 3079F PR MOST RECENT DIASTOLIC BLOOD PRESSURE 80-89 MM HG: ICD-10-PCS | Mod: HCNC,CPTII,S$GLB, | Performed by: NURSE PRACTITIONER

## 2022-09-06 PROCEDURE — 1159F MED LIST DOCD IN RCRD: CPT | Mod: HCNC,CPTII,S$GLB, | Performed by: NURSE PRACTITIONER

## 2022-09-06 PROCEDURE — 99499 UNLISTED E&M SERVICE: CPT | Mod: S$PBB,,, | Performed by: NURSE PRACTITIONER

## 2022-09-06 PROCEDURE — 1100F PR PT FALLS ASSESS DOC 2+ FALLS/FALL W/INJURY/YR: ICD-10-PCS | Mod: HCNC,CPTII,S$GLB, | Performed by: NURSE PRACTITIONER

## 2022-09-06 PROCEDURE — 99213 OFFICE O/P EST LOW 20 MIN: CPT | Mod: HCNC,S$GLB,, | Performed by: NURSE PRACTITIONER

## 2022-09-06 PROCEDURE — 3288F FALL RISK ASSESSMENT DOCD: CPT | Mod: HCNC,CPTII,S$GLB, | Performed by: NURSE PRACTITIONER

## 2022-09-06 PROCEDURE — 1100F PTFALLS ASSESS-DOCD GE2>/YR: CPT | Mod: HCNC,CPTII,S$GLB, | Performed by: NURSE PRACTITIONER

## 2022-09-06 PROCEDURE — 99213 PR OFFICE/OUTPT VISIT, EST, LEVL III, 20-29 MIN: ICD-10-PCS | Mod: HCNC,S$GLB,, | Performed by: NURSE PRACTITIONER

## 2022-09-06 PROCEDURE — 3074F SYST BP LT 130 MM HG: CPT | Mod: HCNC,CPTII,S$GLB, | Performed by: NURSE PRACTITIONER

## 2022-09-06 PROCEDURE — 3288F PR FALLS RISK ASSESSMENT DOCUMENTED: ICD-10-PCS | Mod: HCNC,CPTII,S$GLB, | Performed by: NURSE PRACTITIONER

## 2022-09-06 PROCEDURE — 1159F PR MEDICATION LIST DOCUMENTED IN MEDICAL RECORD: ICD-10-PCS | Mod: HCNC,CPTII,S$GLB, | Performed by: NURSE PRACTITIONER

## 2022-09-06 PROCEDURE — 1125F PR PAIN SEVERITY QUANTIFIED, PAIN PRESENT: ICD-10-PCS | Mod: HCNC,CPTII,S$GLB, | Performed by: NURSE PRACTITIONER

## 2022-09-06 PROCEDURE — 1125F AMNT PAIN NOTED PAIN PRSNT: CPT | Mod: HCNC,CPTII,S$GLB, | Performed by: NURSE PRACTITIONER

## 2022-09-06 PROCEDURE — 3079F DIAST BP 80-89 MM HG: CPT | Mod: HCNC,CPTII,S$GLB, | Performed by: NURSE PRACTITIONER

## 2022-09-06 PROCEDURE — 3074F PR MOST RECENT SYSTOLIC BLOOD PRESSURE < 130 MM HG: ICD-10-PCS | Mod: HCNC,CPTII,S$GLB, | Performed by: NURSE PRACTITIONER

## 2022-09-06 PROCEDURE — 3008F BODY MASS INDEX DOCD: CPT | Mod: HCNC,CPTII,S$GLB, | Performed by: NURSE PRACTITIONER

## 2022-09-06 PROCEDURE — 99999 PR PBB SHADOW E&M-EST. PATIENT-LVL III: ICD-10-PCS | Mod: PBBFAC,HCNC,, | Performed by: NURSE PRACTITIONER

## 2022-09-06 PROCEDURE — 99499 RISK ADDL DX/OHS AUDIT: ICD-10-PCS | Mod: S$PBB,,, | Performed by: NURSE PRACTITIONER

## 2022-09-06 PROCEDURE — 99999 PR PBB SHADOW E&M-EST. PATIENT-LVL III: CPT | Mod: PBBFAC,HCNC,, | Performed by: NURSE PRACTITIONER

## 2022-09-06 NOTE — PROGRESS NOTES
HPI     Chief Complaint:  Chief Complaint   Patient presents with    Foot Injury    Foot Swelling         Ron Christina is a 67 y.o. male with multiple medical diagnoses as listed in the medical history and problem list that presents for right ankle pain.  Pt is new to me but is known to this clinic with his last appointment being 3/21/2022.      Ankle Injury   Incident onset: 3 wks ago. The incident occurred at the pool. The injury mechanism was an inversion injury. The pain is present in the right ankle. The quality of the pain is described as aching. The pain is at a severity of 1/10. The pain has been Improving since onset. Pertinent negatives include no inability to bear weight, loss of motion, loss of sensation, muscle weakness, numbness or tingling. He reports no foreign bodies present. The symptoms are aggravated by palpation. He has tried acetaminophen, ice and rest for the symptoms. The treatment provided moderate relief.       he is compliant with medications daily without any adverse side effects.    Assessment & Plan     Problem List Items Addressed This Visit          Cardiac/Vascular    Dyslipidemia (Chronic)    discussed ways to lower triglycerides such as cutting simple sugars out of diet (white breads, candies, cookies, cakes, etc.) and reducing/eliminating intake of highly processed trans fatty acids.   Exercise 30 minutes a day for 4-5 days a week.   Eat more fiber.      Elevated blood pressure reading without diagnosis of hypertension (Chronic)    /86 (BP Location: Right arm, Patient Position: Sitting, BP Method: Large (Manual))   Pulse 72   Temp 98.7 °F (37.1 °C) (Oral)   Wt 112.2 kg (247 lb 7.5 oz)   SpO2 96%   BMI 33.56 kg/m²   -continue current medication regimen  -DASH diet, regular cardiovascular exercises, portion control  - ?weight loss  -f/u with BP logs in 2 weeks if BP is not consistently <140/90      Overview     Good home BP log            Endocrine    Obesity (BMI  30.0-34.9) (Chronic)  We discussed weight issues and safe, effective ways of losing pounds, includin) diet:  low carbohydrate, low fat diet, stay away from fast food, fried and processed food, use whole grain, lot of fruits and vegetables, use healthy fat such as avocado, nuts and olive oil in reasonable quantity, stay away from sodas. Regular meals with lean proteins.  2) physical activity: ideally 150 min a week, with cardiovascular and resistance activity.  Patient was encouraged to set realistic attainable goals for weight loss, and we will follow up periodically.       Other Visit Diagnoses       Sprain of right ankle, unspecified ligament, initial encounter    -  Primary    Pt reports significant improvement in pain level. Mild swelling to area surrounding anterior talofibular ligament. Mild TTP to area. No erythema. No laxity. Stable gait. Pt denies pain with movement. Full ROM.     Offered to obtain imaging, pt declined.     Judicious use of tylenol prn.     Pt advised to purchase ankle brace/wrap, stated he will do so.     Advised RICE therapy.     Fall precautions. Specifically around pool/wet surfaces.     Discussed DDx, condition, and treatment.   Education sent to patient portal/included in after visit summary.  ED precautions given.   Notify provider if symptoms do not resolve or increase in severity.   Patient verbalizes understanding and agrees with plan of care.              --------------------------------------------      Health Maintenance:  Health Maintenance         Date Due Completion Date    Influenza Vaccine (1) 2022    TETANUS VACCINE 2023 (Originally 1973) ---    Shingles Vaccine (1 of 2) 2023 (Originally 2004) ---    COVID-19 Vaccine (1) 2023 (Originally 1956) ---    Pneumococcal Vaccines (Age 65+) (1 - PCV) 2023 (Originally 2019) ---    Colorectal Cancer Screening 10/15/2022 10/15/2021    PROSTATE-SPECIFIC ANTIGEN  03/18/2023 3/18/2022    Lipid Panel 03/18/2027 3/18/2022            Health maintenance reviewed.      Follow Up:  Follow up in about 2 weeks (around 9/20/2022), or if symptoms worsen or fail to improve.    Exam     Review of Systems:  (as noted above)  Review of Systems   Constitutional:  Negative for chills, fever and unexpected weight change.   HENT:  Negative for sore throat and trouble swallowing.    Eyes:  Negative for visual disturbance.   Respiratory:  Negative for cough, chest tightness, shortness of breath and wheezing.    Cardiovascular:  Negative for chest pain and palpitations.   Gastrointestinal:  Negative for anal bleeding and blood in stool.   Endocrine: Negative for polydipsia and polyphagia.   Genitourinary:  Negative for decreased urine volume and hematuria.   Skin:  Negative for rash and wound.   Neurological:  Negative for tingling, seizures, speech difficulty and numbness.   Psychiatric/Behavioral:  Negative for confusion, decreased concentration and suicidal ideas.      Physical Exam:   Physical Exam  Constitutional:       General: He is not in acute distress.     Appearance: He is not ill-appearing or diaphoretic.   HENT:      Head: Normocephalic and atraumatic.   Eyes:      General: No scleral icterus.     Pupils: Pupils are equal, round, and reactive to light.   Neck:      Vascular: No carotid bruit.   Cardiovascular:      Rate and Rhythm: Normal rate and regular rhythm.      Pulses: Normal pulses.      Heart sounds: No murmur heard.    No friction rub. No gallop.   Pulmonary:      Effort: No respiratory distress.      Breath sounds: No wheezing.   Chest:      Chest wall: No tenderness.   Musculoskeletal:         General: Swelling, tenderness and signs of injury present.      Cervical back: No rigidity or tenderness.        Feet:    Feet:      Comments: Area with mild TTP and swelling.   Lymphadenopathy:      Cervical: No cervical adenopathy.   Skin:     Capillary Refill: Capillary refill  takes 2 to 3 seconds.      Findings: No rash.   Neurological:      Mental Status: He is alert.      Cranial Nerves: No cranial nerve deficit.      Sensory: No sensory deficit.      Motor: No weakness.     Vitals:    09/06/22 1639   BP: 120/86   BP Location: Right arm   Patient Position: Sitting   BP Method: Large (Manual)   Pulse: 72   Temp: 98.7 °F (37.1 °C)   TempSrc: Oral   SpO2: 96%   Weight: 112.2 kg (247 lb 7.5 oz)      Body mass index is 33.56 kg/m².        History     Past Medical History:  Past Medical History:   Diagnosis Date    Allergy     COVID-19 07/2021    presumed Delta strain    Deep vein thrombosis        Past Surgical History:  Past Surgical History:   Procedure Laterality Date    HERNIA REPAIR         Social History:  Social History     Socioeconomic History    Marital status:    Tobacco Use    Smoking status: Never    Smokeless tobacco: Never   Substance and Sexual Activity    Alcohol use: Yes    Drug use: No       Family History:  Family History   Problem Relation Age of Onset    Alcohol abuse Mother     Asthma Father        Allergies and Medications: (updated and reviewed)  Review of patient's allergies indicates:   Allergen Reactions    Penicillins Other (See Comments)     No current outpatient medications on file.     No current facility-administered medications for this visit.       Patient Care Team:  Selvin Rae MD as PCP - General (Internal Medicine)  Aylin Shaw LPN as Licensed Practical Nurse      The patient expressed understanding and no barriers to adherence were identified.      - The patient indicates understanding of these issues and agrees with the plan. Brief care plan is updated and reviewed with the patient as applicable.      - The patient is given an After Visit Summary that lists all medications with directions, allergies, education, orders placed during this encounter and follow-up instructions.      - I have reviewed the patient's medical  information including past medical, family, and social history sections including the medications and allergies.      - We discussed the patient's current medications.     This note was created by combination of typed  and MModal dictation.  Transcription errors may be present.  If there are any questions, please contact me.       Ge Aguila NP

## 2022-09-06 NOTE — PATIENT INSTRUCTIONS
//////////PHONE NUMBERS//////////    Bariatrics---[phone]  Breast Surgery---[phone]  Case Management---[phone]  Colonoscopy---[phone]  Imaging, Xray, CT, MRI, Ultrasound---[phone]  Infectious Disease---[phone]  Interventional Radiology---[phone]  Medical Records---[phone]  [facility]---[phone]  DME---[phone]  Optometry/Ophthalmology---[phone]  [facility]---[phone]  Physical Therapy---[phone]  Psychiatry---[phone]  Plastic Surgery---[phone]  Sleep Study---[phone]  Smoking Cessation---[phone]  Vaccine Hotline---[phone]  Wound Care---[phone]  COVID Vaccine center---[phone]  Referral Desk---[phone]    /////////////////////////////////////////////////    Patient Education       Obesity Discharge Instructions, Adult   About this topic   Obesity is a health problem where your weight is higher than it should be. Doctors use a method called body mass index or BMI to measure whether you are at a healthy weight for your height. The doctor uses your weight and your height to determine your BMI. A high BMI can be an indicator of high body fat. Obesity is different from being overweight. Being overweight means your BMI is 25 or higher. Being obese means your BMI is 30 or higher. If your BMI is 40 or higher, you are considered severely or morbidly obese. Being obese may lead to many health problems. It may make it hard for you to breathe and move easily. It may raise your risk for illnesses like high blood sugar and heart disease.  What care is needed at home?   Ask your doctor what you need to do when you go home. Make sure you understand everything the doctor says. This way you will know what you need to do.  Change your diet. Lower the calories in your diet. Ask your dietitian to help you set an eating plan that is right for you.  Get enough exercise. Talk to your doctor about the right amount of exercise for you.  Do not  smoke.  Limit the amount of beer, wine, and mixed drinks (alcohol) you drink.  Keep a record of your weight. Write down what you eat and drink each day. This may help you be more aware of the choices you are making.  What drugs may be needed?   The doctor may order drugs to:  Treat health problems that may cause weight gain  Lower your appetite  Help you lose weight  Will physical activity be limited?   Talk to your doctor about the right amount of exercise for you. This is especially important if you have heart problems, other illnesses, or if you recently had surgery.  Start slowly by doing more everyday activities like yard work or household chores.  Choose activities that you like to do.  What changes to diet are needed?   Whole grains are a good source of carbohydrates and fiber. Try to eat 3 to 5 servings of whole grain, high fiber foods each day. These are things like whole grain bread, bran cereals, brown rice, and whole wheat pasta.  Fruits and vegetables are good sources of fiber, vitamins, and minerals. Try to pick many kinds and colors. This will help you get different nutrients in your diet. Choose fresh, frozen, or canned fruits and vegetables. Buy plain, frozen fruits without added sugar. Buy plain, frozen vegetables without added salt and fat. Buy canned fruit in 100% juice or water. Avoid canned fruit in syrups. Buy canned vegetables with no salt added.  Milk is a good source of protein and some vitamins and minerals. Choose low-fat (1%) or fat-free milk. Eat nonfat or low-fat cheeses, ice creams, yogurt, and other dairy products.  Meats and beans are good sources of protein and iron. Eat more low-fat or lean meats like chicken without the skin, turkey without the skin, and fish. Eggs and peanut butter are good sources of protein as well. Dried peas, beans, and lentils are also good and contain fiber. Fatty fish, like salmon, tuna, mackerel, herring, and trout, are good to eat and have healthy  omega-3 fats.  Good fats can give you long-term energy. These are found in fish, nuts, seeds, and avocados. Try using olive oil, safflower oil, and low-sodium, low-fat salad dressing as a topping on foods. Use canola, olive, or peanut oil for cooking. Other healthy oils include corn, sunflower, and soybean oils.  Limit sweets such as candy and sugary drinks. Try to drink water instead. Drink diet or no calorie beverages when you want something other than water.  Cut back on solid fats, like butter, lard, and coconut oil.  Limit fatty foods such as desserts, fried foods, and chips.  Trans fats should be avoided. Most trans fats are found in processed foods, commercially baked goods, and fried foods and are very unhealthy. Saturated fat, which is different from trans fat, should be watched and limited if portions are too large. Saturated fat is found mainly in animal sources, such as meat and dairy products. Saturated fat is also found in coconut and palm oils.  Limit processed meats and most processed foods.  Limit eating out. If you choose to visit a restaurant, ask for the nutritional facts. You may also be able to find nutritional facts online. Then, you can make a plan and choose healthy items. Watch the portion size. Have large portions split and take part home for some other meal.  What problems could happen?   Low mood or self-esteem  Anxiety  Muscle pain, joint pain, or arthritis  Sleep apnea  Diabetes  High blood pressure  High cholesterol  Heart, lung, or breathing problems  Kidney or liver problems  Cancer  Gallstones  Increased sweating  Reduced fertility  Pregnancy complications  Gastroesophageal reflux disease  When do I need to call the doctor?   Signs of high or low blood sugar  Thoughts of hurting yourself  Teach Back: Helping You Understand   The Teach Back Method helps you understand the information we are giving you. After you talk with the staff, tell them in your own words what you learned. This  "helps to make sure the staff has described each thing clearly. It also helps to explain things that may have been confusing. Before going home, make sure you can do these:  I can tell you about my condition.  I can tell you what changes I need to make with my diet or drugs.  I can tell you what I will do if I have signs of a heart attack or stroke.  Where can I learn more?   Centers for Disease Control and Prevention  http://www.cdc.gov/obesity/adult/defining.html   NHS  http://www.nhs.uk/Conditions/Obesity/Pages/obesityprevention.aspx   Last Reviewed Date   2021-06-23  Consumer Information Use and Disclaimer   This information is not specific medical advice and does not replace information you receive from your health care provider. This is only a brief summary of general information. It does NOT include all information about conditions, illnesses, injuries, tests, procedures, treatments, therapies, discharge instructions or life-style choices that may apply to you. You must talk with your health care provider for complete information about your health and treatment options. This information should not be used to decide whether or not to accept your health care providers advice, instructions or recommendations. Only your health care provider has the knowledge and training to provide advice that is right for you.  Copyright   Copyright © 2021 [employer] and its affiliates and/or licensors. All rights reserved.  Patient Education       Weight Loss Diet   About this topic   There are many "trendy" weight loss diets that are popular today. Many of these diets can end up being more harmful than helpful. The healthiest way to lose weight is to burn more calories than you eat.  A weight loss diet should help you have a healthy view of eating. It is NOT healthy to stop eating to try and lose weight. A good diet plan will help you cut down your food intake and make healthy choices.  A healthy weight loss goal is 1 to 2 " pounds (0.5 to 1 kg) per week. Reducing calories in your diet, burning calories through exercise, or both can help you lose weight. Combining a healthy diet with regular physical activity can help you get the best results.  To cut calories in your diet you can:  Switch from whole milk to 1% or skim milk.  Switch from regular cheese to low-fat or fat-free cheese.  Use healthier condiment choices:  Fat-free or low-fat sour cream or salad dressings  Spray butter  Diet syrups or jellies over regular  Try frozen yogurt as a dessert rather than eating ice cream.  Skip the chips. Snack on carrots, vegetables, or fruit. If chips are a favorite of yours, try the baked style and watch portion size.  Eat grilled, roasted, boiled, broiled, or baked meats. Avoid deep-frying. Choose skinless poultry, lean red meat, lean cuts of pork, and fish for good protein sources.  Try flavored no-calorie orona. Do not drink soda and juices that have many calories.  Choose fruit instead of sweets.  General   Eating smaller meals more often may be helpful. This will keep you from overeating at your next meal. Also, eating meals slowly helps you feel full faster.  If eating 3 meals is a part of your lifestyle, choose more lean proteins and higher fiber foods to fill you up at each meal.  Do not skip meals. Most often if you skip a meal, you eat too much at the next meal.  Eat smaller portions. Use a smaller plate or bowl for meals, and when you are eating out, eat half and take the rest home.  Plan ahead. Plan your meals and grocery list before going to the store. Planning will keep you from getting meals from restaurants.  Do not go to the grocery store hungry. You are more likely to buy snacks that are not good for you.  Portion out snacks. When you are having a snack, instead of grabbing the whole bag, portion a small amount out to give yourself a stopping point.  Drink water before and after your meals to help fill you up without the  calories.  When eating starchy foods, choose whole-grain products. These have a lot of fiber which will make you feel full. Fiber also helps lower cholesterol and helps with bowel function.  If you need a helpful start, ask your doctor to send you to a dietitian for weight loss help.         What will the results be?   Losing excess weight will make your whole body healthier. You will have more energy for your daily activities and lower your risk for health problems.  What lifestyle changes are needed?   Stay active. Eating healthy is not always enough to lose weight. Burning calories by exercising is a big part of weight loss.  What foods are good to eat?   The key is to watch your portion sizes. It is best to choose foods that are lower in fat and calories.  Choose lean meats:  Boneless, skinless chicken breast  Pork loin  90% lean beef  Lean turkey meat  Fresh fish (not fried)  Choose low-fat dairy products:  1% or skim milk  Spray butter or margarine  Low-fat or fat-free cheese  Frozen yogurt or low-calorie ice cream  Choose fresh fruits, vegetables, beans and lentils, and whole wheat products more often.  Choose water to drink more often. Drink diet or no-calorie beverages when you want something other than water. Aim to get your calories from the foods you eat.  Choose smart snacks:  Fruits  Vegetables  Low-fat or nonfat yogurt  Low-fat or no-fat cheese, such as cottage cheese  Unsalted nuts  Hard-boiled egg  Hummus  Guacamole  Natural peanut butter  Popcorn with no butter ? use pepper, garlic, or another spice to taste  Whole grain crackers  What foods should be limited or avoided?   Limit high-fat, high-sodium, and high-calorie foods like:  Fried foods  Processed meats  Whole-fat dairy products  Candy, cookies, chips, pastries  Sausage, phillips, any full-fat meats  Soda, juice  Beer, wine, and mixed drinks (alcohol)  Will there be any other care needed?   What do I do first before trying to lose weight?  Talk  to your doctor and dietitian to see if you need to lose weight. Work with them to set your weight loss goals.  If you have a chronic illness, such as high blood sugar or high blood pressure, ask a doctor or dietitian what diet and exercise is right for you.  Ask your doctor about how much you are able to exercise and what type of exercise is good for you.  Helpful tips   Keep a food journal to help keep you on track.  Join a support group.  Tips for burning calories:  If your workplace is near your house, choose to walk or bike to work instead of driving.  Take 20-minute walks each day. Walk around during your lunch break. You will not only burn calories, but will raise your energy for the rest of the day.  Take the stairs over the elevator.  Join a gym or exercise class with a friend.  Try to exercise 30 minutes a day for overall health. Three 10-minute sessions work too. Aim for 60 to 90 minutes a day to lose weight.  Drink lots of water before, during, and after exercise.  Where can I learn more?   Academy of Nutrition and Dietetics  https://www.eatright.org/health/weight-loss/your-health-and-your-weight/back-to-basics-for-healthy-weight-loss   Centers for Disease Control and Prevention  https://www.cdc.gov/healthyweight/healthy_eating/index.html   Familydoctor.org  https://familydoctor.org/nutrition-weight-loss-need-know-fad-diets/   [facility]  https://www.nhs.uk/live-well/healthy-weight/12-tips-to-help-you-lose-weight/?tabname=you-and-your-weight   Last Reviewed Date   2021-06-24  Consumer Information Use and Disclaimer   This information is not specific medical advice and does not replace information you receive from your health care provider. This is only a brief summary of general information. It does NOT include all information about conditions, illnesses, injuries, tests, procedures, treatments, therapies, discharge instructions or life-style choices that may apply to you. You must talk with your health care  provider for complete information about your health and treatment options. This information should not be used to decide whether or not to accept your health care providers advice, instructions or recommendations. Only your health care provider has the knowledge and training to provide advice that is right for you.  Copyright   Copyright © 2021 [employer] and its affiliates and/or licensors. All rights reserved.

## 2022-09-15 ENCOUNTER — PATIENT MESSAGE (OUTPATIENT)
Dept: ADMINISTRATIVE | Facility: HOSPITAL | Age: 67
End: 2022-09-15
Payer: COMMERCIAL

## 2022-10-20 ENCOUNTER — PATIENT MESSAGE (OUTPATIENT)
Dept: ADMINISTRATIVE | Facility: HOSPITAL | Age: 67
End: 2022-10-20
Payer: MEDICARE

## 2022-10-20 DIAGNOSIS — Z12.11 SCREENING FOR COLON CANCER: ICD-10-CM

## 2023-01-13 LAB — HEMOCCULT STL QL IA: NEGATIVE

## 2023-02-09 DIAGNOSIS — Z00.00 ENCOUNTER FOR MEDICARE ANNUAL WELLNESS EXAM: ICD-10-CM

## 2023-03-16 ENCOUNTER — PES CALL (OUTPATIENT)
Dept: ADMINISTRATIVE | Facility: CLINIC | Age: 68
End: 2023-03-16
Payer: MEDICARE

## 2023-03-17 ENCOUNTER — LAB VISIT (OUTPATIENT)
Dept: LAB | Facility: HOSPITAL | Age: 68
End: 2023-03-17
Attending: INTERNAL MEDICINE
Payer: MEDICARE

## 2023-03-17 DIAGNOSIS — Z00.00 ROUTINE MEDICAL EXAM: ICD-10-CM

## 2023-03-17 LAB
ALBUMIN SERPL BCP-MCNC: 3.9 G/DL (ref 3.5–5.2)
ALP SERPL-CCNC: 60 U/L (ref 55–135)
ALT SERPL W/O P-5'-P-CCNC: 29 U/L (ref 10–44)
ANION GAP SERPL CALC-SCNC: 9 MMOL/L (ref 8–16)
AST SERPL-CCNC: 24 U/L (ref 10–40)
BASOPHILS # BLD AUTO: 0.06 K/UL (ref 0–0.2)
BASOPHILS NFR BLD: 0.8 % (ref 0–1.9)
BILIRUB SERPL-MCNC: 0.6 MG/DL (ref 0.1–1)
BUN SERPL-MCNC: 21 MG/DL (ref 8–23)
CALCIUM SERPL-MCNC: 9.8 MG/DL (ref 8.7–10.5)
CHLORIDE SERPL-SCNC: 104 MMOL/L (ref 95–110)
CHOLEST SERPL-MCNC: 210 MG/DL (ref 120–199)
CHOLEST/HDLC SERPL: 4 {RATIO} (ref 2–5)
CO2 SERPL-SCNC: 25 MMOL/L (ref 23–29)
COMPLEXED PSA SERPL-MCNC: 0.71 NG/ML (ref 0–4)
CREAT SERPL-MCNC: 0.9 MG/DL (ref 0.5–1.4)
DIFFERENTIAL METHOD: ABNORMAL
EOSINOPHIL # BLD AUTO: 0.2 K/UL (ref 0–0.5)
EOSINOPHIL NFR BLD: 2.8 % (ref 0–8)
ERYTHROCYTE [DISTWIDTH] IN BLOOD BY AUTOMATED COUNT: 13.2 % (ref 11.5–14.5)
EST. GFR  (NO RACE VARIABLE): >60 ML/MIN/1.73 M^2
GLUCOSE SERPL-MCNC: 85 MG/DL (ref 70–110)
HCT VFR BLD AUTO: 49 % (ref 40–54)
HDLC SERPL-MCNC: 52 MG/DL (ref 40–75)
HDLC SERPL: 24.8 % (ref 20–50)
HGB BLD-MCNC: 16 G/DL (ref 14–18)
IMM GRANULOCYTES # BLD AUTO: 0.03 K/UL (ref 0–0.04)
IMM GRANULOCYTES NFR BLD AUTO: 0.4 % (ref 0–0.5)
LDLC SERPL CALC-MCNC: 145 MG/DL (ref 63–159)
LYMPHOCYTES # BLD AUTO: 2.6 K/UL (ref 1–4.8)
LYMPHOCYTES NFR BLD: 36 % (ref 18–48)
MCH RBC QN AUTO: 31.9 PG (ref 27–31)
MCHC RBC AUTO-ENTMCNC: 32.7 G/DL (ref 32–36)
MCV RBC AUTO: 98 FL (ref 82–98)
MONOCYTES # BLD AUTO: 0.7 K/UL (ref 0.3–1)
MONOCYTES NFR BLD: 9.3 % (ref 4–15)
NEUTROPHILS # BLD AUTO: 3.7 K/UL (ref 1.8–7.7)
NEUTROPHILS NFR BLD: 50.7 % (ref 38–73)
NONHDLC SERPL-MCNC: 158 MG/DL
NRBC BLD-RTO: 0 /100 WBC
PLATELET # BLD AUTO: 246 K/UL (ref 150–450)
PMV BLD AUTO: 11.5 FL (ref 9.2–12.9)
POTASSIUM SERPL-SCNC: 4.4 MMOL/L (ref 3.5–5.1)
PROT SERPL-MCNC: 7.5 G/DL (ref 6–8.4)
RBC # BLD AUTO: 5.01 M/UL (ref 4.6–6.2)
SODIUM SERPL-SCNC: 138 MMOL/L (ref 136–145)
TRIGL SERPL-MCNC: 65 MG/DL (ref 30–150)
WBC # BLD AUTO: 7.2 K/UL (ref 3.9–12.7)

## 2023-03-17 PROCEDURE — 36415 COLL VENOUS BLD VENIPUNCTURE: CPT | Mod: HCNC,PO | Performed by: INTERNAL MEDICINE

## 2023-03-17 PROCEDURE — 80061 LIPID PANEL: CPT | Mod: HCNC | Performed by: INTERNAL MEDICINE

## 2023-03-17 PROCEDURE — 84153 ASSAY OF PSA TOTAL: CPT | Mod: HCNC | Performed by: INTERNAL MEDICINE

## 2023-03-17 PROCEDURE — 80053 COMPREHEN METABOLIC PANEL: CPT | Mod: HCNC | Performed by: INTERNAL MEDICINE

## 2023-03-17 PROCEDURE — 85025 COMPLETE CBC W/AUTO DIFF WBC: CPT | Mod: HCNC | Performed by: INTERNAL MEDICINE

## 2023-03-20 ENCOUNTER — TELEPHONE (OUTPATIENT)
Dept: FAMILY MEDICINE | Facility: CLINIC | Age: 68
End: 2023-03-20
Payer: MEDICARE

## 2023-03-20 NOTE — TELEPHONE ENCOUNTER
Pt was called to scheduled a appointment. Pt is scheduled for 6/19/2023. Pt refused to see any other provider.

## 2023-06-19 ENCOUNTER — OFFICE VISIT (OUTPATIENT)
Dept: FAMILY MEDICINE | Facility: CLINIC | Age: 68
End: 2023-06-19
Payer: MEDICARE

## 2023-06-19 VITALS
SYSTOLIC BLOOD PRESSURE: 130 MMHG | TEMPERATURE: 98 F | OXYGEN SATURATION: 95 % | HEART RATE: 94 BPM | HEIGHT: 72 IN | BODY MASS INDEX: 33.01 KG/M2 | DIASTOLIC BLOOD PRESSURE: 70 MMHG | WEIGHT: 243.75 LBS

## 2023-06-19 DIAGNOSIS — Z86.39 PERSONAL HISTORY OF OTHER ENDOCRINE, NUTRITIONAL AND METABOLIC DISEASE: ICD-10-CM

## 2023-06-19 DIAGNOSIS — E66.9 OBESITY (BMI 30.0-34.9): Chronic | ICD-10-CM

## 2023-06-19 DIAGNOSIS — Z12.5 SCREENING FOR PROSTATE CANCER: ICD-10-CM

## 2023-06-19 DIAGNOSIS — K13.79 HYPERTROPHY OF UVULA: Chronic | ICD-10-CM

## 2023-06-19 DIAGNOSIS — Z00.00 ROUTINE MEDICAL EXAM: Primary | ICD-10-CM

## 2023-06-19 DIAGNOSIS — E78.49 OTHER HYPERLIPIDEMIA: Chronic | ICD-10-CM

## 2023-06-19 DIAGNOSIS — R03.0 ELEVATED BLOOD PRESSURE READING WITHOUT DIAGNOSIS OF HYPERTENSION: Chronic | ICD-10-CM

## 2023-06-19 PROCEDURE — 99999 PR PBB SHADOW E&M-EST. PATIENT-LVL III: CPT | Mod: PBBFAC,,, | Performed by: INTERNAL MEDICINE

## 2023-06-19 PROCEDURE — 1160F RVW MEDS BY RX/DR IN RCRD: CPT | Mod: CPTII,S$GLB,, | Performed by: INTERNAL MEDICINE

## 2023-06-19 PROCEDURE — 1126F PR PAIN SEVERITY QUANTIFIED, NO PAIN PRESENT: ICD-10-PCS | Mod: CPTII,S$GLB,, | Performed by: INTERNAL MEDICINE

## 2023-06-19 PROCEDURE — 1159F PR MEDICATION LIST DOCUMENTED IN MEDICAL RECORD: ICD-10-PCS | Mod: CPTII,S$GLB,, | Performed by: INTERNAL MEDICINE

## 2023-06-19 PROCEDURE — 1126F AMNT PAIN NOTED NONE PRSNT: CPT | Mod: CPTII,S$GLB,, | Performed by: INTERNAL MEDICINE

## 2023-06-19 PROCEDURE — 3008F PR BODY MASS INDEX (BMI) DOCUMENTED: ICD-10-PCS | Mod: CPTII,S$GLB,, | Performed by: INTERNAL MEDICINE

## 2023-06-19 PROCEDURE — 3075F SYST BP GE 130 - 139MM HG: CPT | Mod: CPTII,S$GLB,, | Performed by: INTERNAL MEDICINE

## 2023-06-19 PROCEDURE — 99397 PR PREVENTIVE VISIT,EST,65 & OVER: ICD-10-PCS | Mod: S$GLB,,, | Performed by: INTERNAL MEDICINE

## 2023-06-19 PROCEDURE — 99397 PER PM REEVAL EST PAT 65+ YR: CPT | Mod: S$GLB,,, | Performed by: INTERNAL MEDICINE

## 2023-06-19 PROCEDURE — 1160F PR REVIEW ALL MEDS BY PRESCRIBER/CLIN PHARMACIST DOCUMENTED: ICD-10-PCS | Mod: CPTII,S$GLB,, | Performed by: INTERNAL MEDICINE

## 2023-06-19 PROCEDURE — 1159F MED LIST DOCD IN RCRD: CPT | Mod: CPTII,S$GLB,, | Performed by: INTERNAL MEDICINE

## 2023-06-19 PROCEDURE — 3078F DIAST BP <80 MM HG: CPT | Mod: CPTII,S$GLB,, | Performed by: INTERNAL MEDICINE

## 2023-06-19 PROCEDURE — 3288F PR FALLS RISK ASSESSMENT DOCUMENTED: ICD-10-PCS | Mod: CPTII,S$GLB,, | Performed by: INTERNAL MEDICINE

## 2023-06-19 PROCEDURE — 1101F PR PT FALLS ASSESS DOC 0-1 FALLS W/OUT INJ PAST YR: ICD-10-PCS | Mod: CPTII,S$GLB,, | Performed by: INTERNAL MEDICINE

## 2023-06-19 PROCEDURE — 3075F PR MOST RECENT SYSTOLIC BLOOD PRESS GE 130-139MM HG: ICD-10-PCS | Mod: CPTII,S$GLB,, | Performed by: INTERNAL MEDICINE

## 2023-06-19 PROCEDURE — 3078F PR MOST RECENT DIASTOLIC BLOOD PRESSURE < 80 MM HG: ICD-10-PCS | Mod: CPTII,S$GLB,, | Performed by: INTERNAL MEDICINE

## 2023-06-19 PROCEDURE — 99999 PR PBB SHADOW E&M-EST. PATIENT-LVL III: ICD-10-PCS | Mod: PBBFAC,,, | Performed by: INTERNAL MEDICINE

## 2023-06-19 PROCEDURE — 3288F FALL RISK ASSESSMENT DOCD: CPT | Mod: CPTII,S$GLB,, | Performed by: INTERNAL MEDICINE

## 2023-06-19 PROCEDURE — 1101F PT FALLS ASSESS-DOCD LE1/YR: CPT | Mod: CPTII,S$GLB,, | Performed by: INTERNAL MEDICINE

## 2023-06-19 PROCEDURE — 3008F BODY MASS INDEX DOCD: CPT | Mod: CPTII,S$GLB,, | Performed by: INTERNAL MEDICINE

## 2023-06-19 NOTE — PATIENT INSTRUCTIONS
Red rice stacy Ahmadi (Gastroenterology)    [place]:  Dr. DEMOND Whipples or Dr. OMAIRA Ann or Dr. ALO Coyle. Alternative Dr. Zahraa Garza

## 2023-06-19 NOTE — PROGRESS NOTES
Assessment & Plan  Problem List Items Addressed This Visit          ENT    Hypertrophy of uvula (Chronic)    Overview     Causes dysphagia. ENT recommended excision.  Patient prefers not to have surgery.          Current Assessment & Plan     Patient is ready to talk to ENT about surgical intervention            Cardiac/Vascular    Other hyperlipidemia (Chronic)    Overview     ASCVD risk score indicates need for statin.  I have counseled the patient on this risk factor as well also recommendation for statin use for reduction of heart attack and stroke risk.  He has verbally acknowledged this information and wishes NOT to pursue statins.  He is aware that this decision leaves him untreated for significant cardiovascular risk         Relevant Orders    Comprehensive Metabolic Panel    Lipid Panel    Elevated blood pressure reading without diagnosis of hypertension (Chronic)    Overview     Good home BP log            Endocrine    Obesity (BMI 30.0-34.9) (Chronic)    Current Assessment & Plan     The patient's BMI has been recorded in the chart. The patient has been provided educational materials regarding the benefits of attaining and maintaining a normal weight. We will continue to address and follow this issue during follow up visits.           Relevant Orders    Hemoglobin A1C     Other Visit Diagnoses       Routine medical exam    -  Primary  -    Discussed healthy diet, regular exercise, necessary labs, age appropriate cancer screening, and routine vaccinations.       Screening for prostate cancer    -  Screen 1 year    Relevant Orders    PSA, Screening    Personal history of other endocrine, nutritional and metabolic disease    -  Due for routine A1c screening with next labs    Relevant Orders    Hemoglobin A1C              Health Maintenance reviewed, as above.  Counseled on vaccines multiple times but he declines.    Follow-up: No follow-ups on  file.  ______________________________________________________________________    Chief Complaint  Chief Complaint   Patient presents with    Annual Exam       HPI  Ron Christina is a 67 y.o. male with medical diagnoses as listed in the medical history and problem list that presents for routine physical.  Pt is known to me with their last appointment Visit date not found.    He had labs earlier this year that showed generally reassuring CBC CMP PSA.  His lipid panel slightly improved but he continues to have a LDL as high    No acute complaints.     The 10-year ASCVD risk score (Keyanna MORGAN, et al., 2019) is: 16.3%    Values used to calculate the score:      Age: 67 years      Sex: Male      Is Non- : No      Diabetic: No      Tobacco smoker: No      Systolic Blood Pressure: 130 mmHg      Is BP treated: No      HDL Cholesterol: 52 mg/dL      Total Cholesterol: 210 mg/dL    PAST MEDICAL HISTORY:  Past Medical History:   Diagnosis Date    Allergy     COVID-19 07/2021    presumed Delta strain    Deep vein thrombosis        PAST SURGICAL HISTORY:  Past Surgical History:   Procedure Laterality Date    HERNIA REPAIR         SOCIAL HISTORY:  Social History     Socioeconomic History    Marital status:    Tobacco Use    Smoking status: Never     Passive exposure: Never    Smokeless tobacco: Never   Substance and Sexual Activity    Alcohol use: Yes    Drug use: No       FAMILY HISTORY:  Family History   Problem Relation Age of Onset    Alcohol abuse Mother     Asthma Father        ALLERGIES AND MEDICATIONS: updated and reviewed.  Review of patient's allergies indicates:   Allergen Reactions    Penicillins Other (See Comments)     No current outpatient medications on file.     No current facility-administered medications for this visit.         ROS  Review of Systems   Constitutional:  Negative for chills, fever and unexpected weight change.   Respiratory:  Negative for cough, chest tightness,  shortness of breath and wheezing.    Cardiovascular:  Negative for chest pain, palpitations and leg swelling.   Gastrointestinal:  Negative for abdominal pain and blood in stool.   Genitourinary:  Negative for decreased urine volume, dysuria and hematuria.   Musculoskeletal:  Negative for arthralgias and myalgias.   Neurological:  Negative for dizziness, weakness, light-headedness and headaches.   Psychiatric/Behavioral:  Negative for decreased concentration, dysphoric mood, sleep disturbance and suicidal ideas.          Physical Exam  Vitals:    06/19/23 0947 06/19/23 1002   BP: (!) 138/90 130/70   Pulse: 94    Temp: 97.7 °F (36.5 °C)    SpO2: 95%    Weight: 110.6 kg (243 lb 11.5 oz)    Height: 6' (1.829 m)     Body mass index is 33.05 kg/m².  Weight: 110.6 kg (243 lb 11.5 oz)   Height: 6' (182.9 cm)   Physical Exam  Constitutional:       General: He is not in acute distress.     Appearance: He is well-developed.   HENT:      Head: Normocephalic and atraumatic.   Eyes:      General: Lids are normal. No scleral icterus.     Conjunctiva/sclera: Conjunctivae normal.      Pupils: Pupils are equal, round, and reactive to light.   Neck:      Thyroid: No thyromegaly.      Vascular: No carotid bruit or JVD.   Cardiovascular:      Rate and Rhythm: Normal rate and regular rhythm.      Heart sounds: Normal heart sounds. No murmur heard.    No friction rub. No S3 or S4 sounds.   Pulmonary:      Effort: Pulmonary effort is normal.      Breath sounds: Normal breath sounds. No wheezing, rhonchi or rales.   Abdominal:      General: Bowel sounds are normal. There is no distension.      Palpations: Abdomen is soft.      Tenderness: There is no abdominal tenderness.   Musculoskeletal:         General: No tenderness.      Cervical back: Full passive range of motion without pain and neck supple.      Right lower leg: No edema.      Left lower leg: No edema.   Skin:     General: Skin is warm and dry.      Findings: No rash.    Neurological:      Mental Status: He is alert and oriented to person, place, and time.   Psychiatric:         Speech: Speech normal.         Behavior: Behavior normal.         Thought Content: Thought content normal.           Health Maintenance         Date Due Completion Date    TETANUS VACCINE Never done ---    Hemoglobin A1c (Diabetic Prevention Screening) Never done ---    Shingles Vaccine (1 of 2) Never done ---    Pneumococcal Vaccines (Age 65+) (1 - PCV) Never done ---    Influenza Vaccine (Season Ended) 09/01/2023 12/17/2018    Colorectal Cancer Screening 01/02/2024 1/2/2023    PROSTATE-SPECIFIC ANTIGEN 03/17/2024 3/17/2023    Lipid Panel 03/17/2028 3/17/2023

## 2023-06-26 ENCOUNTER — PATIENT MESSAGE (OUTPATIENT)
Dept: FAMILY MEDICINE | Facility: CLINIC | Age: 68
End: 2023-06-26
Payer: MEDICARE

## 2023-07-10 ENCOUNTER — PES CALL (OUTPATIENT)
Dept: ADMINISTRATIVE | Facility: CLINIC | Age: 68
End: 2023-07-10
Payer: MEDICARE

## 2023-08-11 ENCOUNTER — OFFICE VISIT (OUTPATIENT)
Dept: FAMILY MEDICINE | Facility: CLINIC | Age: 68
End: 2023-08-11
Payer: MEDICARE

## 2023-08-11 VITALS
HEART RATE: 82 BPM | BODY MASS INDEX: 33.35 KG/M2 | TEMPERATURE: 98 F | SYSTOLIC BLOOD PRESSURE: 128 MMHG | DIASTOLIC BLOOD PRESSURE: 72 MMHG | WEIGHT: 246.25 LBS | OXYGEN SATURATION: 96 % | HEIGHT: 72 IN

## 2023-08-11 DIAGNOSIS — E78.49 OTHER HYPERLIPIDEMIA: Chronic | ICD-10-CM

## 2023-08-11 DIAGNOSIS — Z00.00 ENCOUNTER FOR PREVENTIVE HEALTH EXAMINATION: Primary | ICD-10-CM

## 2023-08-11 DIAGNOSIS — E66.9 OBESITY (BMI 30.0-34.9): Chronic | ICD-10-CM

## 2023-08-11 DIAGNOSIS — K42.9 UMBILICAL HERNIA WITHOUT OBSTRUCTION AND WITHOUT GANGRENE: Chronic | ICD-10-CM

## 2023-08-11 PROCEDURE — 1170F PR FUNCTIONAL STATUS ASSESSED: ICD-10-PCS | Mod: HCNC,CPTII,S$GLB, | Performed by: NURSE PRACTITIONER

## 2023-08-11 PROCEDURE — G0439 PR MEDICARE ANNUAL WELLNESS SUBSEQUENT VISIT: ICD-10-PCS | Mod: HCNC,S$GLB,, | Performed by: NURSE PRACTITIONER

## 2023-08-11 PROCEDURE — 1101F PT FALLS ASSESS-DOCD LE1/YR: CPT | Mod: HCNC,CPTII,S$GLB, | Performed by: NURSE PRACTITIONER

## 2023-08-11 PROCEDURE — 1126F PR PAIN SEVERITY QUANTIFIED, NO PAIN PRESENT: ICD-10-PCS | Mod: HCNC,CPTII,S$GLB, | Performed by: NURSE PRACTITIONER

## 2023-08-11 PROCEDURE — 3074F SYST BP LT 130 MM HG: CPT | Mod: HCNC,CPTII,S$GLB, | Performed by: NURSE PRACTITIONER

## 2023-08-11 PROCEDURE — 1101F PR PT FALLS ASSESS DOC 0-1 FALLS W/OUT INJ PAST YR: ICD-10-PCS | Mod: HCNC,CPTII,S$GLB, | Performed by: NURSE PRACTITIONER

## 2023-08-11 PROCEDURE — 3288F FALL RISK ASSESSMENT DOCD: CPT | Mod: HCNC,CPTII,S$GLB, | Performed by: NURSE PRACTITIONER

## 2023-08-11 PROCEDURE — 1170F FXNL STATUS ASSESSED: CPT | Mod: HCNC,CPTII,S$GLB, | Performed by: NURSE PRACTITIONER

## 2023-08-11 PROCEDURE — G0439 PPPS, SUBSEQ VISIT: HCPCS | Mod: HCNC,S$GLB,, | Performed by: NURSE PRACTITIONER

## 2023-08-11 PROCEDURE — 1159F MED LIST DOCD IN RCRD: CPT | Mod: HCNC,CPTII,S$GLB, | Performed by: NURSE PRACTITIONER

## 2023-08-11 PROCEDURE — 3074F PR MOST RECENT SYSTOLIC BLOOD PRESSURE < 130 MM HG: ICD-10-PCS | Mod: HCNC,CPTII,S$GLB, | Performed by: NURSE PRACTITIONER

## 2023-08-11 PROCEDURE — 1159F PR MEDICATION LIST DOCUMENTED IN MEDICAL RECORD: ICD-10-PCS | Mod: HCNC,CPTII,S$GLB, | Performed by: NURSE PRACTITIONER

## 2023-08-11 PROCEDURE — 3078F DIAST BP <80 MM HG: CPT | Mod: HCNC,CPTII,S$GLB, | Performed by: NURSE PRACTITIONER

## 2023-08-11 PROCEDURE — 1126F AMNT PAIN NOTED NONE PRSNT: CPT | Mod: HCNC,CPTII,S$GLB, | Performed by: NURSE PRACTITIONER

## 2023-08-11 PROCEDURE — 1160F RVW MEDS BY RX/DR IN RCRD: CPT | Mod: HCNC,CPTII,S$GLB, | Performed by: NURSE PRACTITIONER

## 2023-08-11 PROCEDURE — 99999 PR PBB SHADOW E&M-EST. PATIENT-LVL III: ICD-10-PCS | Mod: PBBFAC,HCNC,, | Performed by: NURSE PRACTITIONER

## 2023-08-11 PROCEDURE — 99999 PR PBB SHADOW E&M-EST. PATIENT-LVL III: CPT | Mod: PBBFAC,HCNC,, | Performed by: NURSE PRACTITIONER

## 2023-08-11 PROCEDURE — 3008F BODY MASS INDEX DOCD: CPT | Mod: HCNC,CPTII,S$GLB, | Performed by: NURSE PRACTITIONER

## 2023-08-11 PROCEDURE — 3288F PR FALLS RISK ASSESSMENT DOCUMENTED: ICD-10-PCS | Mod: HCNC,CPTII,S$GLB, | Performed by: NURSE PRACTITIONER

## 2023-08-11 PROCEDURE — 1160F PR REVIEW ALL MEDS BY PRESCRIBER/CLIN PHARMACIST DOCUMENTED: ICD-10-PCS | Mod: HCNC,CPTII,S$GLB, | Performed by: NURSE PRACTITIONER

## 2023-08-11 PROCEDURE — 3008F PR BODY MASS INDEX (BMI) DOCUMENTED: ICD-10-PCS | Mod: HCNC,CPTII,S$GLB, | Performed by: NURSE PRACTITIONER

## 2023-08-11 PROCEDURE — 3078F PR MOST RECENT DIASTOLIC BLOOD PRESSURE < 80 MM HG: ICD-10-PCS | Mod: HCNC,CPTII,S$GLB, | Performed by: NURSE PRACTITIONER

## 2023-08-11 NOTE — PATIENT INSTRUCTIONS
Counseling and Referral of Other Preventative  (Italic type indicates deductible and co-insurance are waived)    Patient Name: Ron Christina  Today's Date: 8/11/2023    Health Maintenance       Date Due Completion Date    TETANUS VACCINE Never done ---    Hemoglobin A1c (Diabetic Prevention Screening) Never done ---    Shingles Vaccine (1 of 2) Never done ---    Pneumococcal Vaccines (Age 65+) (1 - PCV) Never done ---    Influenza Vaccine (1) 09/01/2023 12/17/2018    Colorectal Cancer Screening 01/02/2024 1/2/2023    PROSTATE-SPECIFIC ANTIGEN 03/17/2024 3/17/2023    Lipid Panel 03/17/2028 3/17/2023        No orders of the defined types were placed in this encounter.      The following information is provided to all patients.  This information is to help you find resources for any of the problems found today that may be affecting your health:                Living healthy guide: www.[facility].[email or web address]      Understanding Diabetes: www.diabetes.org      Eating healthy: www.cdc.gov/healthyweight      [facility] home safety checklist: www.cdc.gov/steadi/patient.html      Agency on Aging: www.goea.[place]      Alcoholics anonymous (AA): www.aa.org      Physical Activity: www.chey.nih.gov/dk6sxeo      Tobacco use: www.quitwithusla.org

## 2023-08-11 NOTE — PROGRESS NOTES
Ron Christina presented for a  Medicare AWV and comprehensive Health Risk Assessment today. The following components were reviewed and updated:    Medical history  Family History  Social history  Allergies and Current Medications  Health Risk Assessment  Health Maintenance  Care Team       ** See Completed Assessments for Annual Wellness Visit within the encounter summary.**       The following assessments were completed:  Living Situation  CAGE  Depression Screening  Timed Get Up and Go  Whisper Test  Cognitive Function Screening  Nutrition Screening  ADL Screening  PAQ Screening          Vitals:    08/11/23 0929   BP: 128/72   BP Location: Right arm   Patient Position: Sitting   BP Method: Large (Manual)   Pulse: 82   Temp: 98.2 °F (36.8 °C)   TempSrc: Oral   SpO2: 96%   Weight: 111.7 kg (246 lb 4.1 oz)   Height: 6' (1.829 m)     Body mass index is 33.4 kg/m².  Physical Exam  Vitals and nursing note reviewed.   Constitutional:       Appearance: Normal appearance.   Cardiovascular:      Rate and Rhythm: Normal rate.      Pulses: Normal pulses.      Heart sounds: Normal heart sounds.   Pulmonary:      Effort: Pulmonary effort is normal.      Breath sounds: Normal breath sounds.   Musculoskeletal:         General: Normal range of motion.   Neurological:      Mental Status: He is alert and oriented to person, place, and time.   Psychiatric:         Mood and Affect: Mood normal.         Behavior: Behavior normal.             Diagnoses and health risks identified today and associated recommendations/orders:    1. Encounter for preventive health examination  Pt was seen today for an Annual Wellness visit. Healthcare maintenance and screening recommendations were discussed and updated as indicated. Return in one year for AWV.    Review current opioid prescriptions:n/a  Screen for potential Substance Use Disorders:n/a    2. Other hyperlipidemia  The current medical regimen is effective;  continue present plan and  medications.    3. Obesity (BMI 30.0-34.9)  The patient is asked to make an attempt to improve diet and exercise patterns to aid in medical management of this problem.    4. Umbilical hernia without obstruction and without gangrene  The current medical regimen is effective;  continue present plan and medications.        Provided Ron with a 5-10 year written screening schedule and personal prevention plan. Recommendations were developed using the USPSTF age appropriate recommendations. Education, counseling, and referrals were provided as needed. After Visit Summary printed and given to patient which includes a list of additional screenings\tests needed.    Follow up in about 1 year (around 8/11/2024).    YUAN Orr  I offered to discuss advanced care planning, including how to pick a person who would make decisions for you if you were unable to make them for yourself, called a health care power of , and what kind of decisions you might make such as use of life sustaining treatments such as ventilators and tube feeding when faced with a life limiting illness recorded on a living will that they will need to know. (How you want to be cared for as you near the end of your natural life)     X Patient is interested in learning more about how to make advanced directives.  I provided them paperwork and offered to discuss this with them.

## 2023-08-18 NOTE — PROGRESS NOTES
Name: Shantanu Kendrick      : 10/7/1933      MRN: [id number]  Encounter Provider: Seymour Garcia MD  Encounter Date: 2023   Encounter department: [address]     1. Mass of skin of right lower leg  Comments:  concern for SCC. I do think a biopsy would be needed. Agree on evaluation through their Dermatologist, Dr. Reji Noe. Subjective      Pt here for mass on the right leg. There has been a lesion on that area. However in the past month it has grown in size. Has been tender to touch. No discharge. No induration. No fever, no chills. Review of Systems   Constitutional: Negative for activity change and appetite change.        Current Outpatient Medications on File Prior to Visit   Medication Sig   • amLODIPine (NORVASC) 5 mg tablet Take 1 tablet (5 mg total) by mouth daily   • Ascorbic Acid (VITAMIN C) 500 MG CAPS Take 1 tablet by mouth daily   • aspirin (ECOTRIN LOW STRENGTH) 81 mg EC tablet Take 81 mg by mouth daily   • atorvastatin (LIPITOR) 20 mg tablet Take 1 tablet (20 mg total) by mouth daily with dinner   • carvedilol (COREG) 6.25 mg tablet TAKE 1 TABLET BY MOUTH TWICE DAILY WITH MEALS   • finasteride (PROSCAR) 5 mg tablet Take 1 tablet (5 mg total) by mouth daily   • Flaxseed, Linseed, (FLAXSEED OIL) 1000 MG CAPS Take 1 capsule by mouth daily   • fluticasone (FLONASE) 50 mcg/act nasal spray 2 sprays into each nostril daily   • Glucosamine-Chondroit-Vit C-Mn (GLUCOSAMINE 1500 COMPLEX) CAPS Take 1 capsule by mouth daily   • levothyroxine 100 mcg tablet TAKE ONE TABLET BY MOUTH EVERY DAY   • Multiple Vitamin (MULTIVITAMIN) capsule Take 1 capsule by mouth daily   • nitroglycerin (NITROSTAT) 0.4 mg SL tablet Place 1 tablet (0.4 mg total) under the tongue every 5 (five) minutes as needed for chest pain   • tamsulosin (FLOMAX) 0.4 mg TAKE ONE CAPSULE BY MOUTH TWICE A DAY   • betamethasone, augmented, (DIPROLENE-AF) 0.05 % cream Subjective:       Patient ID: Ron Christina is a 62 y.o. male.    Chief Complaint: Consult (DVT)    HPI     REASON FOR CONSULTATION: DVT of RLE  REFERRING  PHYSICIAN: Selvin aRe MD    Patient is a 62-year-old gentleman today in consultation for history of the right DVT of tibial vein diagnosed 6/20/17.  He has completed 3 months of Xarelto therapy approximately 3 weeks ago. No prior history of clots.  No family history of clots.  No associated trauma.  No recent surgeries.  He is a lifelong nonsmoker.  He discontinued Xarelto therapy approximately 3 months ago.  He was traveling in a cruise and by plane of 2 days prior to diagnosis.  Patient reports a plane ride was approximately 2 hours.  He noticed some sudden onset of calf pain while standing up.  He noted that right lower extremity was swollen as well. US venous RLE showed an occluding thrombus involving the right posterior tibial vein.  The remainder of the right lower extremity venous structures demonstrate normal compressibility and flow.  No shortness of breath or chest pain.  He no longer has any pain in his legs or leg swelling.  He is here for further evaluation.    .    Past Medical History:   Diagnosis Date    Allergy     Deep vein thrombosis         Past Surgical History:   Procedure Laterality Date    HERNIA REPAIR         Review of Systems   Constitutional: Negative for appetite change, fatigue, fever and unexpected weight change.   HENT: Negative for mouth sores.    Eyes: Negative for visual disturbance.   Respiratory: Negative for cough and shortness of breath.    Cardiovascular: Negative for chest pain.   Gastrointestinal: Negative for abdominal pain and diarrhea.   Genitourinary: Negative for frequency.   Musculoskeletal: Negative for back pain.   Skin: Negative for rash.   Neurological: Negative for headaches.   Hematological: Negative for adenopathy.   Psychiatric/Behavioral: The patient is not nervous/anxious.        Objective:        Vitals:    10/30/17 1030   BP: 126/85   BP Location: Left arm   Patient Position: Sitting   BP Method: Medium (Automatic)   Pulse: 93   Temp: 99 °F (37.2 °C)   TempSrc: Oral   SpO2: (!) 93%   Weight: 109.6 kg (241 lb 11.8 oz)   Height: 6' (1.829 m)       Physical Exam   Constitutional: He is oriented to person, place, and time. He appears well-developed and well-nourished.   HENT:   Head: Normocephalic.   Mouth/Throat: Oropharynx is clear and moist. No oropharyngeal exudate.   Eyes: Conjunctivae are normal. Pupils are equal, round, and reactive to light. No scleral icterus.   Neck: Normal range of motion. Neck supple. No thyromegaly present.   Cardiovascular: Normal rate, regular rhythm and normal heart sounds.    No murmur heard.  Pulmonary/Chest: Effort normal and breath sounds normal. He has no wheezes. He has no rales.   Abdominal: Soft. Bowel sounds are normal. He exhibits no distension and no mass. There is no hepatosplenomegaly. There is no tenderness. There is no rebound and no guarding.   Musculoskeletal: Normal range of motion. He exhibits no edema.   Lymphadenopathy:     He has no cervical adenopathy.     He has no axillary adenopathy.        Right: No supraclavicular adenopathy present.        Left: No supraclavicular adenopathy present.   Neurological: He is alert and oriented to person, place, and time. No cranial nerve deficit.   Skin: No rash noted. No erythema.   Psychiatric: He has a normal mood and affect.       US venous RLE 6/20/2017  Results:Duplex scan of the right lower extremity was performed using B-Mode/gray scale imaging and Doppler spectral analysis and color flow.  There is an occluding thrombus involving the right posterior tibial vein.  The remainder of the right lower extremity venous structures demonstrate normal compressibility and flow.   Deep venous thrombosis of the right posterior tibial vein    Assessment:       1. Deep vein thrombosis (DVT) of tibial vein of right lower  Apply topically 2 (two) times a day (Patient not taking: Reported on 8/18/2023)   • Cyanocobalamin (VITAMIN B12) 1000 MCG TBCR Take 1 tablet by mouth daily (Patient not taking: Reported on 8/18/2023)       Objective     /92   Pulse 74   Temp (!) 96.9 °F (36.1 °C)   Ht 5' 10" (1.778 m)   Wt 75.2 kg (165 lb 12.8 oz)   BMI 23.79 kg/m²     Physical Exam  Vitals and nursing note reviewed. Constitutional:       Appearance: Normal appearance. Skin:     Comments: Mass on the skin, circular, about 1 cm in diamter. Tender to touch, not fluctuant. No purulence   Neurological:      Mental Status: He is alert.        Oscar Brownlee MD extremity, unspecified chronicity        Plan:   In summary, patient is a 62-year-old diagnosed with DVT of the right posterior tibial vein in June 2017 and he has no definitive identifiable risk factors.  It is unclear whether his traveling of 2 hours may been a contributing factor.  In any event, he has no prior history of clots no family history of clots.  Thrombophilia workup is not indicated.  Plan follow-up ultrasound of lower extremities.  It is likely that he will not need to resume anticoagulation therapy and 3 months duration is adequate with distal DVT. All questions posed answered to patient's satisfaction    Thank you for allowing me to produce pain the care of your patient    Cc:  Selvin Rae MD

## 2023-09-02 ENCOUNTER — PATIENT MESSAGE (OUTPATIENT)
Dept: FAMILY MEDICINE | Facility: CLINIC | Age: 68
End: 2023-09-02
Payer: MEDICARE

## 2023-09-06 ENCOUNTER — OFFICE VISIT (OUTPATIENT)
Dept: FAMILY MEDICINE | Facility: CLINIC | Age: 68
End: 2023-09-06
Payer: MEDICARE

## 2023-09-06 VITALS
HEART RATE: 92 BPM | BODY MASS INDEX: 33.01 KG/M2 | DIASTOLIC BLOOD PRESSURE: 90 MMHG | SYSTOLIC BLOOD PRESSURE: 120 MMHG | OXYGEN SATURATION: 95 % | HEIGHT: 72 IN | WEIGHT: 243.75 LBS | TEMPERATURE: 98 F

## 2023-09-06 DIAGNOSIS — R10.32 GROIN PAIN, LEFT: Primary | ICD-10-CM

## 2023-09-06 DIAGNOSIS — Z87.19 S/P BILATERAL INGUINAL HERNIA REPAIR: ICD-10-CM

## 2023-09-06 DIAGNOSIS — Z98.890 S/P BILATERAL INGUINAL HERNIA REPAIR: ICD-10-CM

## 2023-09-06 DIAGNOSIS — K40.20 BILATERAL INGUINAL HERNIA WITHOUT OBSTRUCTION OR GANGRENE, RECURRENCE NOT SPECIFIED: ICD-10-CM

## 2023-09-06 PROCEDURE — 1125F AMNT PAIN NOTED PAIN PRSNT: CPT | Mod: HCNC,CPTII,S$GLB, | Performed by: INTERNAL MEDICINE

## 2023-09-06 PROCEDURE — 3008F BODY MASS INDEX DOCD: CPT | Mod: HCNC,CPTII,S$GLB, | Performed by: INTERNAL MEDICINE

## 2023-09-06 PROCEDURE — 99214 OFFICE O/P EST MOD 30 MIN: CPT | Mod: HCNC,S$GLB,, | Performed by: INTERNAL MEDICINE

## 2023-09-06 PROCEDURE — 3288F PR FALLS RISK ASSESSMENT DOCUMENTED: ICD-10-PCS | Mod: HCNC,CPTII,S$GLB, | Performed by: INTERNAL MEDICINE

## 2023-09-06 PROCEDURE — 1101F PT FALLS ASSESS-DOCD LE1/YR: CPT | Mod: HCNC,CPTII,S$GLB, | Performed by: INTERNAL MEDICINE

## 2023-09-06 PROCEDURE — 1160F PR REVIEW ALL MEDS BY PRESCRIBER/CLIN PHARMACIST DOCUMENTED: ICD-10-PCS | Mod: HCNC,CPTII,S$GLB, | Performed by: INTERNAL MEDICINE

## 2023-09-06 PROCEDURE — 99999 PR PBB SHADOW E&M-EST. PATIENT-LVL IV: CPT | Mod: PBBFAC,HCNC,, | Performed by: INTERNAL MEDICINE

## 2023-09-06 PROCEDURE — 1159F PR MEDICATION LIST DOCUMENTED IN MEDICAL RECORD: ICD-10-PCS | Mod: HCNC,CPTII,S$GLB, | Performed by: INTERNAL MEDICINE

## 2023-09-06 PROCEDURE — 1101F PR PT FALLS ASSESS DOC 0-1 FALLS W/OUT INJ PAST YR: ICD-10-PCS | Mod: HCNC,CPTII,S$GLB, | Performed by: INTERNAL MEDICINE

## 2023-09-06 PROCEDURE — 3008F PR BODY MASS INDEX (BMI) DOCUMENTED: ICD-10-PCS | Mod: HCNC,CPTII,S$GLB, | Performed by: INTERNAL MEDICINE

## 2023-09-06 PROCEDURE — 3288F FALL RISK ASSESSMENT DOCD: CPT | Mod: HCNC,CPTII,S$GLB, | Performed by: INTERNAL MEDICINE

## 2023-09-06 PROCEDURE — 1125F PR PAIN SEVERITY QUANTIFIED, PAIN PRESENT: ICD-10-PCS | Mod: HCNC,CPTII,S$GLB, | Performed by: INTERNAL MEDICINE

## 2023-09-06 PROCEDURE — 99214 PR OFFICE/OUTPT VISIT, EST, LEVL IV, 30-39 MIN: ICD-10-PCS | Mod: HCNC,S$GLB,, | Performed by: INTERNAL MEDICINE

## 2023-09-06 PROCEDURE — 3080F DIAST BP >= 90 MM HG: CPT | Mod: HCNC,CPTII,S$GLB, | Performed by: INTERNAL MEDICINE

## 2023-09-06 PROCEDURE — 1159F MED LIST DOCD IN RCRD: CPT | Mod: HCNC,CPTII,S$GLB, | Performed by: INTERNAL MEDICINE

## 2023-09-06 PROCEDURE — 3074F SYST BP LT 130 MM HG: CPT | Mod: HCNC,CPTII,S$GLB, | Performed by: INTERNAL MEDICINE

## 2023-09-06 PROCEDURE — 3080F PR MOST RECENT DIASTOLIC BLOOD PRESSURE >= 90 MM HG: ICD-10-PCS | Mod: HCNC,CPTII,S$GLB, | Performed by: INTERNAL MEDICINE

## 2023-09-06 PROCEDURE — 3074F PR MOST RECENT SYSTOLIC BLOOD PRESSURE < 130 MM HG: ICD-10-PCS | Mod: HCNC,CPTII,S$GLB, | Performed by: INTERNAL MEDICINE

## 2023-09-06 PROCEDURE — 99999 PR PBB SHADOW E&M-EST. PATIENT-LVL IV: ICD-10-PCS | Mod: PBBFAC,HCNC,, | Performed by: INTERNAL MEDICINE

## 2023-09-06 PROCEDURE — 1160F RVW MEDS BY RX/DR IN RCRD: CPT | Mod: HCNC,CPTII,S$GLB, | Performed by: INTERNAL MEDICINE

## 2023-09-06 NOTE — PROGRESS NOTES
Assessment & Plan  Problem List Items Addressed This Visit    None  Visit Diagnoses       Groin pain, left    -  Primary    Relevant Orders    CT Pelvis W Wo  Contrast    Creatinine, serum    S/P bilateral inguinal hernia repair        Relevant Orders    CT Pelvis W Wo  Contrast    Creatinine, serum    Bilateral inguinal hernia without obstruction or gangrene, recurrence not specified        Relevant Orders    CT Pelvis W Wo  Contrast    Creatinine, serum          Will check CT scan to evaluate previous surgical repair given that this is a chronic pain without a clear inciting event    Health Maintenance reviewed, patient previously counseled on vaccines.    Follow-up: No follow-ups on file.  ______________________________________________________________________    Chief Complaint  Chief Complaint   Patient presents with    Groin Pain       HPI  Ron Christina is a 68 y.o. male with medical diagnoses as listed in the medical history and problem list that presents for bilateral groin pain times weeks to months.  Pt is known to me with their last appointment 8/11/2023.      Patient presents today complaining of bilateral inguinal pain.  The left side is worse than the right.  He has a history bilateral inguinal repair by outside Urology.  His urologist has recently retired.  He reports he later had a 2nd surgery to then use mesh to attempt to repair his hernia.  He has recently started to experience bilateral inguinal pain.  Denies any overt bulging or scrotal swelling.  No clear mechanism of injury.  The pain is generally tolerable without medications.  He is concerned about his mesh.      PAST MEDICAL HISTORY:  Past Medical History:   Diagnosis Date    Allergy     COVID-19 07/2021    presumed Delta strain    Deep vein thrombosis        PAST SURGICAL HISTORY:  Past Surgical History:   Procedure Laterality Date    HERNIA REPAIR         SOCIAL HISTORY:  Social History     Socioeconomic History    Marital status:     Tobacco Use    Smoking status: Never     Passive exposure: Never    Smokeless tobacco: Never   Substance and Sexual Activity    Alcohol use: Yes     Alcohol/week: 2.0 standard drinks of alcohol     Types: 1 Glasses of wine, 1 Shots of liquor per week     Comment: once a month    Drug use: No       FAMILY HISTORY:  Family History   Problem Relation Age of Onset    Alcohol abuse Mother     Asthma Father        ALLERGIES AND MEDICATIONS: updated and reviewed.  Review of patient's allergies indicates:   Allergen Reactions    Penicillins Other (See Comments)     No current outpatient medications on file.     No current facility-administered medications for this visit.         ROS  Review of Systems        Physical Exam  Vitals:    09/06/23 1123   BP: (!) 120/90   Pulse: 92   Temp: 98.3 °F (36.8 °C)   SpO2: 95%   Weight: 110.6 kg (243 lb 11.5 oz)   Height: 6' (1.829 m)    Body mass index is 33.05 kg/m².  Weight: 110.6 kg (243 lb 11.5 oz)   Height: 6' (182.9 cm)   Physical Exam  Constitutional:       General: He is not in acute distress.     Appearance: Normal appearance. He is well-developed.   HENT:      Head: Normocephalic and atraumatic.   Eyes:      Extraocular Movements: Extraocular movements intact.      Conjunctiva/sclera: Conjunctivae normal.   Pulmonary:      Effort: Pulmonary effort is normal. No respiratory distress.   Abdominal:      Hernia: There is no hernia in the left inguinal area or right inguinal area.   Genitourinary:     Penis: Normal.       Testes:         Right: Swelling not present.         Left: Swelling not present.      Comments: Chaperone declined    No hernia appreciated on exam today.  There is some tenderness to palpation on the left more than the right.  There is no erythema warmth or other skin changes  Musculoskeletal:      Cervical back: Normal range of motion.   Neurological:      General: No focal deficit present.      Mental Status: He is alert and oriented to person, place,  and time.   Psychiatric:         Mood and Affect: Mood and affect normal.         Behavior: Behavior normal.         Thought Content: Thought content normal.         Judgment: Judgment normal.             Health Maintenance         Date Due Completion Date    TETANUS VACCINE Never done ---    Hemoglobin A1c (Diabetic Prevention Screening) Never done ---    Shingles Vaccine (1 of 2) Never done ---    Pneumococcal Vaccines (Age 65+) (1 - PCV) Never done ---    Influenza Vaccine (1) 09/01/2023 12/17/2018    Colorectal Cancer Screening 01/02/2024 1/2/2023    PROSTATE-SPECIFIC ANTIGEN 03/17/2024 3/17/2023    Lipid Panel 03/17/2028 3/17/2023

## 2023-09-06 NOTE — PATIENT INSTRUCTIONS
Below is the number to schedule your CT.  Let them know that the order for the blood test is also in         Radiology [phone]

## 2023-10-02 ENCOUNTER — HOSPITAL ENCOUNTER (OUTPATIENT)
Dept: RADIOLOGY | Facility: HOSPITAL | Age: 68
Discharge: HOME | End: 2023-10-02
Attending: INTERNAL MEDICINE
Payer: MEDICARE

## 2023-10-02 DIAGNOSIS — K40.20 BILATERAL INGUINAL HERNIA WITHOUT OBSTRUCTION OR GANGRENE, RECURRENCE NOT SPECIFIED: ICD-10-CM

## 2023-10-02 DIAGNOSIS — Z87.19 S/P BILATERAL INGUINAL HERNIA REPAIR: ICD-10-CM

## 2023-10-02 DIAGNOSIS — R10.32 GROIN PAIN, LEFT: ICD-10-CM

## 2023-10-02 DIAGNOSIS — Z98.890 S/P BILATERAL INGUINAL HERNIA REPAIR: ICD-10-CM

## 2023-10-02 PROCEDURE — 72193 CT PELVIS W/DYE: CPT | Mod: 26,HCNC,, | Performed by: INTERNAL MEDICINE

## 2023-10-02 PROCEDURE — 72193 CT PELVIS W/DYE: CPT | Mod: TC,HCNC

## 2023-10-02 PROCEDURE — 25500020 PHARM REV CODE 255: Mod: HCNC | Performed by: INTERNAL MEDICINE

## 2023-10-02 PROCEDURE — 72193 CT PELVIS WITH  CONTRAST: ICD-10-PCS | Mod: 26,HCNC,, | Performed by: INTERNAL MEDICINE

## 2023-10-02 RX ADMIN — IOHEXOL 100 ML: 350 INJECTION, SOLUTION INTRAVENOUS at 11:10

## 2023-10-03 ENCOUNTER — HOSPITAL ENCOUNTER (EMERGENCY)
Facility: HOSPITAL | Age: 68
Discharge: HOME | End: 2023-10-03
Attending: EMERGENCY MEDICINE
Payer: MEDICARE

## 2023-10-03 VITALS
HEIGHT: 72 IN | WEIGHT: 240 LBS | BODY MASS INDEX: 32.51 KG/M2 | DIASTOLIC BLOOD PRESSURE: 79 MMHG | RESPIRATION RATE: 18 BRPM | OXYGEN SATURATION: 98 % | TEMPERATURE: 99 F | HEART RATE: 68 BPM | SYSTOLIC BLOOD PRESSURE: 159 MMHG

## 2023-10-03 DIAGNOSIS — I82.4Y1 ACUTE DEEP VEIN THROMBOSIS (DVT) OF PROXIMAL VEIN OF RIGHT LOWER EXTREMITY: ICD-10-CM

## 2023-10-03 DIAGNOSIS — M79.89 LEG SWELLING: Primary | ICD-10-CM

## 2023-10-03 LAB
ALBUMIN SERPL BCP-MCNC: 3.9 G/DL (ref 3.5–5.2)
ALP SERPL-CCNC: 59 U/L (ref 55–135)
ALT SERPL W/O P-5'-P-CCNC: 23 U/L (ref 10–44)
ANION GAP SERPL CALC-SCNC: 8 MMOL/L (ref 8–16)
AST SERPL-CCNC: 22 U/L (ref 10–40)
BASOPHILS # BLD AUTO: 0.07 K/UL (ref 0–0.2)
BASOPHILS NFR BLD: 0.8 % (ref 0–1.9)
BILIRUB SERPL-MCNC: 0.4 MG/DL (ref 0.1–1)
BNP SERPL-MCNC: 26 PG/ML (ref 0–99)
BUN SERPL-MCNC: 17 MG/DL (ref 8–23)
CALCIUM SERPL-MCNC: 9.1 MG/DL (ref 8.7–10.5)
CHLORIDE SERPL-SCNC: 106 MMOL/L (ref 95–110)
CO2 SERPL-SCNC: 23 MMOL/L (ref 23–29)
CREAT SERPL-MCNC: 0.9 MG/DL (ref 0.5–1.4)
DIFFERENTIAL METHOD: ABNORMAL
EOSINOPHIL # BLD AUTO: 0.3 K/UL (ref 0–0.5)
EOSINOPHIL NFR BLD: 3.5 % (ref 0–8)
ERYTHROCYTE [DISTWIDTH] IN BLOOD BY AUTOMATED COUNT: 12.7 % (ref 11.5–14.5)
EST. GFR  (NO RACE VARIABLE): >60 ML/MIN/1.73 M^2
GLUCOSE SERPL-MCNC: 101 MG/DL (ref 70–110)
HCT VFR BLD AUTO: 43 % (ref 40–54)
HGB BLD-MCNC: 14.7 G/DL (ref 14–18)
IMM GRANULOCYTES # BLD AUTO: 0.03 K/UL (ref 0–0.04)
IMM GRANULOCYTES NFR BLD AUTO: 0.4 % (ref 0–0.5)
LYMPHOCYTES # BLD AUTO: 2.6 K/UL (ref 1–4.8)
LYMPHOCYTES NFR BLD: 31.6 % (ref 18–48)
MCH RBC QN AUTO: 32.4 PG (ref 27–31)
MCHC RBC AUTO-ENTMCNC: 34.2 G/DL (ref 32–36)
MCV RBC AUTO: 95 FL (ref 82–98)
MONOCYTES # BLD AUTO: 0.7 K/UL (ref 0.3–1)
MONOCYTES NFR BLD: 8.5 % (ref 4–15)
NEUTROPHILS # BLD AUTO: 4.6 K/UL (ref 1.8–7.7)
NEUTROPHILS NFR BLD: 55.2 % (ref 38–73)
NRBC BLD-RTO: 0 /100 WBC
PLATELET # BLD AUTO: 193 K/UL (ref 150–450)
PMV BLD AUTO: 9.5 FL (ref 9.2–12.9)
POTASSIUM SERPL-SCNC: 4 MMOL/L (ref 3.5–5.1)
PROT SERPL-MCNC: 7.6 G/DL (ref 6–8.4)
RBC # BLD AUTO: 4.54 M/UL (ref 4.6–6.2)
SODIUM SERPL-SCNC: 137 MMOL/L (ref 136–145)
WBC # BLD AUTO: 8.24 K/UL (ref 3.9–12.7)

## 2023-10-03 PROCEDURE — 80053 COMPREHEN METABOLIC PANEL: CPT | Mod: HCNC | Performed by: EMERGENCY MEDICINE

## 2023-10-03 PROCEDURE — 96372 THER/PROPH/DIAG INJ SC/IM: CPT | Performed by: EMERGENCY MEDICINE

## 2023-10-03 PROCEDURE — 83880 ASSAY OF NATRIURETIC PEPTIDE: CPT | Mod: HCNC | Performed by: EMERGENCY MEDICINE

## 2023-10-03 PROCEDURE — 99285 EMERGENCY DEPT VISIT HI MDM: CPT | Mod: 25,HCNC

## 2023-10-03 PROCEDURE — 63600175 PHARM REV CODE 636 W HCPCS: Mod: HCNC | Performed by: EMERGENCY MEDICINE

## 2023-10-03 PROCEDURE — 85025 COMPLETE CBC W/AUTO DIFF WBC: CPT | Mod: HCNC | Performed by: EMERGENCY MEDICINE

## 2023-10-03 RX ORDER — ENOXAPARIN SODIUM 150 MG/ML
1 INJECTION SUBCUTANEOUS
Status: COMPLETED | OUTPATIENT
Start: 2023-10-03 | End: 2023-10-03

## 2023-10-03 RX ADMIN — ENOXAPARIN SODIUM 105 MG: 120 INJECTION SUBCUTANEOUS at 09:10

## 2023-10-03 NOTE — PROGRESS NOTES
I am happy to report your CT does not show any return of the hernias.    There was some arthritis noted in the low back.  It is possible this is the source of the pain that is radiating to the groin area.  Please let me know if routine over-the-counter medications are not working to keep his pain under control.    Sincerely,  Selvin Rae MD

## 2023-10-04 ENCOUNTER — PATIENT MESSAGE (OUTPATIENT)
Dept: FAMILY MEDICINE | Facility: CLINIC | Age: 68
End: 2023-10-04
Payer: MEDICARE

## 2023-10-04 NOTE — DISCHARGE INSTRUCTIONS
YOU MUST FOLLOW UP WITH YOUR DOCTOR BEFORE YOUR ELIKUSUMIS RUNS OUT. YOU WILL NEED A REFILL OF THIS MEDICINE    Thank you for coming to our Emergency Department today. It is important to remember that some problems or medical conditions are difficult to diagnose and may not be found or addressed during your Emergency Department visit.  These conditions often start with non-specific symptoms and can only be diagnosed on follow up visits with your primary care physician or specialist when the symptoms continue or change. Please remember that all medical conditions can change, and we cannot predict how you will be feeling tomorrow or the next day.    Return to the ER with any questions/concerns, new/concerning symptoms, worsening, failure to improve or inability to obtain follow-up.       Be sure to follow up with your primary care doctor and review all labs/imaging/tests that were performed during your ER visit with them. It is very common for us to identify non-emergent incidental findings which must be followed up with your primary care physician.  Some labs/imaging/tests may be outside of the normal range, and require non-emergent follow-up and/or further investigation/treatment/procedures/testing to help diagnose/exclude/prevent complications or other potentially serious medical conditions. Some abnormalities may not have been discussed or addressed during your ER visit. Some lab results may not return during your ER visit but can be accessible by downloading the free [facility] Mychart camila or by visiting [email or web address] . It is important for you to review all labs/imaging/tests which are outside of the normal range with your physician.    An ER visit does not replace a primary care visit, and many screening tests or follow-up tests cannot be ordered by an ER doctor or performed by the ER. Some tests may even require pre-approval.    If you do not have a primary care doctor, you may contact the one listed on  your discharge paperwork or you may also call the [facility] Appointment Desk at [phone] , or [facility] at  [phone] to schedule an appointment, or establish care with a primary care doctor or even a specialist and to obtain information about local resources. It is important to your health that you have a primary care doctor.    Please take all medications as directed. We have done our best to select a medication for you that will treat your condition however, all medications may potentially have side-effects and it is impossible to predict which medications may give you side-effects or what those side-effects (if any) those medications may give you.  If you feel that you are having a negative effect or side-effect of any medication you should stop taking those medications immediately and seek medical attention. If you feel that you are having a life-threatening reaction call 911.        Do not drive, swim, climb to height, take a bath, operate heavy machinery, drink alcohol or take potentially sedating medications, sign any legal documents or make any important decisions for 24 hours if you have received any pain medications, sedatives or mood altering drugs during your ER visit or within 24 hours of taking them if they have been prescribed to you.     You can find additional resources for Dentists, hearing aids, durable medical equipment, low cost pharmacies and other resources at [email or web address]

## 2023-10-04 NOTE — ED TRIAGE NOTES
Pt presents to ED co of R calf pain starting today. Pt believes it might be a blood clot as he has a HX (2017). Pt recently drove 16 hours. Pt denies taking any blood thinners or any other daily meds. Denies any SOB, CP, cough. No obvious swelling or redness to calf.

## 2023-10-04 NOTE — TELEPHONE ENCOUNTER
Call was placed to patient and virtual appointment scheduled for ER follow up 10-4-23. Verbalized understanding and thank you.

## 2023-10-04 NOTE — ED PROVIDER NOTES
Encounter Date: 10/3/2023       History     Chief Complaint   Patient presents with    Leg Swelling     Pt reports leg pain and swelling starting this am. Reports hx of blood clot in 2017. Reports recent 16 hour car ride to and from [place]. Denies shortness of breath.      68 y.o. male Past Medical History:  No date: Allergy  07/2021: COVID-19      Comment:  presumed Delta strain  No date: Deep vein thrombosis     Pt notes distant pmh of DVT after which he was anticoag for 6 months. States that over the past few days he has had 2 eight hour drives to [place], notes he awoke with R calf pain/swelling which feels like his prior DVT.      Review of patient's allergies indicates:   Allergen Reactions    Penicillins Other (See Comments)     Past Medical History:   Diagnosis Date    Allergy     COVID-19 07/2021    presumed Delta strain    Deep vein thrombosis      Past Surgical History:   Procedure Laterality Date    HERNIA REPAIR       Family History   Problem Relation Age of Onset    Alcohol abuse Mother     Asthma Father      Social History     Tobacco Use    Smoking status: Never     Passive exposure: Never    Smokeless tobacco: Never   Substance Use Topics    Alcohol use: Yes     Alcohol/week: 2.0 standard drinks of alcohol     Types: 1 Glasses of wine, 1 Shots of liquor per week     Comment: once a month    Drug use: No     Review of Systems   Constitutional:  Negative for fever.   HENT:  Negative for sore throat.    Respiratory:  Negative for shortness of breath.    Cardiovascular:  Negative for chest pain.   Gastrointestinal:  Negative for nausea.   Genitourinary:  Negative for dysuria.   Musculoskeletal:  Negative for back pain.   Skin:  Negative for rash.   Neurological:  Negative for weakness.   Hematological:  Does not bruise/bleed easily.   All other systems reviewed and are negative.      Physical Exam     Initial Vitals [10/03/23 1840]   BP Pulse Resp Temp SpO2   (!) 159/89 74 18 99.4 °F (37.4 °C) 97 %       MAP       --         Physical Exam    Nursing note and vitals reviewed.  Constitutional: He appears well-developed and well-nourished.   HENT:   Head: Normocephalic and atraumatic.   Eyes: EOM are normal. Pupils are equal, round, and reactive to light.   Cardiovascular:  Normal rate and regular rhythm.           Pulmonary/Chest: Effort normal.   Abdominal: He exhibits no distension.   Musculoskeletal:         General: No tenderness or edema.     Neurological: He is alert and oriented to person, place, and time. No cranial nerve deficit.   Skin: Skin is warm and dry.   Psychiatric: He has a normal mood and affect.     RLE>LLE swelling, +calf ttp    ED Course   Procedures  MEDICAL DECISION MAKING    After review of the patient's physical exam, ED testing, and history/symptoms, relevant labs, imaging, available outside records  a wide differential was considered including but not limited to: infectious, traumatic, vascular, toxicological , metabolic, malignant, ischemic, embolic, psychological, genetic, iatrogenic, idiopathic, medication reaction, environmental exposure, substance dependence/intoxication/withdrawal, electrolyte abnormality, blood dyscrasia, autoimmune and other etiologies.        labs/imaging/interventions include:       Medications   enoxaparin injection 105 mg (has no administration in time range)     Labs Reviewed   CBC W/ AUTO DIFFERENTIAL - Abnormal; Notable for the following components:       Result Value    RBC 4.54 (*)     MCH 32.4 (*)     All other components within normal limits   COMPREHENSIVE METABOLIC PANEL   B-TYPE NATRIURETIC PEPTIDE      US Lower Extremity Veins Right   Final Result   Abnormal      Thrombosis seen within right posterior tibial and peroneal veins.  No evidence of more proximal right lower extremity deep venous thrombosis.      This report was flagged in Epic as abnormal.         Electronically signed by: Soledad Ly MD   Date:    10/03/2023   Time:    21:11           Labs Reviewed   CBC W/ AUTO DIFFERENTIAL - Abnormal; Notable for the following components:       Result Value    RBC 4.54 (*)     MCH 32.4 (*)     All other components within normal limits   COMPREHENSIVE METABOLIC PANEL   B-TYPE NATRIURETIC PEPTIDE          Imaging Results               US Lower Extremity Veins Right (Final result)  Result time 10/03/23 21:11:46      Final result by Soledad Ly MD (10/03/23 21:11:46)                   Impression:      Thrombosis seen within right posterior tibial and peroneal veins.  No evidence of more proximal right lower extremity deep venous thrombosis.    This report was flagged in Epic as abnormal.      Electronically signed by: Soledad Ly MD  Date:    10/03/2023  Time:    21:11               Narrative:    EXAMINATION:  US LOWER EXTREMITY VEINS RIGHT    CLINICAL HISTORY:  Other specified soft tissue disorders    TECHNIQUE:  Duplex and color flow Doppler evaluation of the right lower extremity veins was performed.    COMPARISON:  November 2017.    FINDINGS:  Thrombosis is seen within one of the paired right posterior tibial and peroneal veins.  No evidence of clot involving the bilateral common femoral veins or right greater saphenous, femoral, popliteal, or anterior tibial veins.  All venous structures demonstrate normal respiratory phasicity and augment adequately.  No evidence of soft tissue mass or Baker's cyst.                                       Medications   enoxaparin injection 105 mg (has no administration in time range)     Medical Decision Making  Amount and/or Complexity of Data Reviewed  Labs: ordered.    Risk  Prescription drug management.    Pt has a dvt. Have given a dose of lovenox, referred to vascular. Started on eliquis.           I have independently evaluated and interpreted all available labs and imaging to the extent of the scope of my practice.  The suspected diagnosis, treatment and plan were discussed with the patient. All  questions or concerns have been addressed.    Note was created using voice recognition software. Note may have occasional typographical or grammatical errors , garbled syntax, and other bizarre constructions that may not have been identified and edited despite good jake initial review prior to signing.                Clinical Impression:   Final diagnoses:  [M79.89] Leg swelling (Primary)  [I82.4Y1] Acute deep vein thrombosis (DVT) of proximal vein of right lower extremity               Porsha Hairston MD  10/03/23 7987

## 2023-10-05 NOTE — PROGRESS NOTES
This note was created by combination of typed  and M-Modal dictation.  Transcription errors may be present.  If there are any questions, please contact me.    Assessment and Plan:   Assessment and Plan   Acute deep vein thrombosis (DVT) of right peroneal vein  -his 2nd DVT.  The 1st time, circumstances were possible leg injury coupled with long plane ride, and this time around long car ride coupled with possible leg injury.  It is his 2nd DVT.  Tolerating the Eliquis, denies any swelling, feels like it actually gets better with physical activity, ED had referred him to vascular surgery, does not sound like he needs evaluation for thrombectomy so he does not have to see vascular   Suggested that given this is his 2nd DVT that he have lifelong anticoagulation but he is a bit reluctant.  Will have him see Hematology for evaluation and recommendations regarding anticoagulation therapy   But at least 3 months duration.  He is already got the 1st month so I sent in to additional refills.  Okay to resume physical activity  -     apixaban (ELIQUIS) 5 mg Tab; Take 1 tablet (5 mg total) by mouth 2 (two) times daily.  Dispense: 60 tablet; Refill: 1  -     Ambulatory referral/consult to Hematology / Oncology; Future; Expected date: 10/13/2023       Medications Discontinued During This Encounter   Medication Reason    apixaban (ELIQUIS) 5 mg Tab Reorder       meds sent this encounter:     Medications Ordered This Encounter   Medications    apixaban (ELIQUIS) 5 mg Tab     Sig: Take 1 tablet (5 mg total) by mouth 2 (two) times daily.     Dispense:  60 tablet     Refill:  1        Follow Up:   Future Appointments   Date Time Provider Department Center   10/6/2023 10:00 AM Eliud Sierra MD [facility]   3/15/2024  7:00 AM [facility]   3/18/2024  9:20 AM Selvin Rae MD [facility] [place]          Subjective:   Subjective   Chief Complaint   Patient presents with    Deep Vein  Thrombosis       HPI  Ron is a 68 y.o. male.     Social History     Tobacco Use    Smoking status: Never     Passive exposure: Never    Smokeless tobacco: Never   Substance Use Topics    Alcohol use: Yes     Alcohol/week: 2.0 standard drinks of alcohol     Types: 1 Glasses of wine, 1 Shots of liquor per week     Comment: once a month          Social History     Social History Narrative    Not on file     The chief complaint leading to consultation is: DVT  Visit type: Virtual visit with synchronous audio and video  Total time spent with patient: 15 minutes  Each patient to whom he or she provides medical services by telemedicine is:  (1) informed of the relationship between the physician and patient and the respective role of any other health care provider with respect to management of the patient; and (2) notified that he or she may decline to receive medical services by telemedicine and may withdraw from such care at any time.    Notes:  Last appointment with this clinic was 9/6/2023. Last visit with me Visit date not found   To summarize last visit and events leading up to today:  History of DVT 2017.  ED 10/3 for leg swelling after prolonged car ride.  Right leg ultrasound  Thrombosis seen within right posterior tibial and peroneal veins. No evidence of more proximal right lower extremity deep venous thrombosis.    Previous episode of right leg DVT 06/20/2017 after a cruise and a 2 hour plane ride  Thrombophilia workup at that time not indicated.    Saw his PCP 09/06/2023 for bilateral inguinal pain left worse than right.  History of bilateral inguinal hernia repair.  Plan was CT imaging  Hyperlipidemia    Today's visit:    Presents today in follow-up after going to the ED for diagnosis of right leg DVT.  Circumstances-he was driving to see his son in [place], an 8 hour drive.  He was helping his son do some work on the hot tub, the patient took a step back and tripped on a trench in the ground, and felt  like he really hurt his leg.    Subsequently developed pain and went to the ED and was diagnosed with DVT.    He recalls that the ultrasound tech who performed it actually was seen 1 who did his ultrasound back in 2017 and reports that she showed him the area of the previous DVT and that the current DVT was in a slightly different location.  When he 1st presented with DVT was having some swelling and pain maybe 5/10.    The pain is gone down to 1/10, and he is no longer having swelling.    He was cautioned about not returning to work.  He has been walking some in finds that the walking actually makes him feel better, no associated claudication symptoms, no associated swelling, no bruising no ulceration.  No chest pain or shortness a breath no palpitations.  He is taking the Eliquis, denies obvious side effects of the medication.    This is his 2nd episode of DVT.  The 1st episode he recalls that he was mowing the lawn, tripped in a hole and hurt his right leg then.  Then he went on a cruise and then took a 4 hour plane ride after the cruise to [place].  He was seen by Hematology after 3 months of therapy, and was not recommended to have chronic anticoagulation  No family history of DVT/PE    Discussed with him that given this is his 2nd episode, may need to have lifelong anticoagulation.  He is reluctant to pursue that and so I am going to have him see Hematology about referrals for duration of anticoagulation    He takes an Advil p.m. on weekends to help him sleep.  Discussed that if he is not having pain, then he can stop the Advil portion and just take Benadryl.  Avoid NSAIDs.    Answers submitted by the patient for this visit:  Review of Systems Questionnaire (Submitted on 10/5/2023)  activity change: Yes  unexpected weight change: No  neck pain: No  hearing loss: No  rhinorrhea: No  trouble swallowing: No  eye discharge: No  visual disturbance: No  chest tightness: No  wheezing: No  chest pain:  No  palpitations: No  blood in stool: No  constipation: No  vomiting: No  diarrhea: No  polydipsia: No  polyuria: No  difficulty urinating: No  urgency: No  hematuria: No  joint swelling: No  arthralgias: No  headaches: No  weakness: No  confusion: No  dysphoric mood: No      Patient Care Team:  Selvin Rae MD as PCP - General (Internal Medicine)  Aylin Shaw LPN as Licensed Practical Nurse    Patient Active Problem List    Diagnosis Date Noted    Umbilical hernia without obstruction and without gangrene 11/22/2019     Small.  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 ENT recommended excision.  Patient prefers not to have surgery.       Other hyperlipidemia 02/13/2014     ASCVD risk score indicates need for statin.  I have counseled the patient on this risk factor as well also recommendation for statin use for reduction of heart attack and stroke risk.  He has verbally acknowledged this information and wishes NOT to pursue statins.  He is aware that this decision leaves him untreated for significant cardiovascular risk      Obesity (BMI 30.0-34.9) 02/13/2014       PAST MEDICAL PROBLEMS, PAST SURGICAL HISTORY: please see relevant portions of the electronic medical record    ALLERGIES AND MEDICATIONS: updated and reviewed.  Medication List with Changes/Refills   Current Medications    APIXABAN (ELIQUIS) 5 MG TAB    Take 1 tablet (5 mg total) by mouth 2 (two) times daily.      Review of Systems   HENT:  Negative for hearing loss.    Eyes:  Negative for discharge.   Respiratory:  Negative for wheezing.    Cardiovascular:  Negative for chest pain and palpitations.   Gastrointestinal:  Negative for blood in stool, constipation, diarrhea and vomiting.   Genitourinary:  Negative for hematuria and urgency.   Musculoskeletal:  Negative for neck pain.   Neurological:   Negative for weakness and headaches.   Endo/Heme/Allergies:  Negative for polydipsia.          Objective:   Objective   Physical Exam   There were no vitals filed for this visit. There is no height or weight on file to calculate BMI.            Physical Exam  Constitutional:       General: He is not in acute distress.     Appearance: Normal appearance. He is not ill-appearing.   HENT:      Head: Normocephalic.   Pulmonary:      Effort: Pulmonary effort is normal.   Neurological:      General: No focal deficit present.      Mental Status: He is alert.   Psychiatric:         Mood and Affect: Mood normal.         Behavior: Behavior normal.         Thought Content: Thought content normal.         Judgment: Judgment normal.

## 2023-10-06 ENCOUNTER — OFFICE VISIT (OUTPATIENT)
Dept: FAMILY MEDICINE | Facility: CLINIC | Age: 68
End: 2023-10-06
Payer: MEDICARE

## 2023-10-06 DIAGNOSIS — I82.451 ACUTE DEEP VEIN THROMBOSIS (DVT) OF RIGHT PERONEAL VEIN: Primary | ICD-10-CM

## 2023-10-06 PROCEDURE — 1159F PR MEDICATION LIST DOCUMENTED IN MEDICAL RECORD: ICD-10-PCS | Mod: HCNC,CPTII,95, | Performed by: INTERNAL MEDICINE

## 2023-10-06 PROCEDURE — 99214 OFFICE O/P EST MOD 30 MIN: CPT | Mod: HCNC,95,, | Performed by: INTERNAL MEDICINE

## 2023-10-06 PROCEDURE — 1160F PR REVIEW ALL MEDS BY PRESCRIBER/CLIN PHARMACIST DOCUMENTED: ICD-10-PCS | Mod: HCNC,CPTII,95, | Performed by: INTERNAL MEDICINE

## 2023-10-06 PROCEDURE — 1160F RVW MEDS BY RX/DR IN RCRD: CPT | Mod: HCNC,CPTII,95, | Performed by: INTERNAL MEDICINE

## 2023-10-06 PROCEDURE — 99214 PR OFFICE/OUTPT VISIT, EST, LEVL IV, 30-39 MIN: ICD-10-PCS | Mod: HCNC,95,, | Performed by: INTERNAL MEDICINE

## 2023-10-06 PROCEDURE — 1159F MED LIST DOCD IN RCRD: CPT | Mod: HCNC,CPTII,95, | Performed by: INTERNAL MEDICINE

## 2023-10-09 ENCOUNTER — LAB VISIT (OUTPATIENT)
Dept: LAB | Facility: HOSPITAL | Age: 68
End: 2023-10-09
Payer: MEDICARE

## 2023-10-09 ENCOUNTER — OFFICE VISIT (OUTPATIENT)
Dept: HEMATOLOGY/ONCOLOGY | Facility: CLINIC | Age: 68
End: 2023-10-09
Payer: MEDICARE

## 2023-10-09 VITALS
OXYGEN SATURATION: 95 % | DIASTOLIC BLOOD PRESSURE: 89 MMHG | SYSTOLIC BLOOD PRESSURE: 155 MMHG | WEIGHT: 243.63 LBS | TEMPERATURE: 99 F | HEIGHT: 72 IN | HEART RATE: 78 BPM | BODY MASS INDEX: 33 KG/M2 | RESPIRATION RATE: 17 BRPM

## 2023-10-09 DIAGNOSIS — I82.451 ACUTE DEEP VEIN THROMBOSIS (DVT) OF RIGHT PERONEAL VEIN: ICD-10-CM

## 2023-10-09 DIAGNOSIS — I82.451 ACUTE DEEP VEIN THROMBOSIS (DVT) OF RIGHT PERONEAL VEIN: Primary | ICD-10-CM

## 2023-10-09 PROCEDURE — 99205 PR OFFICE/OUTPT VISIT, NEW, LEVL V, 60-74 MIN: ICD-10-PCS | Mod: HCNC,GC,S$GLB, | Performed by: STUDENT IN AN ORGANIZED HEALTH CARE EDUCATION/TRAINING PROGRAM

## 2023-10-09 PROCEDURE — 3079F DIAST BP 80-89 MM HG: CPT | Mod: HCNC,CPTII,GC,S$GLB | Performed by: STUDENT IN AN ORGANIZED HEALTH CARE EDUCATION/TRAINING PROGRAM

## 2023-10-09 PROCEDURE — 86146 BETA-2 GLYCOPROTEIN ANTIBODY: CPT | Mod: HCNC | Performed by: STUDENT IN AN ORGANIZED HEALTH CARE EDUCATION/TRAINING PROGRAM

## 2023-10-09 PROCEDURE — 1126F AMNT PAIN NOTED NONE PRSNT: CPT | Mod: HCNC,CPTII,GC,S$GLB | Performed by: STUDENT IN AN ORGANIZED HEALTH CARE EDUCATION/TRAINING PROGRAM

## 2023-10-09 PROCEDURE — 1159F MED LIST DOCD IN RCRD: CPT | Mod: HCNC,CPTII,GC,S$GLB | Performed by: STUDENT IN AN ORGANIZED HEALTH CARE EDUCATION/TRAINING PROGRAM

## 2023-10-09 PROCEDURE — 85220 BLOOC CLOT FACTOR V TEST: CPT | Mod: HCNC | Performed by: STUDENT IN AN ORGANIZED HEALTH CARE EDUCATION/TRAINING PROGRAM

## 2023-10-09 PROCEDURE — 3008F PR BODY MASS INDEX (BMI) DOCUMENTED: ICD-10-PCS | Mod: HCNC,CPTII,GC,S$GLB | Performed by: STUDENT IN AN ORGANIZED HEALTH CARE EDUCATION/TRAINING PROGRAM

## 2023-10-09 PROCEDURE — 3079F PR MOST RECENT DIASTOLIC BLOOD PRESSURE 80-89 MM HG: ICD-10-PCS | Mod: HCNC,CPTII,GC,S$GLB | Performed by: STUDENT IN AN ORGANIZED HEALTH CARE EDUCATION/TRAINING PROGRAM

## 2023-10-09 PROCEDURE — 99999 PR PBB SHADOW E&M-EST. PATIENT-LVL III: ICD-10-PCS | Mod: PBBFAC,HCNC,GC, | Performed by: STUDENT IN AN ORGANIZED HEALTH CARE EDUCATION/TRAINING PROGRAM

## 2023-10-09 PROCEDURE — 3077F SYST BP >= 140 MM HG: CPT | Mod: HCNC,CPTII,GC,S$GLB | Performed by: STUDENT IN AN ORGANIZED HEALTH CARE EDUCATION/TRAINING PROGRAM

## 2023-10-09 PROCEDURE — 3077F PR MOST RECENT SYSTOLIC BLOOD PRESSURE >= 140 MM HG: ICD-10-PCS | Mod: HCNC,CPTII,GC,S$GLB | Performed by: STUDENT IN AN ORGANIZED HEALTH CARE EDUCATION/TRAINING PROGRAM

## 2023-10-09 PROCEDURE — 99205 OFFICE O/P NEW HI 60 MIN: CPT | Mod: HCNC,GC,S$GLB, | Performed by: STUDENT IN AN ORGANIZED HEALTH CARE EDUCATION/TRAINING PROGRAM

## 2023-10-09 PROCEDURE — 36415 COLL VENOUS BLD VENIPUNCTURE: CPT | Mod: HCNC | Performed by: STUDENT IN AN ORGANIZED HEALTH CARE EDUCATION/TRAINING PROGRAM

## 2023-10-09 PROCEDURE — 1126F PR PAIN SEVERITY QUANTIFIED, NO PAIN PRESENT: ICD-10-PCS | Mod: HCNC,CPTII,GC,S$GLB | Performed by: STUDENT IN AN ORGANIZED HEALTH CARE EDUCATION/TRAINING PROGRAM

## 2023-10-09 PROCEDURE — 3008F BODY MASS INDEX DOCD: CPT | Mod: HCNC,CPTII,GC,S$GLB | Performed by: STUDENT IN AN ORGANIZED HEALTH CARE EDUCATION/TRAINING PROGRAM

## 2023-10-09 PROCEDURE — 86147 CARDIOLIPIN ANTIBODY EA IG: CPT | Mod: 59,HCNC | Performed by: STUDENT IN AN ORGANIZED HEALTH CARE EDUCATION/TRAINING PROGRAM

## 2023-10-09 PROCEDURE — 99999 PR PBB SHADOW E&M-EST. PATIENT-LVL III: CPT | Mod: PBBFAC,HCNC,GC, | Performed by: STUDENT IN AN ORGANIZED HEALTH CARE EDUCATION/TRAINING PROGRAM

## 2023-10-09 PROCEDURE — 1159F PR MEDICATION LIST DOCUMENTED IN MEDICAL RECORD: ICD-10-PCS | Mod: HCNC,CPTII,GC,S$GLB | Performed by: STUDENT IN AN ORGANIZED HEALTH CARE EDUCATION/TRAINING PROGRAM

## 2023-10-09 NOTE — PROGRESS NOTES
HEMATOLOGY ONCOL[facility]Y [facility]LLOW [facility]    PATIENT: Ron Christina  MRN: [id number]  DATE: 10/9/2023    Visit Diagnosis: Acute deep vein thrombosis (DVT) of right peroneal vein [I82.451]      Subjective:      HPI:   Mr. Christina is a 68 y.o. male who presents for evaluation/management of a newly diagnosed DVT, this is his second DVT. His first DVT was in the right posterior tibial vein in 2017 and was attributed to a possible leg injury and travel (plain and cruise). He completed three months of apixaban and had no other clots until last week. He was visiting his son in [place] (8-hour drive) and was working on a hot tub when he stepped into a trench and hurt his right leg. He then drove back to [place] and went to the ED four days after the injury and was diagnosed with a right posterior tibial DVT. He had an U/S after treatment of his first DVT and there was no evidence of a thrombus after treatment. He reports multiple family members have had blood clots, most seem to have had a provoking incident.     Past Medical History:   Past Medical History:   Diagnosis Date    Allergy     COVID-19 07/2021    presumed Delta strain    Deep vein thrombosis        Past Surgical HIstory:   Past Surgical History:   Procedure Laterality Date    HERNIA REPAIR         Family History:   Family History   Problem Relation Age of Onset    Alcohol abuse Mother     Asthma Father        Social History:  reports that he has never smoked. He has never been exposed to tobacco smoke. He has never used smokeless tobacco. He reports current alcohol use of about 2.0 standard drinks of alcohol per week. He reports that he does not use drugs.    Allergies:  Review of patient's allergies indicates:   Allergen Reactions    Penicillins Other (See Comments)       Medications:  Current Outpatient Medications   Medication Sig Dispense Refill    apixaban (ELIQUIS) 5 mg Tab Take 1 tablet (5 mg total) by mouth 2 (two) times daily. 60 tablet 1     No current  facility-administered medications for this visit.       Review of Systems   Constitutional:  Negative for chills and fever.   HENT:  Negative for congestion and sore throat.    Eyes:  Negative for pain.   Respiratory:  Negative for cough and shortness of breath.    Cardiovascular:  Negative for chest pain, palpitations and leg swelling.   Gastrointestinal:  Negative for abdominal pain, constipation, diarrhea, nausea and vomiting.   Genitourinary:  Negative for difficulty urinating, dysuria and hematuria.   Musculoskeletal:  Negative for back pain.   Skin:  Negative for rash.   Neurological:  Negative for light-headedness and headaches.   Hematological:  Does not bruise/bleed easily.   Psychiatric/Behavioral:  Negative for agitation.        Objective:      Vitals:   Vitals:    10/09/23 1508   BP: (!) 155/89   BP Location: Left arm   Patient Position: Sitting   BP Method: Large (Automatic)   Pulse: 78   Resp: 17   Temp: 98.6 °F (37 °C)   SpO2: 95%   Weight: 110.5 kg (243 lb 9.7 oz)   Height: 6' (1.829 m)       Physical Exam  Constitutional:       Appearance: Normal appearance.   HENT:      Head: Normocephalic and atraumatic.      Mouth/Throat:      Mouth: Mucous membranes are moist.      Pharynx: Oropharynx is clear.   Eyes:      Extraocular Movements: Extraocular movements intact.      Pupils: Pupils are equal, round, and reactive to light.   Cardiovascular:      Rate and Rhythm: Normal rate and regular rhythm.      Pulses: Normal pulses.      Heart sounds: Normal heart sounds.   Pulmonary:      Effort: Pulmonary effort is normal.      Breath sounds: Normal breath sounds.   Abdominal:      General: Abdomen is flat.      Palpations: Abdomen is soft.      Tenderness: There is no abdominal tenderness.   Musculoskeletal:         General: Swelling (Right lower leg slightly more swollen than left) present. Normal range of motion.      Cervical back: Normal range of motion and neck supple.      Right lower leg: No edema.       Left lower leg: No edema.   Skin:     General: Skin is warm and dry.   Neurological:      General: No focal deficit present.      Mental Status: He is alert and oriented to person, place, and time.   Psychiatric:         Mood and Affect: Mood normal.         Behavior: Behavior normal.         Laboratory Data:  Admission on 10/03/2023, Discharged on 10/03/2023   Component Date Value Ref Range Status    WBC 10/03/2023 8.24  3.90 - 12.70 K/uL Final    RBC 10/03/2023 4.54 (L)  4.60 - 6.20 M/uL Final    Hemoglobin 10/03/2023 14.7  14.0 - 18.0 g/dL Final    Hematocrit 10/03/2023 43.0  40.0 - 54.0 % Final    MCV 10/03/2023 95  82 - 98 fL Final    MCH 10/03/2023 32.4 (H)  27.0 - 31.0 pg Final    MCHC 10/03/2023 34.2  32.0 - 36.0 g/dL Final    RDW 10/03/2023 12.7  11.5 - 14.5 % Final    Platelets 10/03/2023 193  150 - 450 K/uL Final    MPV 10/03/2023 9.5  9.2 - 12.9 fL Final    Immature Granulocytes 10/03/2023 0.4  0.0 - 0.5 % Final    Gran # (ANC) 10/03/2023 4.6  1.8 - 7.7 K/uL Final    Immature Grans (Abs) 10/03/2023 0.03  0.00 - 0.04 K/uL Final    Comment: Mild elevation in immature granulocytes is non specific and   can be seen in a variety of conditions including stress response,   acute inflammation, trauma and pregnancy. Correlation with other   laboratory and clinical findings is essential.      Lymph # 10/03/2023 2.6  1.0 - 4.8 K/uL Final    Mono # 10/03/2023 0.7  0.3 - 1.0 K/uL Final    Eos # 10/03/2023 0.3  0.0 - 0.5 K/uL Final    Baso # 10/03/2023 0.07  0.00 - 0.20 K/uL Final    nRBC 10/03/2023 0  0 /100 WBC Final    Gran % 10/03/2023 55.2  38.0 - 73.0 % Final    Lymph % 10/03/2023 31.6  18.0 - 48.0 % Final    Mono % 10/03/2023 8.5  4.0 - 15.0 % Final    Eosinophil % 10/03/2023 3.5  0.0 - 8.0 % Final    Basophil % 10/03/2023 0.8  0.0 - 1.9 % Final    Differential Method 10/03/2023 Automated   Final    Sodium 10/03/2023 137  136 - 145 mmol/L Final    Potassium 10/03/2023 4.0  3.5 - 5.1 mmol/L Final    Chloride  10/03/2023 106  95 - 110 mmol/L Final    CO2 10/03/2023 23  23 - 29 mmol/L Final    Glucose 10/03/2023 101  70 - 110 mg/dL Final    BUN 10/03/2023 17  8 - 23 mg/dL Final    Creatinine 10/03/2023 0.9  0.5 - 1.4 mg/dL Final    Calcium 10/03/2023 9.1  8.7 - 10.5 mg/dL Final    Total Protein 10/03/2023 7.6  6.0 - 8.4 g/dL Final    Albumin 10/03/2023 3.9  3.5 - 5.2 g/dL Final    Total Bilirubin 10/03/2023 0.4  0.1 - 1.0 mg/dL Final    Comment: For infants and newborns, interpretation of results should be based  on gestational age, weight and in agreement with clinical  observations.    Premature Infant recommended reference ranges:  Up to 24 hours.............<8.0 mg/dL  Up to 48 hours............<12.0 mg/dL  3-5 days..................<15.0 mg/dL  6-29 days.................<15.0 mg/dL      Alkaline Phosphatase 10/03/2023 59  55 - 135 U/L Final    AST 10/03/2023 22  10 - 40 U/L Final    ALT 10/03/2023 23  10 - 44 U/L Final    eGFR 10/03/2023 >60  >60 mL/min/1.73 m^2 Final    Anion Gap 10/03/2023 8  8 - 16 mmol/L Final    BNP 10/03/2023 26  0 - 99 pg/mL Final    Values of less than 100 pg/ml are consistent with non-CHF populations.          Assessment:       1. Acute deep vein thrombosis (DVT) of right peroneal vein           Plan:   Patient here with his second DVT. First one was in 2017 and likely provoked by travel and a leg injury. Was on apixaban for 6-months and post-treatment U/S revealed no evidence of thrombus. Was diagnosed with his second DVT on 10/3/23. He had two long car rides prior to this recent DVT and a leg injury as well. He stopped multiple times on his car ride so that in itself unlikely to cause the DVT but he may have been more prone due to his leg injury.     - Check beta-2 glycoprotein and cardiolipin antibody to confirm DOAC appropriate   - Check Factor V assay given family history of blood clots   - Continue apixaban 5mg BID for 3-months   - Given this is his second DVT and there is a family  history of blood clots he would benefit from continued treatment at half dose after the 3-months   - Follow-up in 3-months to discuss continuing treatment at a lower dose     Werner Roberts MD  Hematology and Oncology Fellow, PGY IV     Route Chart for Scheduling    BMT Chart Routing      Follow up with physician 3 months. Follow-up in 3-months   Follow up with LAURA    Provider visit type    Infusion scheduling note    Injection scheduling note    Labs   Scheduling:  Preferred lab:  Lab interval:  Factor V, beta-2 glycoprotein, anticardiolipid   Imaging    Pharmacy appointment    Other referrals

## 2023-10-10 LAB — FACT V ACT/NOR PPP: 136 % (ref 60–145)

## 2023-10-12 LAB
B2 GLYCOPROT1 IGA SER QL: 3.2 U/ML
B2 GLYCOPROT1 IGG SER QL: 1.1 U/ML
B2 GLYCOPROT1 IGM SER QL: <2.4 U/ML
CARDIOLIPIN IGG SER IA-ACNC: <9.4 GPL (ref 0–14.99)
CARDIOLIPIN IGM SER IA-ACNC: <9.4 MPL (ref 0–12.49)

## 2023-10-18 ENCOUNTER — PATIENT MESSAGE (OUTPATIENT)
Dept: HEMATOLOGY/ONCOLOGY | Facility: CLINIC | Age: 68
End: 2023-10-18
Payer: MEDICARE

## 2023-11-22 ENCOUNTER — PATIENT MESSAGE (OUTPATIENT)
Dept: FAMILY MEDICINE | Facility: CLINIC | Age: 68
End: 2023-11-22
Payer: MEDICARE

## 2023-11-27 ENCOUNTER — OFFICE VISIT (OUTPATIENT)
Dept: URGENT CARE | Facility: CLINIC | Age: 68
End: 2023-11-27
Payer: MEDICARE

## 2023-11-27 VITALS
HEIGHT: 72 IN | RESPIRATION RATE: 18 BRPM | BODY MASS INDEX: 33 KG/M2 | SYSTOLIC BLOOD PRESSURE: 127 MMHG | TEMPERATURE: 98 F | OXYGEN SATURATION: 95 % | HEART RATE: 74 BPM | DIASTOLIC BLOOD PRESSURE: 81 MMHG | WEIGHT: 243.63 LBS

## 2023-11-27 DIAGNOSIS — J01.90 ACUTE SINUSITIS, RECURRENCE NOT SPECIFIED, UNSPECIFIED LOCATION: Primary | ICD-10-CM

## 2023-11-27 DIAGNOSIS — R05.9 COUGH, UNSPECIFIED TYPE: ICD-10-CM

## 2023-11-27 PROCEDURE — 99213 OFFICE O/P EST LOW 20 MIN: CPT | Mod: S$GLB,,,

## 2023-11-27 RX ORDER — PREDNISONE 20 MG/1
40 TABLET ORAL DAILY
Qty: 10 TABLET | Refills: 0 | Status: SHIPPED | OUTPATIENT
Start: 2023-11-27 | End: 2023-12-02

## 2023-11-27 RX ORDER — FLUTICASONE PROPIONATE 50 MCG
1 SPRAY, SUSPENSION (ML) NASAL DAILY
Qty: 16 G | Refills: 0 | Status: SHIPPED | OUTPATIENT
Start: 2023-11-27

## 2023-11-27 RX ORDER — BENZONATATE 200 MG/1
200 CAPSULE ORAL 3 TIMES DAILY PRN
Qty: 30 CAPSULE | Refills: 0 | Status: SHIPPED | OUTPATIENT
Start: 2023-11-27 | End: 2023-12-07

## 2023-11-28 NOTE — PROGRESS NOTES
Subjective:      Patient ID: Ron Christina is a 68 y.o. male.    Vitals:  height is 6' (1.829 m) and weight is 110.5 kg (243 lb 9.7 oz). His oral temperature is 98.4 °F (36.9 °C). His blood pressure is 127/81 and his pulse is 74. His respiration is 18 and oxygen saturation is 95%.     Chief Complaint: Sinus Problem    Patient is a 68-year-old male presenting with coughing, sinus pressure, congestion for the past 5 days.  Denies fever, chills, chest pain, SOB, ear pain, dizziness, nausea, vomiting, abdominal pain.  Declined COVID testing today.    Sinus Problem  This is a new problem. The current episode started in the past 7 days. The problem has been gradually worsening since onset. There has been no fever. He is experiencing no pain. Associated symptoms include congestion, coughing, headaches, sinus pressure and sneezing. Pertinent negatives include no chills, ear pain, neck pain or shortness of breath. Past treatments include oral decongestants (Mucinex day and night, Nyquil, Tylenol sinus). The treatment provided mild relief.       Constitution: Negative for chills and fever.   HENT:  Positive for congestion and sinus pressure. Negative for ear pain.    Neck: Negative for neck pain.   Cardiovascular:  Negative for chest pain.   Respiratory:  Positive for cough. Negative for shortness of breath.    Gastrointestinal:  Negative for abdominal pain, nausea, vomiting and diarrhea.   Genitourinary:  Negative for dysuria.   Musculoskeletal:  Negative for muscle ache.   Skin:  Negative for rash.   Allergic/Immunologic: Positive for sneezing.   Neurological:  Positive for headaches. Negative for dizziness.      Objective:     Physical Exam   Constitutional: He is oriented to person, place, and time. He appears well-developed.   HENT:   Head: Normocephalic and atraumatic.   Ears:   Right Ear: External ear normal.   Left Ear: External ear normal.   Nose: Congestion present.   Mouth/Throat: Oropharynx is clear and moist.  Mucous membranes are moist. Oropharynx is clear.   Eyes: Conjunctivae, EOM and lids are normal. Pupils are equal, round, and reactive to light.   Neck: Trachea normal and phonation normal. Neck supple.   Cardiovascular: Normal rate, regular rhythm, normal heart sounds and normal pulses.   Pulmonary/Chest: Effort normal and breath sounds normal.   Musculoskeletal: Normal range of motion.         General: Normal range of motion.   Neurological: no focal deficit. He is alert and oriented to person, place, and time.   Skin: Skin is warm, dry and intact. Capillary refill takes less than 2 seconds.   Psychiatric: His speech is normal and behavior is normal. Judgment and thought content normal.   Nursing note and vitals reviewed.      Assessment:     1. Acute sinusitis, recurrence not specified, unspecified location    2. Cough, unspecified type        Plan:       Acute sinusitis, recurrence not specified, unspecified location  -     predniSONE (DELTASONE) 20 MG tablet; Take 2 tablets (40 mg total) by mouth once daily. for 5 days  Dispense: 10 tablet; Refill: 0  -     fluticasone propionate (FLONASE) 50 mcg/actuation nasal spray; 1 spray (50 mcg total) by Each Nostril route once daily.  Dispense: 16 g; Refill: 0    Cough, unspecified type  -     benzonatate (TESSALON) 200 MG capsule; Take 1 capsule (200 mg total) by mouth 3 (three) times daily as needed for Cough.  Dispense: 30 capsule; Refill: 0              Patient Instructions   - Rest.    - Drink plenty of fluids.  - Viral upper respiratory infections typically run their course in 10-14 days.      - You can take over-the-counter claritin, zyrtec, allegra, or xyzal as directed. These are antihistamines that can help with runny nose, nasal congestion, sneezing, and helps to dry up post-nasal drip, which usually causes sore throat and cough.              - If you do NOT have high blood pressure, you may use a decongestant form (D)  of this medication (ie. Claritin- D,  zyrtec-D, allegra-D) or if you do not take the D form, you can take sudafed (pseudoephedrine) over the counter, which is a decongestant. Do NOT take two decongestant (D) medications at the same time (such as mucinex-D and claritin-D or plain sudafed and claritin D)    - If you DO have Hypertension, anxiety, or palpitations, it is safe to take Coricidin HBP for relief of sinus symptoms.     - You can use Flonase (fluticasone) nasal spray as directed for sinus congestion and postnasal drip. This is a steroid nasal spray that works locally over time to decrease the inflammation in your nose/sinuses and help with allergic symptoms. This is not an quick- relief spray like afrin, but it works well if used daily.  Discontinue if you develop nose bleed  - use nasal saline prior to Flonase.  - Use Ocean Spray Nasal Saline 1-3 puffs each nostril every 2-3 hours then blow out onto tissue. This is to irrigate the nasal passage way to clear the sinus openings. Use until sinus problem resolved.     - you can take plain Mucinex (guaifenesin) 1200 mg twice a day to help loosen mucous.      -warm salt water gargles can help with sore throat     - warm tea with honey can help with cough. Honey is a natural cough suppressant.     - Dextromethorphan (DM) is a cough suppressant over the counter (ie. mucinex DM, robitussin, delsym; dayquil/nyquil has DM as well.)        - Follow up with your PCP or specialty clinic as directed in the next 1-2 weeks if not improved or as needed.  You can call [phone] to schedule an appointment with the appropriate provider.       - Go to the ER if you develop new or worsening symptoms.      - You must understand that you have received an Urgent Care treatment only and that you may be released before all of your medical problems are known or treated.   - You, the patient, will arrange for follow up care as instructed.   - If your condition worsens or fails to improve we recommend that you receive  another evaluation at the ER immediately or contact your PCP to discuss your concerns or return here.

## 2023-11-28 NOTE — PATIENT INSTRUCTIONS

## 2023-12-23 DIAGNOSIS — I82.451 ACUTE DEEP VEIN THROMBOSIS (DVT) OF RIGHT PERONEAL VEIN: ICD-10-CM

## 2023-12-26 RX ORDER — APIXABAN 5 MG/1
5 TABLET, FILM COATED ORAL 2 TIMES DAILY
Qty: 60 TABLET | Refills: 1 | Status: SHIPPED | OUTPATIENT
Start: 2023-12-26 | End: 2024-03-18

## 2024-01-11 ENCOUNTER — PATIENT MESSAGE (OUTPATIENT)
Dept: HEMATOLOGY/ONCOLOGY | Facility: CLINIC | Age: 69
End: 2024-01-11
Payer: MEDICARE

## 2024-01-11 ENCOUNTER — TELEPHONE (OUTPATIENT)
Dept: HEMATOLOGY/ONCOLOGY | Facility: CLINIC | Age: 69
End: 2024-01-11
Payer: MEDICARE

## 2024-01-11 ENCOUNTER — PATIENT MESSAGE (OUTPATIENT)
Dept: ADMINISTRATIVE | Facility: HOSPITAL | Age: 69
End: 2024-01-11
Payer: MEDICARE

## 2024-01-11 DIAGNOSIS — I82.451 ACUTE DEEP VEIN THROMBOSIS (DVT) OF RIGHT PERONEAL VEIN: Primary | ICD-10-CM

## 2024-01-11 DIAGNOSIS — Z12.11 COLON CANCER SCREENING: Primary | ICD-10-CM

## 2024-01-11 NOTE — TELEPHONE ENCOUNTER
----- Message from Werner Roberts MD sent at 1/11/2024  1:31 PM CST -----  Regarding: Follow-up  This patient needs follow-up with Dr. Alfonso in the next couple weeks. Was seen in October for a DVT and needed follow-up after 3-months. Thanks!     Regards,   Dr. Roberts

## 2024-01-12 DIAGNOSIS — Z12.11 SCREENING FOR COLON CANCER: ICD-10-CM

## 2024-01-24 ENCOUNTER — OFFICE VISIT (OUTPATIENT)
Dept: HEMATOLOGY/ONCOLOGY | Facility: CLINIC | Age: 69
End: 2024-01-24
Payer: MEDICARE

## 2024-01-24 ENCOUNTER — LAB VISIT (OUTPATIENT)
Dept: LAB | Facility: HOSPITAL | Age: 69
End: 2024-01-24
Payer: MEDICARE

## 2024-01-24 VITALS
DIASTOLIC BLOOD PRESSURE: 95 MMHG | WEIGHT: 248.44 LBS | RESPIRATION RATE: 18 BRPM | HEIGHT: 72 IN | SYSTOLIC BLOOD PRESSURE: 147 MMHG | OXYGEN SATURATION: 95 % | BODY MASS INDEX: 33.65 KG/M2 | TEMPERATURE: 98 F | HEART RATE: 75 BPM

## 2024-01-24 DIAGNOSIS — E78.49 OTHER HYPERLIPIDEMIA: Chronic | ICD-10-CM

## 2024-01-24 DIAGNOSIS — E66.9 OBESITY (BMI 30.0-34.9): Chronic | ICD-10-CM

## 2024-01-24 DIAGNOSIS — R03.0 ELEVATED BLOOD PRESSURE READING WITHOUT DIAGNOSIS OF HYPERTENSION: Chronic | ICD-10-CM

## 2024-01-24 DIAGNOSIS — I82.451 ACUTE DEEP VEIN THROMBOSIS (DVT) OF RIGHT PERONEAL VEIN: ICD-10-CM

## 2024-01-24 DIAGNOSIS — I82.451 ACUTE DEEP VEIN THROMBOSIS (DVT) OF RIGHT PERONEAL VEIN: Primary | ICD-10-CM

## 2024-01-24 LAB
ALBUMIN SERPL BCP-MCNC: 3.8 G/DL (ref 3.5–5.2)
ALP SERPL-CCNC: 54 U/L (ref 55–135)
ALT SERPL W/O P-5'-P-CCNC: 40 U/L (ref 10–44)
ANION GAP SERPL CALC-SCNC: 12 MMOL/L (ref 8–16)
AST SERPL-CCNC: 28 U/L (ref 10–40)
BASOPHILS # BLD AUTO: 0.08 K/UL (ref 0–0.2)
BASOPHILS NFR BLD: 1.2 % (ref 0–1.9)
BILIRUB SERPL-MCNC: 0.5 MG/DL (ref 0.1–1)
BUN SERPL-MCNC: 21 MG/DL (ref 8–23)
CALCIUM SERPL-MCNC: 9.5 MG/DL (ref 8.7–10.5)
CHLORIDE SERPL-SCNC: 104 MMOL/L (ref 95–110)
CO2 SERPL-SCNC: 21 MMOL/L (ref 23–29)
CREAT SERPL-MCNC: 1 MG/DL (ref 0.5–1.4)
DIFFERENTIAL METHOD BLD: ABNORMAL
EOSINOPHIL # BLD AUTO: 0.3 K/UL (ref 0–0.5)
EOSINOPHIL NFR BLD: 4.9 % (ref 0–8)
ERYTHROCYTE [DISTWIDTH] IN BLOOD BY AUTOMATED COUNT: 12.8 % (ref 11.5–14.5)
EST. GFR  (NO RACE VARIABLE): >60 ML/MIN/1.73 M^2
FACT V ACT/NOR PPP: 143 % (ref 60–145)
GLUCOSE SERPL-MCNC: 125 MG/DL (ref 70–110)
HCT VFR BLD AUTO: 46.3 % (ref 40–54)
HGB BLD-MCNC: 15.9 G/DL (ref 14–18)
IMM GRANULOCYTES # BLD AUTO: 0.02 K/UL (ref 0–0.04)
IMM GRANULOCYTES NFR BLD AUTO: 0.3 % (ref 0–0.5)
LYMPHOCYTES # BLD AUTO: 2.6 K/UL (ref 1–4.8)
LYMPHOCYTES NFR BLD: 38.6 % (ref 18–48)
MCH RBC QN AUTO: 33 PG (ref 27–31)
MCHC RBC AUTO-ENTMCNC: 34.3 G/DL (ref 32–36)
MCV RBC AUTO: 96 FL (ref 82–98)
MONOCYTES # BLD AUTO: 0.7 K/UL (ref 0.3–1)
MONOCYTES NFR BLD: 10.2 % (ref 4–15)
NEUTROPHILS # BLD AUTO: 3.1 K/UL (ref 1.8–7.7)
NEUTROPHILS NFR BLD: 44.8 % (ref 38–73)
NRBC BLD-RTO: 0 /100 WBC
PLATELET # BLD AUTO: 195 K/UL (ref 150–450)
PMV BLD AUTO: 9.4 FL (ref 9.2–12.9)
POTASSIUM SERPL-SCNC: 3.9 MMOL/L (ref 3.5–5.1)
PROT SERPL-MCNC: 7.6 G/DL (ref 6–8.4)
RBC # BLD AUTO: 4.82 M/UL (ref 4.6–6.2)
SODIUM SERPL-SCNC: 137 MMOL/L (ref 136–145)
WBC # BLD AUTO: 6.79 K/UL (ref 3.9–12.7)

## 2024-01-24 PROCEDURE — 85220 BLOOC CLOT FACTOR V TEST: CPT | Mod: HCNC | Performed by: INTERNAL MEDICINE

## 2024-01-24 PROCEDURE — 3080F DIAST BP >= 90 MM HG: CPT | Mod: HCNC,CPTII,S$GLB, | Performed by: INTERNAL MEDICINE

## 2024-01-24 PROCEDURE — 1159F MED LIST DOCD IN RCRD: CPT | Mod: HCNC,CPTII,S$GLB, | Performed by: INTERNAL MEDICINE

## 2024-01-24 PROCEDURE — 36415 COLL VENOUS BLD VENIPUNCTURE: CPT | Mod: HCNC | Performed by: INTERNAL MEDICINE

## 2024-01-24 PROCEDURE — 86146 BETA-2 GLYCOPROTEIN ANTIBODY: CPT | Mod: HCNC | Performed by: INTERNAL MEDICINE

## 2024-01-24 PROCEDURE — 3077F SYST BP >= 140 MM HG: CPT | Mod: HCNC,CPTII,S$GLB, | Performed by: INTERNAL MEDICINE

## 2024-01-24 PROCEDURE — 1101F PT FALLS ASSESS-DOCD LE1/YR: CPT | Mod: HCNC,CPTII,S$GLB, | Performed by: INTERNAL MEDICINE

## 2024-01-24 PROCEDURE — 86147 CARDIOLIPIN ANTIBODY EA IG: CPT | Mod: 59,HCNC | Performed by: INTERNAL MEDICINE

## 2024-01-24 PROCEDURE — 3288F FALL RISK ASSESSMENT DOCD: CPT | Mod: HCNC,CPTII,S$GLB, | Performed by: INTERNAL MEDICINE

## 2024-01-24 PROCEDURE — 99999 PR PBB SHADOW E&M-EST. PATIENT-LVL III: CPT | Mod: PBBFAC,HCNC,, | Performed by: INTERNAL MEDICINE

## 2024-01-24 PROCEDURE — 80053 COMPREHEN METABOLIC PANEL: CPT | Mod: HCNC | Performed by: INTERNAL MEDICINE

## 2024-01-24 PROCEDURE — 1126F AMNT PAIN NOTED NONE PRSNT: CPT | Mod: HCNC,CPTII,S$GLB, | Performed by: INTERNAL MEDICINE

## 2024-01-24 PROCEDURE — 3008F BODY MASS INDEX DOCD: CPT | Mod: HCNC,CPTII,S$GLB, | Performed by: INTERNAL MEDICINE

## 2024-01-24 PROCEDURE — 99214 OFFICE O/P EST MOD 30 MIN: CPT | Mod: HCNC,S$GLB,, | Performed by: INTERNAL MEDICINE

## 2024-01-24 PROCEDURE — 85025 COMPLETE CBC W/AUTO DIFF WBC: CPT | Mod: HCNC | Performed by: INTERNAL MEDICINE

## 2024-01-24 NOTE — PROGRESS NOTES
HEMATOLOGY ONCOLOGY [facility]    PATIENT: Ron Christina  MRN: [id number]  DATE: 1/24/2024    Visit Diagnosis: No primary diagnosis found.      Subjective:      HPI:   Mr. Christina is a 68 y.o. male who presents for follow up of  DVT. His first DVT was in the right posterior tibial vein in 2017 and was attributed to a possible leg injury and travel (plain and cruise). He completed three months of apixaban and had no other clots.   He was visiting his son in [place] (8-hour drive) in September 2023 and was working on a hot tub when he stepped into a trench and hurt his right leg. He then drove back to [place] and went to the ED four days after the injury , in Oct 2023, and was diagnosed with a right posterior tibial DVT. He had an U/S after treatment of his first DVT and there was no evidence of a thrombus after treatment. He reports multiple family members have had blood clots, most seem to have had a provoking incident.       Interval History: He is here for follow up visit. He is taking eliquis 5mg BID without any issues.     Past Medical History:   Allergies:  Review of patient's allergies indicates:   Allergen Reactions    Penicillins Other (See Comments)       Medications:  Current Outpatient Medications   Medication Sig Dispense Refill    ELIQUIS 5 mg Tab TAKE 1 TABLET(5 MG) BY MOUTH TWICE DAILY 60 tablet 1    fluticasone propionate (FLONASE) 50 mcg/actuation nasal spray 1 spray (50 mcg total) by Each Nostril route once daily. 16 g 0     No current facility-administered medications for this visit.       Review of Systems   Constitutional:  Negative for chills and fever.   HENT:  Negative for congestion and sore throat.    Eyes:  Negative for pain.   Respiratory:  Negative for cough and shortness of breath.    Cardiovascular:  Negative for chest pain, palpitations and leg swelling.   Gastrointestinal:  Negative for abdominal pain, constipation, diarrhea, nausea and vomiting.   Genitourinary:  Negative for  difficulty urinating, dysuria and hematuria.   Musculoskeletal:  Negative for back pain.   Skin:  Negative for rash.   Neurological:  Negative for light-headedness and headaches.   Hematological:  Does not bruise/bleed easily.   Psychiatric/Behavioral:  Negative for agitation.        Objective:      Vitals:   Vitals:    01/24/24 1445   BP: (!) 147/95   Pulse: 75   Resp: 18   Temp: 97.8 °F (36.6 °C)         Physical Exam  Constitutional:       Appearance: Normal appearance.   HENT:      Head: Normocephalic and atraumatic.      Mouth/Throat:      Mouth: Mucous membranes are moist.      Pharynx: Oropharynx is clear.   Eyes:      Extraocular Movements: Extraocular movements intact.      Pupils: Pupils are equal, round, and reactive to light.   Cardiovascular:      Rate and Rhythm: Normal rate and regular rhythm.      Pulses: Normal pulses.      Heart sounds: Normal heart sounds.   Pulmonary:      Effort: Pulmonary effort is normal.      Breath sounds: Normal breath sounds.   Abdominal:      General: Abdomen is flat.      Palpations: Abdomen is soft.      Tenderness: There is no abdominal tenderness.   Musculoskeletal:         General: Swelling (Right lower leg slightly more swollen than left) present. Normal range of motion.      Cervical back: Normal range of motion and neck supple.      Right lower leg: No edema.      Left lower leg: No edema.   Skin:     General: Skin is warm and dry.   Neurological:      General: No focal deficit present.      Mental Status: He is alert and oriented to person, place, and time.   Psychiatric:         Mood and Affect: Mood normal.         Behavior: Behavior normal.         Laboratory Data:  Lab Visit on 01/24/2024   Component Date Value Ref Range Status    WBC 01/24/2024 6.79  3.90 - 12.70 K/uL Final    RBC 01/24/2024 4.82  4.60 - 6.20 M/uL Final    Hemoglobin 01/24/2024 15.9  14.0 - 18.0 g/dL Final    Hematocrit 01/24/2024 46.3  40.0 - 54.0 % Final    MCV 01/24/2024 96  82 - 98 fL  Final    MCH 01/24/2024 33.0 (H)  27.0 - 31.0 pg Final    MCHC 01/24/2024 34.3  32.0 - 36.0 g/dL Final    RDW 01/24/2024 12.8  11.5 - 14.5 % Final    Platelets 01/24/2024 195  150 - 450 K/uL Final    MPV 01/24/2024 9.4  9.2 - 12.9 fL Final    Immature Granulocytes 01/24/2024 0.3  0.0 - 0.5 % Final    Gran # (ANC) 01/24/2024 3.1  1.8 - 7.7 K/uL Final    Immature Grans (Abs) 01/24/2024 0.02  0.00 - 0.04 K/uL Final    Comment: Mild elevation in immature granulocytes is non specific and   can be seen in a variety of conditions including stress response,   acute inflammation, trauma and pregnancy. Correlation with other   laboratory and clinical findings is essential.      Lymph # 01/24/2024 2.6  1.0 - 4.8 K/uL Final    Mono # 01/24/2024 0.7  0.3 - 1.0 K/uL Final    Eos # 01/24/2024 0.3  0.0 - 0.5 K/uL Final    Baso # 01/24/2024 0.08  0.00 - 0.20 K/uL Final    nRBC 01/24/2024 0  0 /100 WBC Final    Gran % 01/24/2024 44.8  38.0 - 73.0 % Final    Lymph % 01/24/2024 38.6  18.0 - 48.0 % Final    Mono % 01/24/2024 10.2  4.0 - 15.0 % Final    Eosinophil % 01/24/2024 4.9  0.0 - 8.0 % Final    Basophil % 01/24/2024 1.2  0.0 - 1.9 % Final    Differential Method 01/24/2024 Automated   Final    Sodium 01/24/2024 137  136 - 145 mmol/L Final    Potassium 01/24/2024 3.9  3.5 - 5.1 mmol/L Final    Chloride 01/24/2024 104  95 - 110 mmol/L Final    CO2 01/24/2024 21 (L)  23 - 29 mmol/L Final    Glucose 01/24/2024 125 (H)  70 - 110 mg/dL Final    BUN 01/24/2024 21  8 - 23 mg/dL Final    Creatinine 01/24/2024 1.0  0.5 - 1.4 mg/dL Final    Calcium 01/24/2024 9.5  8.7 - 10.5 mg/dL Final    Total Protein 01/24/2024 7.6  6.0 - 8.4 g/dL Final    Albumin 01/24/2024 3.8  3.5 - 5.2 g/dL Final    Total Bilirubin 01/24/2024 0.5  0.1 - 1.0 mg/dL Final    Comment: For infants and newborns, interpretation of results should be based  on gestational age, weight and in agreement with clinical  observations.    Premature Infant recommended reference  ranges:  Up to 24 hours.............<8.0 mg/dL  Up to 48 hours............<12.0 mg/dL  3-5 days..................<15.0 mg/dL  6-29 days.................<15.0 mg/dL      Alkaline Phosphatase 01/24/2024 54 (L)  55 - 135 U/L Final    AST 01/24/2024 28  10 - 40 U/L Final    ALT 01/24/2024 40  10 - 44 U/L Final    eGFR 01/24/2024 >60.0  >60 mL/min/1.73 m^2 Final    Anion Gap 01/24/2024 12  8 - 16 mmol/L Final     Component      Latest Ref Rng 10/9/2023 1/24/2024   WBC      3.90 - 12.70 K/uL  6.79    RBC      4.60 - 6.20 M/uL  4.82    Hemoglobin      14.0 - 18.0 g/dL  15.9    Hematocrit      40.0 - 54.0 %  46.3    MCV      82 - 98 fL  96    MCH      27.0 - 31.0 pg  33.0 (H)    MCHC      32.0 - 36.0 g/dL  34.3    RDW      11.5 - 14.5 %  12.8    Platelet Count      150 - 450 K/uL  195    MPV      9.2 - 12.9 fL  9.4    Immature Granulocytes      0.0 - 0.5 %  0.3    Gran # (ANC)      1.8 - 7.7 K/uL  3.1    Immature Grans (Abs)      0.00 - 0.04 K/uL  0.02    Lymph #      1.0 - 4.8 K/uL  2.6    Mono #      0.3 - 1.0 K/uL  0.7    Eos #      0.0 - 0.5 K/uL  0.3    Baso #      0.00 - 0.20 K/uL  0.08    nRBC      0 /100 WBC  0    Gran %      38.0 - 73.0 %  44.8    Lymph %      18.0 - 48.0 %  38.6    Mono %      4.0 - 15.0 %  10.2    Eosinophil %      0.0 - 8.0 %  4.9    Basophil %      0.0 - 1.9 %  1.2    Differential Method  Automated    Sodium      136 - 145 mmol/L  137    Potassium      3.5 - 5.1 mmol/L  3.9    Chloride      95 - 110 mmol/L  104    CO2      23 - 29 mmol/L  21 (L)    Glucose      70 - 110 mg/dL  125 (H)    BUN      8 - 23 mg/dL  21    Creatinine      0.5 - 1.4 mg/dL  1.0    Calcium      8.7 - 10.5 mg/dL  9.5    PROTEIN TOTAL      6.0 - 8.4 g/dL  7.6    Albumin      3.5 - 5.2 g/dL  3.8    BILIRUBIN TOTAL      0.1 - 1.0 mg/dL  0.5    ALP      55 - 135 U/L  54 (L)    AST      10 - 40 U/L  28    ALT      10 - 44 U/L  40    eGFR      >60 mL/min/1.73 m^2  >60.0    Anion Gap      8 - 16 mmol/L  12    Beta-2 Glyco 1  IgG      <7.0 U/mL 1.1     Beta-2 Glyco 1 IgM      <7.0 U/mL <2.4     Beta-2 Glyco 1 IgA      <7.0 U/mL 3.2     APA Isotype IgG      0.00 - 14.99 GPL <9.40     APA Isotype IgM      0.00 - 12.49 MPL <9.40        Legend:  (H) High  (L) Low     Assessment:       1. Recurrent DVT  2. On long term, anticoagulation         Plan:   Patient here with his second DVT. First one was in 2017 and likely provoked by travel and a leg injury. Was on apixaban for 6-months and post-treatment U/S revealed no evidence of thrombus. Was diagnosed with his second DVT on 10/3/23. He had two long car rides prior to this recent DVT and a leg injury as well. He stopped multiple times on his car ride so that in itself unlikely to cause the DVT but he may have been more prone due to his leg injury.     - Beta-2 glycoprotein and anti-cardiolipin antibody negative in Oct 2023   - Factor V leiden  pending  - He has been on apixaban 5mg BID for 3-months   - Given this is his second DVT and there is a family history of blood clots, he would benefit from continued treatment at maintenance dose long term ( 2.5mg BID)  -CBC normal in Oct 2023  -Uptodate with colonoscopy  -PSA normal in March 2023      2. Elevated BP: he has white coat syndrome      3. Obesity: BMI 33.7        BMT Chart Routing      Follow up with physician 6 months.   Follow up with LAURA    Provider visit type    Infusion scheduling note    Injection scheduling note    Labs CBC and CMP   Scheduling:  Preferred lab:  Lab interval:     Imaging    Pharmacy appointment    Other referrals

## 2024-01-29 LAB
B2 GLYCOPROT1 IGA SER QL: 3.7 U/ML
CARDIOLIPIN IGG SER IA-ACNC: <9.4 GPL (ref 0–14.99)
CARDIOLIPIN IGM SER IA-ACNC: <9.4 MPL (ref 0–12.49)

## 2024-02-12 ENCOUNTER — PATIENT MESSAGE (OUTPATIENT)
Dept: ADMINISTRATIVE | Facility: HOSPITAL | Age: 69
End: 2024-02-12
Payer: MEDICARE

## 2024-02-24 LAB — HEMOCCULT STL QL IA: NEGATIVE

## 2024-02-26 ENCOUNTER — LAB VISIT (OUTPATIENT)
Dept: LAB | Facility: HOSPITAL | Age: 69
End: 2024-02-26
Attending: INTERNAL MEDICINE
Payer: MEDICARE

## 2024-02-26 DIAGNOSIS — Z12.11 COLON CANCER SCREENING: ICD-10-CM

## 2024-02-26 PROCEDURE — 82274 ASSAY TEST FOR BLOOD FECAL: CPT | Mod: HCNC | Performed by: INTERNAL MEDICINE

## 2024-02-27 LAB — HEMOCCULT STL QL IA: NEGATIVE

## 2024-03-07 ENCOUNTER — PATIENT MESSAGE (OUTPATIENT)
Dept: INTERNAL MEDICINE | Facility: CLINIC | Age: 69
End: 2024-03-07
Payer: MEDICARE

## 2024-03-15 ENCOUNTER — LAB VISIT (OUTPATIENT)
Dept: LAB | Facility: HOSPITAL | Age: 69
End: 2024-03-15
Attending: INTERNAL MEDICINE
Payer: MEDICARE

## 2024-03-15 DIAGNOSIS — Z12.5 SCREENING FOR PROSTATE CANCER: ICD-10-CM

## 2024-03-15 DIAGNOSIS — E66.9 OBESITY (BMI 30.0-34.9): Chronic | ICD-10-CM

## 2024-03-15 DIAGNOSIS — Z86.39 PERSONAL HISTORY OF OTHER ENDOCRINE, NUTRITIONAL AND METABOLIC DISEASE: ICD-10-CM

## 2024-03-15 DIAGNOSIS — E78.49 OTHER HYPERLIPIDEMIA: Chronic | ICD-10-CM

## 2024-03-15 LAB
ALBUMIN SERPL BCP-MCNC: 4.1 G/DL (ref 3.5–5.2)
ALP SERPL-CCNC: 54 U/L (ref 55–135)
ALT SERPL W/O P-5'-P-CCNC: 33 U/L (ref 10–44)
ANION GAP SERPL CALC-SCNC: 10 MMOL/L (ref 8–16)
AST SERPL-CCNC: 25 U/L (ref 10–40)
BILIRUB SERPL-MCNC: 1 MG/DL (ref 0.1–1)
BUN SERPL-MCNC: 15 MG/DL (ref 8–23)
CALCIUM SERPL-MCNC: 9.8 MG/DL (ref 8.7–10.5)
CHLORIDE SERPL-SCNC: 106 MMOL/L (ref 95–110)
CHOLEST SERPL-MCNC: 237 MG/DL (ref 120–199)
CHOLEST/HDLC SERPL: 5 {RATIO} (ref 2–5)
CO2 SERPL-SCNC: 23 MMOL/L (ref 23–29)
COMPLEXED PSA SERPL-MCNC: 0.71 NG/ML (ref 0–4)
CREAT SERPL-MCNC: 0.9 MG/DL (ref 0.5–1.4)
EST. GFR  (NO RACE VARIABLE): >60 ML/MIN/1.73 M^2
ESTIMATED AVG GLUCOSE: 111 MG/DL (ref 68–131)
GLUCOSE SERPL-MCNC: 87 MG/DL (ref 70–110)
HBA1C MFR BLD: 5.5 % (ref 4–5.6)
HDLC SERPL-MCNC: 47 MG/DL (ref 40–75)
HDLC SERPL: 19.8 % (ref 20–50)
LDLC SERPL CALC-MCNC: 166 MG/DL (ref 63–159)
NONHDLC SERPL-MCNC: 190 MG/DL
POTASSIUM SERPL-SCNC: 4.4 MMOL/L (ref 3.5–5.1)
PROT SERPL-MCNC: 7.4 G/DL (ref 6–8.4)
SODIUM SERPL-SCNC: 139 MMOL/L (ref 136–145)
TRIGL SERPL-MCNC: 120 MG/DL (ref 30–150)

## 2024-03-15 PROCEDURE — 80061 LIPID PANEL: CPT | Mod: HCNC | Performed by: INTERNAL MEDICINE

## 2024-03-15 PROCEDURE — 36415 COLL VENOUS BLD VENIPUNCTURE: CPT | Mod: HCNC,PO | Performed by: INTERNAL MEDICINE

## 2024-03-15 PROCEDURE — 80053 COMPREHEN METABOLIC PANEL: CPT | Mod: HCNC | Performed by: INTERNAL MEDICINE

## 2024-03-15 PROCEDURE — 83036 HEMOGLOBIN GLYCOSYLATED A1C: CPT | Mod: HCNC | Performed by: INTERNAL MEDICINE

## 2024-03-15 PROCEDURE — 84153 ASSAY OF PSA TOTAL: CPT | Mod: HCNC | Performed by: INTERNAL MEDICINE

## 2024-03-18 ENCOUNTER — OFFICE VISIT (OUTPATIENT)
Dept: INTERNAL MEDICINE | Facility: CLINIC | Age: 69
End: 2024-03-18
Payer: MEDICARE

## 2024-03-18 VITALS
OXYGEN SATURATION: 98 % | WEIGHT: 247.38 LBS | HEIGHT: 72 IN | DIASTOLIC BLOOD PRESSURE: 88 MMHG | HEART RATE: 100 BPM | BODY MASS INDEX: 33.51 KG/M2 | SYSTOLIC BLOOD PRESSURE: 142 MMHG

## 2024-03-18 DIAGNOSIS — R03.0 ELEVATED BLOOD PRESSURE READING WITHOUT DIAGNOSIS OF HYPERTENSION: Chronic | ICD-10-CM

## 2024-03-18 DIAGNOSIS — Z00.00 ROUTINE MEDICAL EXAM: Primary | ICD-10-CM

## 2024-03-18 DIAGNOSIS — E78.49 OTHER HYPERLIPIDEMIA: Chronic | ICD-10-CM

## 2024-03-18 DIAGNOSIS — Z12.5 SCREENING FOR PROSTATE CANCER: ICD-10-CM

## 2024-03-18 DIAGNOSIS — E66.9 OBESITY (BMI 30.0-34.9): Chronic | ICD-10-CM

## 2024-03-18 DIAGNOSIS — I82.409 RECURRENT DEEP VEIN THROMBOSIS (DVT): Chronic | ICD-10-CM

## 2024-03-18 PROCEDURE — 3079F DIAST BP 80-89 MM HG: CPT | Mod: HCNC,CPTII,S$GLB, | Performed by: INTERNAL MEDICINE

## 2024-03-18 PROCEDURE — 99999 PR PBB SHADOW E&M-EST. PATIENT-LVL III: CPT | Mod: PBBFAC,HCNC,, | Performed by: INTERNAL MEDICINE

## 2024-03-18 PROCEDURE — 1126F AMNT PAIN NOTED NONE PRSNT: CPT | Mod: HCNC,CPTII,S$GLB, | Performed by: INTERNAL MEDICINE

## 2024-03-18 PROCEDURE — 1159F MED LIST DOCD IN RCRD: CPT | Mod: HCNC,CPTII,S$GLB, | Performed by: INTERNAL MEDICINE

## 2024-03-18 PROCEDURE — 1101F PT FALLS ASSESS-DOCD LE1/YR: CPT | Mod: HCNC,CPTII,S$GLB, | Performed by: INTERNAL MEDICINE

## 2024-03-18 PROCEDURE — 3044F HG A1C LEVEL LT 7.0%: CPT | Mod: HCNC,CPTII,S$GLB, | Performed by: INTERNAL MEDICINE

## 2024-03-18 PROCEDURE — 99397 PER PM REEVAL EST PAT 65+ YR: CPT | Mod: HCNC,S$GLB,, | Performed by: INTERNAL MEDICINE

## 2024-03-18 PROCEDURE — 1160F RVW MEDS BY RX/DR IN RCRD: CPT | Mod: HCNC,CPTII,S$GLB, | Performed by: INTERNAL MEDICINE

## 2024-03-18 PROCEDURE — 3288F FALL RISK ASSESSMENT DOCD: CPT | Mod: HCNC,CPTII,S$GLB, | Performed by: INTERNAL MEDICINE

## 2024-03-18 PROCEDURE — 3077F SYST BP >= 140 MM HG: CPT | Mod: HCNC,CPTII,S$GLB, | Performed by: INTERNAL MEDICINE

## 2024-03-18 PROCEDURE — 3008F BODY MASS INDEX DOCD: CPT | Mod: HCNC,CPTII,S$GLB, | Performed by: INTERNAL MEDICINE

## 2024-03-18 NOTE — PROGRESS NOTES
Assessment & Plan  Problem List Items Addressed This Visit          Cardiac/Vascular    Other hyperlipidemia (Chronic)    Overview     ASCVD risk score indicates need for statin.  I have counseled the patient on this risk factor as well also recommendation for statin use for reduction of heart attack and stroke risk.  He has verbally acknowledged this information and wishes NOT to pursue statins.  He is aware that this decision leaves him untreated for significant cardiovascular risk         Relevant Orders    Comprehensive Metabolic Panel    Lipid Panel    Elevated blood pressure reading without diagnosis of hypertension (Chronic)    Overview     Good home BP log            Hematology    Recurrent deep vein thrombosis (DVT) (Chronic)    Overview     Positive family history.  Known to Hematology.  Recommended long-term DOAC         Relevant Orders    CBC Auto Differential       Endocrine    Obesity (BMI 30.0-34.9) (Chronic)    Current Assessment & Plan     The patient's BMI has been recorded in the chart. The patient has been provided educational materials regarding the benefits of attaining and maintaining a normal weight. We will continue to address and follow this issue during follow up visits.           Other Visit Diagnoses       Routine medical exam    -  Primary  -    Discussed healthy diet, regular exercise, necessary labs, age appropriate cancer screening, and routine vaccinations.       Screening for prostate cancer    -  Screening 1 year    Relevant Orders    PSA, Screening              Health Maintenance reviewed, up-to-date with colorectal cancer screening.  Patient declines vaccines.  I will remove these from his health maintenance we have discussed this multiple times..    Follow-up: Follow up in about 1 year (around 3/18/2025) for Routine Physical.  ______________________________________________________________________    Chief Complaint  Chief Complaint   Patient presents with    Annual Exam        HPI  Ron Christina is a 68 y.o. male with medical diagnoses as listed in the medical history and problem list that presents for routine physical.  Pt is known to me with their last appointment 10/2023.      He would labs prior to this office visit showed generally reassuring CMP A1c PSA.  Lipids up.      Home BPs still running 125-130/70-80s.      No acute complaints    ROS  Review of Systems   Constitutional:  Negative for chills, fever and unexpected weight change.   Respiratory:  Negative for cough, chest tightness, shortness of breath and wheezing.    Cardiovascular:  Negative for chest pain, palpitations and leg swelling.   Gastrointestinal:  Negative for abdominal pain and blood in stool.   Genitourinary:  Negative for decreased urine volume, dysuria and hematuria.   Musculoskeletal:  Negative for arthralgias and myalgias.   Neurological:  Negative for dizziness, weakness, light-headedness and headaches.   Psychiatric/Behavioral:  Negative for decreased concentration, dysphoric mood, sleep disturbance and suicidal ideas.            Physical Exam  Vitals:    03/18/24 0853   BP: (!) 142/88   BP Location: Left arm   Patient Position: Sitting   BP Method: Large (Manual)   Pulse: 100   SpO2: 98%   Weight: 112.2 kg (247 lb 5.7 oz)   Height: 6' (1.829 m)    Body mass index is 33.55 kg/m².  Weight: 112.2 kg (247 lb 5.7 oz)   Height: 6' (182.9 cm)   Physical Exam  Constitutional:       General: He is not in acute distress.     Appearance: He is well-developed.   HENT:      Head: Normocephalic and atraumatic.   Eyes:      General: Lids are normal. No scleral icterus.     Conjunctiva/sclera: Conjunctivae normal.      Pupils: Pupils are equal, round, and reactive to light.   Neck:      Thyroid: No thyromegaly.      Vascular: No carotid bruit or JVD.   Cardiovascular:      Rate and Rhythm: Normal rate and regular rhythm.      Heart sounds: Normal heart sounds. No murmur heard.     No friction rub. No S3 or S4  sounds.   Pulmonary:      Effort: Pulmonary effort is normal.      Breath sounds: Normal breath sounds. No wheezing, rhonchi or rales.   Abdominal:      General: Bowel sounds are normal. There is no distension.      Palpations: Abdomen is soft.      Tenderness: There is no abdominal tenderness.   Musculoskeletal:         General: No tenderness.      Cervical back: Full passive range of motion without pain and neck supple.      Right lower leg: No edema.      Left lower leg: No edema.   Skin:     General: Skin is warm and dry.      Findings: No rash.   Neurological:      Mental Status: He is alert and oriented to person, place, and time.   Psychiatric:         Speech: Speech normal.         Behavior: Behavior normal.         Thought Content: Thought content normal.

## 2024-04-29 ENCOUNTER — OFFICE VISIT (OUTPATIENT)
Dept: FAMILY MEDICINE | Facility: CLINIC | Age: 69
End: 2024-04-29
Payer: MEDICARE

## 2024-04-29 VITALS
BODY MASS INDEX: 33.07 KG/M2 | DIASTOLIC BLOOD PRESSURE: 80 MMHG | WEIGHT: 244.19 LBS | OXYGEN SATURATION: 97 % | TEMPERATURE: 99 F | HEIGHT: 72 IN | HEART RATE: 80 BPM | SYSTOLIC BLOOD PRESSURE: 132 MMHG

## 2024-04-29 DIAGNOSIS — R03.0 ELEVATED BLOOD PRESSURE READING WITHOUT DIAGNOSIS OF HYPERTENSION: Chronic | ICD-10-CM

## 2024-04-29 DIAGNOSIS — Z71.89 ADVANCED DIRECTIVES, COUNSELING/DISCUSSION: ICD-10-CM

## 2024-04-29 DIAGNOSIS — Z00.00 ENCOUNTER FOR PREVENTIVE HEALTH EXAMINATION: Primary | ICD-10-CM

## 2024-04-29 DIAGNOSIS — Z00.00 ENCOUNTER FOR MEDICARE ANNUAL WELLNESS EXAM: ICD-10-CM

## 2024-04-29 PROCEDURE — 3079F DIAST BP 80-89 MM HG: CPT | Mod: HCNC,CPTII,S$GLB,

## 2024-04-29 PROCEDURE — 1170F FXNL STATUS ASSESSED: CPT | Mod: HCNC,CPTII,S$GLB,

## 2024-04-29 PROCEDURE — 1159F MED LIST DOCD IN RCRD: CPT | Mod: HCNC,CPTII,S$GLB,

## 2024-04-29 PROCEDURE — 3288F FALL RISK ASSESSMENT DOCD: CPT | Mod: HCNC,CPTII,S$GLB,

## 2024-04-29 PROCEDURE — 3044F HG A1C LEVEL LT 7.0%: CPT | Mod: HCNC,CPTII,S$GLB,

## 2024-04-29 PROCEDURE — 1160F RVW MEDS BY RX/DR IN RCRD: CPT | Mod: HCNC,CPTII,S$GLB,

## 2024-04-29 PROCEDURE — G0439 PPPS, SUBSEQ VISIT: HCPCS | Mod: HCNC,S$GLB,,

## 2024-04-29 PROCEDURE — 1101F PT FALLS ASSESS-DOCD LE1/YR: CPT | Mod: HCNC,CPTII,S$GLB,

## 2024-04-29 PROCEDURE — 99999 PR PBB SHADOW E&M-EST. PATIENT-LVL III: CPT | Mod: PBBFAC,HCNC,,

## 2024-04-29 PROCEDURE — 1158F ADVNC CARE PLAN TLK DOCD: CPT | Mod: HCNC,CPTII,S$GLB,

## 2024-04-29 PROCEDURE — 1126F AMNT PAIN NOTED NONE PRSNT: CPT | Mod: HCNC,CPTII,S$GLB,

## 2024-04-29 PROCEDURE — 3075F SYST BP GE 130 - 139MM HG: CPT | Mod: HCNC,CPTII,S$GLB,

## 2024-04-29 NOTE — PROGRESS NOTES
HPI     Chief Complaint:  AWV    Ron Christina is a 68 y.o. male with multiple medical diagnoses as listed in the medical history and problem list that presented for a Medicare AWV and comprehensive Health Risk Assessment today.  Pt is new to me but is known to this clinic with his last appointment being Visit date not found.      The following components were reviewed and updated:    Medical history  Family History  Social history  Allergies and Current Medications  Health Risk Assessment  Health Maintenance  Care Team     HPI  Pt presents for AWV.    Assessment & Plan   (all problems are new to me)    Diagnoses and health risks identified today and associated recommendations/orders:    Problem List Items Addressed This Visit          Cardiac/Vascular    Elevated blood pressure reading without diagnosis of hypertension (Chronic)  BP in clinic today 146/82 recheck 132/80.   BPs at home 120s-130s/60s-70s.     Overview     Good home BP log        Other Visit Diagnoses       Encounter for preventive health examination    -  Primary  Counseled on age appropriate medical preventative services including age appropriate cancer screenings, age appropriate eye and dental exams, over all nutritional health, need for a consistent exercise regimen, and an over all push towards maintaining a vigorous and active lifestyle.  Counseled on age appropriate vaccines and discussed upcoming health care needs based on age/gender. Discussed good sleep hygiene and stress management.      Encounter for Medicare annual wellness exam      Counseled on age appropriate medical preventative services including age appropriate cancer screenings, age appropriate eye and dental exams, over all nutritional health, need for a consistent exercise regimen, and an over all push towards maintaining a vigorous and active lifestyle.  Counseled on age appropriate vaccines and discussed upcoming health care needs based on age/gender. Discussed good sleep  hygiene and stress management.      Advanced directives, counseling/discussion      I offered to discuss advanced care planning, including how to pick a person who would make decisions for you if you were unable to make them for yourself, called a health care power of , and what kind of decisions you might make such as use of life sustaining treatments such as ventilators and tube feeding when faced with a life limiting illness recorded on a living will that they will need to know. (How you want to be cared for as you near the end of your natural life)     X Patient is interested in learning more about how to make advanced directives.  I provided them paperwork and offered to discuss this with them.                --------------------------------------------    ** See Completed Assessments for Annual Wellness Visit within the encounter summary.**    The following assessments were completed:  Living Situation  CAGE  Depression Screening  Timed Get Up and Go  Whisper Test  Cognitive Function Screening  Nutrition Screening  ADL Screening  PAQ Screening      Provided Ron Christina  with a 5-10 year written screening schedule and personal prevention plan. Recommendations were developed using the USPSTF age appropriate recommendations. Education, counseling, and referrals were provided as needed. After Visit Summary printed and given to patient which includes a list of additional screenings\tests needed.    Review for Opioid Screening: Pt does not have Rx for Opioids     Review for Substance Use Disorders: Patient does not abuse substances    Exam     Review of Systems:  (as noted above)  Review of Systems    Physical Exam:   Physical Exam  Constitutional:       Appearance: He is obese.   Cardiovascular:      Rate and Rhythm: Normal rate and regular rhythm.   Pulmonary:      Effort: Pulmonary effort is normal.      Breath sounds: Normal breath sounds.   Neurological:      Mental Status: He is alert.        Vitals:    04/29/24 0830 04/29/24 0852   BP: (!) 146/82 132/80   Pulse: 80    Temp: 98.5 °F (36.9 °C)    TempSrc: Oral    SpO2: 97%    Weight: 110.7 kg (244 lb 2.6 oz)    Height: 6' (1.829 m)       Body mass index is 33.11 kg/m².    Clock:              Health Maintenance:  Health Maintenance         Date Due Completion Date    Colorectal Cancer Screening 02/26/2025 2/26/2024    PROSTATE-SPECIFIC ANTIGEN 03/15/2025 3/15/2024    Hemoglobin A1c (Diabetic Prevention Screening) 03/15/2027 3/15/2024    Lipid Panel 03/15/2029 3/15/2024            Health maintenance reviewed      Follow Up:  No follow-ups on file.    History     Past Medical History:  Past Medical History:   Diagnosis Date    Allergy     COVID-19 07/2021    presumed Delta strain    Deep vein thrombosis        Past Surgical History:  Past Surgical History:   Procedure Laterality Date    HERNIA REPAIR         Social History:  Social History     Socioeconomic History    Marital status:    Tobacco Use    Smoking status: Never     Passive exposure: Never    Smokeless tobacco: Never   Substance and Sexual Activity    Alcohol use: Yes     Alcohol/week: 2.0 standard drinks of alcohol     Types: 1 Glasses of wine, 1 Shots of liquor per week     Comment: once a month    Drug use: No    Sexual activity: Not Currently     Social Determinants of Health     Financial Resource Strain: Low Risk  (4/22/2024)    Overall Financial Resource Strain (CARDIA)     Difficulty of Paying Living Expenses: Not hard at all   Food Insecurity: No Food Insecurity (4/22/2024)    Hunger Vital Sign     Worried About Running Out of Food in the Last Year: Never true     Ran Out of Food in the Last Year: Never true   Transportation Needs: No Transportation Needs (4/22/2024)    PRAPARE - Transportation     Lack of Transportation (Medical): No     Lack of Transportation (Non-Medical): No   Physical Activity: Insufficiently Active (4/22/2024)    Exercise Vital Sign     Days of Exercise  per Week: 2 days     Minutes of Exercise per Session: 40 min   Stress: Stress Concern Present (4/22/2024)    [employer] [place] [employer] - Occupational Stress Questionnaire     Feeling of Stress : To some extent   Social Connections: Unknown (4/22/2024)    Social Connection and Isolation Panel [NHANES]     Frequency of Communication with Friends and Family: More than three times a week     Frequency of Social Gatherings with Friends and Family: Twice a week     Active Member of Clubs or Organizations: No     Attends Club or Organization Meetings: Never     Marital Status:    Housing Stability: Unknown (4/22/2024)    Housing Stability Vital Sign     Unable to Pay for Housing in the Last Year: No       Family History:  Family History   Problem Relation Name Age of Onset    Alcohol abuse Mother      Asthma Father         Allergies and Medications: (updated and reviewed)  Review of patient's allergies indicates:   Allergen Reactions    Penicillins Other (See Comments)     Current Outpatient Medications   Medication Sig Dispense Refill    apixaban (ELIQUIS) 2.5 mg Tab Take 1 tablet (2.5 mg total) by mouth 2 (two) times daily. 60 tablet 5    fluticasone propionate (FLONASE) 50 mcg/actuation nasal spray 1 spray (50 mcg total) by Each Nostril route once daily. 16 g 0     No current facility-administered medications for this visit.           - The patient is given an After Visit Summary that lists all medications with directions, allergies, education, orders placed during this encounter and follow-up instructions.      - I have reviewed the patient's medical information including past medical, family, and social history sections including the medications and allergies.      - We discussed the patient's current medications.     This note was created by combination of typed  and MModal dictation.  Transcription errors may be present.  If there are any questions, please contact me.       Marylou  MANDI Martinez               I offered to discuss advanced care planning, including how to pick a person who would make decisions for you if you were unable to make them for yourself, called a health care power of , and what kind of decisions you might make such as use of life sustaining treatments such as ventilators and tube feeding when faced with a life limiting illness recorded on a living will that they will need to know. (How you want to be cared for as you near the end of your natural life)     X Patient is interested in learning more about how to make advanced directives.  I provided them paperwork and offered to discuss this with them.

## 2024-04-29 NOTE — PATIENT INSTRUCTIONS
Counseling and Referral of Other Preventative  (Italic type indicates deductible and co-insurance are waived)    Patient Name: Ron Christina  Today's Date: 4/29/2024    Health Maintenance       Date Due Completion Date    Colorectal Cancer Screening 02/26/2025 2/26/2024    PROSTATE-SPECIFIC ANTIGEN 03/15/2025 3/15/2024    Hemoglobin A1c (Diabetic Prevention Screening) 03/15/2027 3/15/2024    Lipid Panel 03/15/2029 3/15/2024        No orders of the defined types were placed in this encounter.      The following information is provided to all patients.  This information is to help you find resources for any of the problems found today that may be affecting your health:                  Living healthy guide: www.[facility].[place].gov      Understanding Diabetes: www.diabetes.org      Eating healthy: www.cdc.gov/healthyweight      [facility] home safety checklist: www.cdc.gov/steadi/patient.html      Agency on Aging: www.goea.[place].[facility]      Alcoholics anonymous (AA): www.aa.org      Physical Activity: www.chey.nih.gov/tq3aakc      Tobacco use: www.quitwithusla.org

## 2024-06-10 ENCOUNTER — PATIENT MESSAGE (OUTPATIENT)
Dept: INTERNAL MEDICINE | Facility: CLINIC | Age: 69
End: 2024-06-10
Payer: MEDICARE

## 2024-07-08 ENCOUNTER — PATIENT MESSAGE (OUTPATIENT)
Dept: HEMATOLOGY/ONCOLOGY | Facility: CLINIC | Age: 69
End: 2024-07-08
Payer: MEDICARE

## 2024-07-11 DIAGNOSIS — I82.451 ACUTE DEEP VEIN THROMBOSIS (DVT) OF RIGHT PERONEAL VEIN: ICD-10-CM

## 2024-07-11 RX ORDER — APIXABAN 2.5 MG/1
2.5 TABLET, FILM COATED ORAL 2 TIMES DAILY
Qty: 60 TABLET | Refills: 5 | Status: SHIPPED | OUTPATIENT
Start: 2024-07-11

## 2024-07-23 ENCOUNTER — LAB VISIT (OUTPATIENT)
Dept: LAB | Facility: HOSPITAL | Age: 69
End: 2024-07-23
Attending: INTERNAL MEDICINE
Payer: MEDICARE

## 2024-07-23 DIAGNOSIS — I82.451 ACUTE DEEP VEIN THROMBOSIS (DVT) OF RIGHT PERONEAL VEIN: ICD-10-CM

## 2024-07-23 LAB
ALBUMIN SERPL BCP-MCNC: 4 G/DL (ref 3.5–5.2)
ALP SERPL-CCNC: 55 U/L (ref 55–135)
ALT SERPL W/O P-5'-P-CCNC: 24 U/L (ref 10–44)
ANION GAP SERPL CALC-SCNC: 6 MMOL/L (ref 8–16)
AST SERPL-CCNC: 21 U/L (ref 10–40)
BASOPHILS # BLD AUTO: 0.08 K/UL (ref 0–0.2)
BASOPHILS NFR BLD: 1 % (ref 0–1.9)
BILIRUB SERPL-MCNC: 0.7 MG/DL (ref 0.1–1)
BUN SERPL-MCNC: 15 MG/DL (ref 8–23)
CALCIUM SERPL-MCNC: 9.7 MG/DL (ref 8.7–10.5)
CHLORIDE SERPL-SCNC: 106 MMOL/L (ref 95–110)
CO2 SERPL-SCNC: 27 MMOL/L (ref 23–29)
CREAT SERPL-MCNC: 0.9 MG/DL (ref 0.5–1.4)
DIFFERENTIAL METHOD BLD: ABNORMAL
EOSINOPHIL # BLD AUTO: 0.2 K/UL (ref 0–0.5)
EOSINOPHIL NFR BLD: 2.8 % (ref 0–8)
ERYTHROCYTE [DISTWIDTH] IN BLOOD BY AUTOMATED COUNT: 13.1 % (ref 11.5–14.5)
EST. GFR  (NO RACE VARIABLE): >60 ML/MIN/1.73 M^2
GLUCOSE SERPL-MCNC: 84 MG/DL (ref 70–110)
HCT VFR BLD AUTO: 49.2 % (ref 40–54)
HGB BLD-MCNC: 16.3 G/DL (ref 14–18)
IMM GRANULOCYTES # BLD AUTO: 0.04 K/UL (ref 0–0.04)
IMM GRANULOCYTES NFR BLD AUTO: 0.5 % (ref 0–0.5)
LYMPHOCYTES # BLD AUTO: 2.8 K/UL (ref 1–4.8)
LYMPHOCYTES NFR BLD: 34.1 % (ref 18–48)
MCH RBC QN AUTO: 32.7 PG (ref 27–31)
MCHC RBC AUTO-ENTMCNC: 33.1 G/DL (ref 32–36)
MCV RBC AUTO: 99 FL (ref 82–98)
MONOCYTES # BLD AUTO: 0.7 K/UL (ref 0.3–1)
MONOCYTES NFR BLD: 8.4 % (ref 4–15)
NEUTROPHILS # BLD AUTO: 4.4 K/UL (ref 1.8–7.7)
NEUTROPHILS NFR BLD: 53.2 % (ref 38–73)
NRBC BLD-RTO: 0 /100 WBC
PLATELET # BLD AUTO: 222 K/UL (ref 150–450)
PMV BLD AUTO: 11 FL (ref 9.2–12.9)
POTASSIUM SERPL-SCNC: 4.8 MMOL/L (ref 3.5–5.1)
PROT SERPL-MCNC: 7.6 G/DL (ref 6–8.4)
RBC # BLD AUTO: 4.99 M/UL (ref 4.6–6.2)
SODIUM SERPL-SCNC: 139 MMOL/L (ref 136–145)
WBC # BLD AUTO: 8.32 K/UL (ref 3.9–12.7)

## 2024-07-23 PROCEDURE — 85025 COMPLETE CBC W/AUTO DIFF WBC: CPT | Mod: HCNC | Performed by: INTERNAL MEDICINE

## 2024-07-23 PROCEDURE — 80053 COMPREHEN METABOLIC PANEL: CPT | Mod: HCNC | Performed by: INTERNAL MEDICINE

## 2024-07-23 PROCEDURE — 36415 COLL VENOUS BLD VENIPUNCTURE: CPT | Mod: HCNC,PO | Performed by: INTERNAL MEDICINE

## 2024-07-24 ENCOUNTER — OFFICE VISIT (OUTPATIENT)
Dept: HEMATOLOGY/ONCOLOGY | Facility: CLINIC | Age: 69
End: 2024-07-24
Payer: MEDICARE

## 2024-07-24 VITALS
OXYGEN SATURATION: 95 % | TEMPERATURE: 98 F | WEIGHT: 232.25 LBS | SYSTOLIC BLOOD PRESSURE: 134 MMHG | RESPIRATION RATE: 17 BRPM | HEIGHT: 72 IN | BODY MASS INDEX: 31.46 KG/M2 | HEART RATE: 67 BPM | DIASTOLIC BLOOD PRESSURE: 89 MMHG

## 2024-07-24 DIAGNOSIS — I82.409 RECURRENT DEEP VEIN THROMBOSIS (DVT): Primary | Chronic | ICD-10-CM

## 2024-07-24 DIAGNOSIS — E78.49 OTHER HYPERLIPIDEMIA: Chronic | ICD-10-CM

## 2024-07-24 DIAGNOSIS — R03.0 ELEVATED BLOOD PRESSURE READING WITHOUT DIAGNOSIS OF HYPERTENSION: Chronic | ICD-10-CM

## 2024-07-24 DIAGNOSIS — E66.9 OBESITY (BMI 30.0-34.9): Chronic | ICD-10-CM

## 2024-07-24 PROCEDURE — 1101F PT FALLS ASSESS-DOCD LE1/YR: CPT | Mod: HCNC,CPTII,S$GLB, | Performed by: INTERNAL MEDICINE

## 2024-07-24 PROCEDURE — 3008F BODY MASS INDEX DOCD: CPT | Mod: HCNC,CPTII,S$GLB, | Performed by: INTERNAL MEDICINE

## 2024-07-24 PROCEDURE — 3079F DIAST BP 80-89 MM HG: CPT | Mod: HCNC,CPTII,S$GLB, | Performed by: INTERNAL MEDICINE

## 2024-07-24 PROCEDURE — 99999 PR PBB SHADOW E&M-EST. PATIENT-LVL III: CPT | Mod: PBBFAC,HCNC,, | Performed by: INTERNAL MEDICINE

## 2024-07-24 PROCEDURE — 99213 OFFICE O/P EST LOW 20 MIN: CPT | Mod: HCNC,S$GLB,, | Performed by: INTERNAL MEDICINE

## 2024-07-24 PROCEDURE — 3288F FALL RISK ASSESSMENT DOCD: CPT | Mod: HCNC,CPTII,S$GLB, | Performed by: INTERNAL MEDICINE

## 2024-07-24 PROCEDURE — 1126F AMNT PAIN NOTED NONE PRSNT: CPT | Mod: HCNC,CPTII,S$GLB, | Performed by: INTERNAL MEDICINE

## 2024-07-24 PROCEDURE — 3044F HG A1C LEVEL LT 7.0%: CPT | Mod: HCNC,CPTII,S$GLB, | Performed by: INTERNAL MEDICINE

## 2024-07-24 PROCEDURE — 3075F SYST BP GE 130 - 139MM HG: CPT | Mod: HCNC,CPTII,S$GLB, | Performed by: INTERNAL MEDICINE

## 2024-07-24 NOTE — PROGRESS NOTES
HEMATOLOGY ONCOLOGY FELLOW CLINIC    PATIENT: Ron Christina  MRN: [id number]  DATE: 7/24/2024    Visit Diagnosis: No primary diagnosis found.      Subjective:      HPI:   Mr. Christina is a 68 y.o. male who presents for follow up of  DVT. His first DVT was in the right posterior tibial vein in 2017 and was attributed to a possible leg injury and travel (plain and cruise). He completed three months of apixaban and had no other clots.   He was visiting his son in [place] (8-hour drive) in September 2023 and was working on a hot tub when he stepped into a trench and hurt his right leg. He then drove back to [place] and went to the ED four days after the injury , in Oct 2023, and was diagnosed with a right posterior tibial DVT. He had an U/S after treatment of his first DVT and there was no evidence of a thrombus after treatment. He reports multiple family members have had blood clots, most seem to have had a provoking incident.       Interval History: He is here for follow up visit. He is taking eliquis 5mg BID without any issues.     Past Medical History:   Allergies:  Review of patient's allergies indicates:   Allergen Reactions    Penicillins Other (See Comments)       Medications:  Current Outpatient Medications   Medication Sig Dispense Refill    ELIQUIS 2.5 mg Tab TAKE 1 TABLET(2.5 MG) BY MOUTH TWICE DAILY 60 tablet 5    fluticasone propionate (FLONASE) 50 mcg/actuation nasal spray 1 spray (50 mcg total) by Each Nostril route once daily. 16 g 0     No current facility-administered medications for this visit.       Review of Systems   Constitutional:  Negative for chills and fever.   HENT:  Negative for congestion and sore throat.    Eyes:  Negative for pain.   Respiratory:  Negative for cough and shortness of breath.    Cardiovascular:  Negative for chest pain, palpitations and leg swelling.   Gastrointestinal:  Negative for abdominal pain, constipation, diarrhea, nausea and vomiting.   Genitourinary:  Negative  for difficulty urinating, dysuria and hematuria.   Musculoskeletal:  Negative for back pain.   Skin:  Negative for rash.   Neurological:  Negative for light-headedness and headaches.   Hematological:  Does not bruise/bleed easily.   Psychiatric/Behavioral:  Negative for agitation.        Objective:      Vitals:   Vitals:    07/24/24 1342   BP: 134/89   Pulse: 67   Resp: 17   Temp: 97.6 °F (36.4 °C)             Physical Exam  Constitutional:       Appearance: Normal appearance.   HENT:      Head: Normocephalic and atraumatic.      Mouth/Throat:      Mouth: Mucous membranes are moist.      Pharynx: Oropharynx is clear.   Eyes:      Extraocular Movements: Extraocular movements intact.      Pupils: Pupils are equal, round, and reactive to light.   Cardiovascular:      Rate and Rhythm: Normal rate and regular rhythm.      Pulses: Normal pulses.      Heart sounds: Normal heart sounds.   Pulmonary:      Effort: Pulmonary effort is normal.      Breath sounds: Normal breath sounds.   Abdominal:      General: Abdomen is flat.      Palpations: Abdomen is soft.      Tenderness: There is no abdominal tenderness.   Musculoskeletal:         General: Swelling (Right lower leg slightly more swollen than left) present. Normal range of motion.      Cervical back: Normal range of motion and neck supple.      Right lower leg: No edema.      Left lower leg: No edema.   Skin:     General: Skin is warm and dry.   Neurological:      General: No focal deficit present.      Mental Status: He is alert and oriented to person, place, and time.   Psychiatric:         Mood and Affect: Mood normal.         Behavior: Behavior normal.       Component      Latest Ref [facility] 7/23/2024   WBC      3.90 - 12.70 K/uL 8.32    RBC      4.60 - 6.20 M/uL 4.99    Hemoglobin      14.0 - 18.0 g/dL 16.3    Hematocrit      40.0 - 54.0 % 49.2    MCV      82 - 98 fL 99 (H)    MCH      27.0 - 31.0 pg 32.7 (H)    MCHC      32.0 - 36.0 g/dL 33.1    RDW      11.5 - 14.5 %  13.1    Platelet Count      150 - 450 K/uL 222    MPV      9.2 - 12.9 fL 11.0    Immature Granulocytes      0.0 - 0.5 % 0.5    Gran # (ANC)      1.8 - 7.7 K/uL 4.4    Immature Grans (Abs)      0.00 - 0.04 K/uL 0.04    Lymph #      1.0 - 4.8 K/uL 2.8    Mono #      0.3 - 1.0 K/uL 0.7    Eos #      0.0 - 0.5 K/uL 0.2    Baso #      0.00 - 0.20 K/uL 0.08    nRBC      0 /100 WBC 0    Gran %      38.0 - 73.0 % 53.2    Lymph %      18.0 - 48.0 % 34.1    Mono %      4.0 - 15.0 % 8.4    Eos %      0.0 - 8.0 % 2.8    Basophil %      0.0 - 1.9 % 1.0    Differential Method Automated    Sodium      136 - 145 mmol/L 139    Potassium      3.5 - 5.1 mmol/L 4.8    Chloride      95 - 110 mmol/L 106    CO2      23 - 29 mmol/L 27    Glucose      70 - 110 mg/dL 84    BUN      8 - 23 mg/dL 15    Creatinine      0.5 - 1.4 mg/dL 0.9    Calcium      8.7 - 10.5 mg/dL 9.7    PROTEIN TOTAL      6.0 - 8.4 g/dL 7.6    Albumin      3.5 - 5.2 g/dL 4.0    BILIRUBIN TOTAL      0.1 - 1.0 mg/dL 0.7    ALP      55 - 135 U/L 55    AST      10 - 40 U/L 21    ALT      10 - 44 U/L 24    eGFR      >60 mL/min/1.73 m^2 >60.0    Anion Gap      8 - 16 mmol/L 6 (L)       Legend:  (H) High  (L) Low  Laboratory Data:   Assessment:       1. Recurrent DVT  2. On long term, anticoagulation         Plan:    has history of multiple DVT. First one was in 2017 and likely provoked by travel and a leg injury. He was on apixaban for 6-months and post-treatment U/S revealed no evidence of thrombus. Was diagnosed with his second DVT on 10/3/23. He had two long car rides prior to this recent DVT and a leg injury as well. He stopped multiple times on his car ride so that in itself unlikely to cause the DVT but he may have been more prone due to his leg injury.     - Beta-2 glycoprotein and anti-cardiolipin antibody negative in Oct 2023   - He has been on apixaban 5mg BID for 3-months   - Given this is his second DVT and there is a family history of blood clots, he  would benefit from continued treatment at maintenance dose long term ( 2.5mg BID)  -CBC normal on 7/23/24  -Uptodate with colonoscopy  -PSA normal in March 2023  -he will continue maintenance apixaban      2. Elevated BP: he has white coat syndrome      3. 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

## 2024-07-25 ENCOUNTER — OFFICE VISIT (OUTPATIENT)
Dept: FAMILY MEDICINE | Facility: CLINIC | Age: 69
End: 2024-07-25
Payer: MEDICARE

## 2024-07-25 VITALS
BODY MASS INDEX: 31.24 KG/M2 | WEIGHT: 230.63 LBS | HEART RATE: 81 BPM | DIASTOLIC BLOOD PRESSURE: 70 MMHG | HEIGHT: 72 IN | TEMPERATURE: 98 F | SYSTOLIC BLOOD PRESSURE: 130 MMHG | OXYGEN SATURATION: 95 %

## 2024-07-25 DIAGNOSIS — Z79.01 CURRENT USE OF LONG TERM ANTICOAGULATION: ICD-10-CM

## 2024-07-25 DIAGNOSIS — R10.10 UPPER ABDOMINAL PAIN: Primary | ICD-10-CM

## 2024-07-25 DIAGNOSIS — K59.00 CONSTIPATION, UNSPECIFIED CONSTIPATION TYPE: ICD-10-CM

## 2024-07-25 DIAGNOSIS — Z86.718 HISTORY OF DVT (DEEP VEIN THROMBOSIS): ICD-10-CM

## 2024-07-25 DIAGNOSIS — Z12.11 COLON CANCER SCREENING: ICD-10-CM

## 2024-07-25 PROCEDURE — 99214 OFFICE O/P EST MOD 30 MIN: CPT | Mod: HCNC,S$GLB,, | Performed by: INTERNAL MEDICINE

## 2024-07-25 PROCEDURE — 3044F HG A1C LEVEL LT 7.0%: CPT | Mod: HCNC,CPTII,S$GLB, | Performed by: INTERNAL MEDICINE

## 2024-07-25 PROCEDURE — 1126F AMNT PAIN NOTED NONE PRSNT: CPT | Mod: HCNC,CPTII,S$GLB, | Performed by: INTERNAL MEDICINE

## 2024-07-25 PROCEDURE — 99999 PR PBB SHADOW E&M-EST. PATIENT-LVL III: CPT | Mod: PBBFAC,HCNC,, | Performed by: INTERNAL MEDICINE

## 2024-07-25 PROCEDURE — 3288F FALL RISK ASSESSMENT DOCD: CPT | Mod: HCNC,CPTII,S$GLB, | Performed by: INTERNAL MEDICINE

## 2024-07-25 PROCEDURE — 3078F DIAST BP <80 MM HG: CPT | Mod: HCNC,CPTII,S$GLB, | Performed by: INTERNAL MEDICINE

## 2024-07-25 PROCEDURE — 3075F SYST BP GE 130 - 139MM HG: CPT | Mod: HCNC,CPTII,S$GLB, | Performed by: INTERNAL MEDICINE

## 2024-07-25 PROCEDURE — 1159F MED LIST DOCD IN RCRD: CPT | Mod: HCNC,CPTII,S$GLB, | Performed by: INTERNAL MEDICINE

## 2024-07-25 PROCEDURE — 1101F PT FALLS ASSESS-DOCD LE1/YR: CPT | Mod: HCNC,CPTII,S$GLB, | Performed by: INTERNAL MEDICINE

## 2024-07-25 PROCEDURE — 3008F BODY MASS INDEX DOCD: CPT | Mod: HCNC,CPTII,S$GLB, | Performed by: INTERNAL MEDICINE

## 2024-07-25 NOTE — PROGRESS NOTES
Complaint: Follow-up on abdominal pain     Patient is a 68-year-old white male new to me.  On July 3rd he took a trip to [place] for a week.  While there he had some upper abdominal discomfort.  It moved from one side to the other but stayed across the upper abdomen.  He felt like he was bloated.  No nausea vomiting.  While there he was a little bit constipated having a bowel movement daily but like rabbit pellets.  When he came back he bought some colon cleanse probiotic and had some increased bowel movement output and felt much better with resolution of all symptoms.  He does not use any anti-inflammatories or aspirin being on Eliquis.  No prior problems with constipation.  We discussed he likely had some relative constipation and bloating.    Occasional insomnia and in the past was told to stop Advil PM and now just uses two Benadryl whenever he gets the insomnia.      Regarding colon cancer screening he had colonoscopy age 54 but now has been followed by just stool cards we discussed the limitations of the stool cards and Cologuard would be better although it can miss 8% of polyps as well and that colonoscopy would be the best test for colon cancer screening and he should consider.  No current symptoms that would indicate an immediate need for imaging of the colon, no suspicion for diverticulitis or ulcer disease and so forth.      also discussed his DVT history which was in the right leg associated with trauma but discussed he likely has some underlying tendency towards DVT formation and probably should continue anticoagulation lifelong as long as the DVT versus bleeding risk favor staying on the anticoagulant.    Review of systems: No other current or prior nausea vomiting diarrhea or bleeding.  No weight loss.        Past Medical History:   Diagnosis Date    Allergy     COVID-19 07/2021    presumed Delta strain    Deep vein thrombosis     Elevated blood pressure reading without diagnosis of hypertension  09/28/2016    Good home BP log      Other hyperlipidemia 02/13/2014    ASCVD risk score indicates need for statin.  I have counseled the patient on this risk factor as well also recommendation for statin use for reduction of heart attack and stroke risk.  He has verbally acknowledged this information and wishes NOT to pursue statins.  He is aware that this decision leaves him untreated for significant cardiovascular risk      Recurrent deep vein thrombosis (DVT) 10/06/2023    Positive family history.  Known to Hematology.  Recommended long-term DOAC      Umbilical hernia without obstruction and without gangrene 11/22/2019    Small.  Asymptomatic      Yearly fir kit done -no scope    Past Surgical History:   Procedure Laterality Date    HERNIA REPAIR       Social History     Socioeconomic History    Marital status:    Tobacco Use    Smoking status: Never     Passive exposure: Never    Smokeless tobacco: Never   Substance and Sexual Activity    Alcohol use: Yes     Alcohol/week: 2.0 standard drinks of alcohol     Types: 1 Glasses of wine, 1 Shots of liquor per week     Comment: once a month    Drug use: No    Sexual activity: Not Currently         family history includes Alcohol abuse in his mother; Asthma in his father.          Gen: no distress  EYES: conjunctiva clear, non-icteric, PERRL  ENT: nose clear, nasal mucosa normal, oropharynx clear and moist, teeth good  NECK:supple, thyroid non-palpable  RESP: effort is good, lungs clear  CV: heart RRR w/o murmur, gallops or rubs; no carotid bruits, no edema  GI: abdomen soft, non-distended, non-tender, no hepatosplenomegaly  MS: gait normal, no clubbing or cyanosis of the digits  SKIN: no rashes, warm to touch      Diagnoses and all orders for this visit:    Upper abdominal pain, symptoms sound functional and after promoting more bowel movement had resolution of the bloating and so forth so probably related to some relative constipation he should watch that for  the future although not been a problem with traveling and so forth in the past    Constipation, unspecified constipation type    History of DVT (deep vein thrombosis) continuing anticoagulation, seen by his hematologist yesterday    Current use of long term anticoagulation    Colon cancer screening, technically up-to-date with stool for blood but discussed limitations of that as a screening modality.  Should any abdominal symptoms recur or if any constipation issue persist, would recommend colonoscopy

## 2024-07-30 ENCOUNTER — PATIENT MESSAGE (OUTPATIENT)
Dept: HEMATOLOGY/ONCOLOGY | Facility: CLINIC | Age: 69
End: 2024-07-30
Payer: MEDICARE

## 2024-08-01 ENCOUNTER — PATIENT MESSAGE (OUTPATIENT)
Dept: INTERNAL MEDICINE | Facility: CLINIC | Age: 69
End: 2024-08-01
Payer: MEDICARE

## 2024-08-05 ENCOUNTER — OFFICE VISIT (OUTPATIENT)
Dept: INTERNAL MEDICINE | Facility: CLINIC | Age: 69
End: 2024-08-05
Payer: MEDICARE

## 2024-08-05 ENCOUNTER — PATIENT MESSAGE (OUTPATIENT)
Dept: INTERNAL MEDICINE | Facility: CLINIC | Age: 69
End: 2024-08-05

## 2024-08-05 VITALS
DIASTOLIC BLOOD PRESSURE: 76 MMHG | SYSTOLIC BLOOD PRESSURE: 118 MMHG | BODY MASS INDEX: 31.46 KG/M2 | OXYGEN SATURATION: 98 % | HEIGHT: 72 IN | WEIGHT: 232.25 LBS | HEART RATE: 83 BPM

## 2024-08-05 DIAGNOSIS — K29.00 ACUTE GASTRITIS WITHOUT HEMORRHAGE, UNSPECIFIED GASTRITIS TYPE: Primary | ICD-10-CM

## 2024-08-05 DIAGNOSIS — M21.941 HAND DEFORMITY, ACQUIRED, RIGHT: ICD-10-CM

## 2024-08-05 DIAGNOSIS — M79.671 RIGHT FOOT PAIN: ICD-10-CM

## 2024-08-05 PROCEDURE — 1126F AMNT PAIN NOTED NONE PRSNT: CPT | Mod: HCNC,CPTII,S$GLB, | Performed by: INTERNAL MEDICINE

## 2024-08-05 PROCEDURE — 3044F HG A1C LEVEL LT 7.0%: CPT | Mod: HCNC,CPTII,S$GLB, | Performed by: INTERNAL MEDICINE

## 2024-08-05 PROCEDURE — 3078F DIAST BP <80 MM HG: CPT | Mod: HCNC,CPTII,S$GLB, | Performed by: INTERNAL MEDICINE

## 2024-08-05 PROCEDURE — 3288F FALL RISK ASSESSMENT DOCD: CPT | Mod: HCNC,CPTII,S$GLB, | Performed by: INTERNAL MEDICINE

## 2024-08-05 PROCEDURE — 1159F MED LIST DOCD IN RCRD: CPT | Mod: HCNC,CPTII,S$GLB, | Performed by: INTERNAL MEDICINE

## 2024-08-05 PROCEDURE — 1101F PT FALLS ASSESS-DOCD LE1/YR: CPT | Mod: HCNC,CPTII,S$GLB, | Performed by: INTERNAL MEDICINE

## 2024-08-05 PROCEDURE — 1160F RVW MEDS BY RX/DR IN RCRD: CPT | Mod: HCNC,CPTII,S$GLB, | Performed by: INTERNAL MEDICINE

## 2024-08-05 PROCEDURE — 99214 OFFICE O/P EST MOD 30 MIN: CPT | Mod: HCNC,S$GLB,, | Performed by: INTERNAL MEDICINE

## 2024-08-05 PROCEDURE — 3074F SYST BP LT 130 MM HG: CPT | Mod: HCNC,CPTII,S$GLB, | Performed by: INTERNAL MEDICINE

## 2024-08-05 PROCEDURE — 99999 PR PBB SHADOW E&M-EST. PATIENT-LVL III: CPT | Mod: PBBFAC,HCNC,, | Performed by: INTERNAL MEDICINE

## 2024-08-05 PROCEDURE — 3008F BODY MASS INDEX DOCD: CPT | Mod: HCNC,CPTII,S$GLB, | Performed by: INTERNAL MEDICINE

## 2024-08-05 RX ORDER — OMEPRAZOLE 40 MG/1
40 CAPSULE, DELAYED RELEASE ORAL EVERY MORNING
Qty: 30 CAPSULE | Refills: 0 | Status: SHIPPED | OUTPATIENT
Start: 2024-08-05

## 2024-08-19 ENCOUNTER — HOSPITAL ENCOUNTER (EMERGENCY)
Facility: HOSPITAL | Age: 69
Discharge: HOME | End: 2024-08-19
Attending: EMERGENCY MEDICINE
Payer: MEDICARE

## 2024-08-19 ENCOUNTER — PATIENT MESSAGE (OUTPATIENT)
Dept: INTERNAL MEDICINE | Facility: CLINIC | Age: 69
End: 2024-08-19
Payer: MEDICARE

## 2024-08-19 ENCOUNTER — NURSE TRIAGE (OUTPATIENT)
Dept: ADMINISTRATIVE | Facility: CLINIC | Age: 69
End: 2024-08-19
Payer: MEDICARE

## 2024-08-19 VITALS
HEART RATE: 61 BPM | RESPIRATION RATE: 17 BRPM | OXYGEN SATURATION: 98 % | SYSTOLIC BLOOD PRESSURE: 144 MMHG | DIASTOLIC BLOOD PRESSURE: 84 MMHG | WEIGHT: 228 LBS | TEMPERATURE: 99 F | BODY MASS INDEX: 30.88 KG/M2 | HEIGHT: 72 IN

## 2024-08-19 DIAGNOSIS — R07.9 CHEST PAIN, UNSPECIFIED TYPE: Primary | ICD-10-CM

## 2024-08-19 DIAGNOSIS — R07.89 CHEST DISCOMFORT: ICD-10-CM

## 2024-08-19 DIAGNOSIS — R07.9 CHEST PAIN: ICD-10-CM

## 2024-08-19 LAB
ALBUMIN SERPL-MCNC: 3.8 G/DL (ref 3.3–5.5)
ALP SERPL-CCNC: 50 U/L (ref 42–141)
BILIRUB SERPL-MCNC: 0.6 MG/DL (ref 0.2–1.6)
BUN SERPL-MCNC: 15 MG/DL (ref 7–22)
CALCIUM SERPL-MCNC: 9.5 MG/DL (ref 8–10.3)
CHLORIDE SERPL-SCNC: 105 MMOL/L (ref 98–108)
CREAT SERPL-MCNC: 1 MG/DL (ref 0.6–1.2)
GLUCOSE SERPL-MCNC: 100 MG/DL (ref 73–118)
HCT, POC: NORMAL
HGB, POC: NORMAL (ref 14–18)
MCH, POC: NORMAL
MCHC, POC: NORMAL
MCV, POC: NORMAL
MPV, POC: NORMAL
POC ALT (SGPT): 26 U/L (ref 10–47)
POC AST (SGOT): 28 U/L (ref 11–38)
POC B-TYPE NATRIURETIC PEPTIDE: 48.2 PG/ML (ref 0–100)
POC CARDIAC TROPONIN I: 0 NG/ML (ref 0–0.08)
POC PLATELET COUNT: NORMAL
POC PTINR: 1.1 (ref 0.9–1.2)
POC PTWBT: 12.9 SEC (ref 9.7–14.3)
POC TCO2: 31 MMOL/L (ref 18–33)
POTASSIUM BLD-SCNC: 4.1 MMOL/L (ref 3.6–5.1)
PROTEIN, POC: 7.2 G/DL (ref 6.4–8.1)
RBC, POC: NORMAL
RDW, POC: NORMAL
SAMPLE: NORMAL
SAMPLE: NORMAL
SODIUM BLD-SCNC: 141 MMOL/L (ref 128–145)
WBC, POC: NORMAL

## 2024-08-19 PROCEDURE — 93010 ELECTROCARDIOGRAM REPORT: CPT | Mod: HCNC,,, | Performed by: INTERNAL MEDICINE

## 2024-08-19 PROCEDURE — 84484 ASSAY OF TROPONIN QUANT: CPT | Mod: HCNC,ER

## 2024-08-19 PROCEDURE — 85025 COMPLETE CBC W/AUTO DIFF WBC: CPT | Mod: HCNC,ER

## 2024-08-19 PROCEDURE — 93005 ELECTROCARDIOGRAM TRACING: CPT | Mod: HCNC,ER

## 2024-08-19 PROCEDURE — 63600175 PHARM REV CODE 636 W HCPCS: Mod: HCNC,ER | Performed by: EMERGENCY MEDICINE

## 2024-08-19 PROCEDURE — 99284 EMERGENCY DEPT VISIT MOD MDM: CPT | Mod: 25,HCNC,ER

## 2024-08-19 PROCEDURE — 83880 ASSAY OF NATRIURETIC PEPTIDE: CPT | Mod: HCNC,ER

## 2024-08-19 PROCEDURE — 80053 COMPREHEN METABOLIC PANEL: CPT | Mod: HCNC,ER

## 2024-08-19 PROCEDURE — 96374 THER/PROPH/DIAG INJ IV PUSH: CPT | Mod: HCNC,ER

## 2024-08-19 RX ORDER — ORPHENADRINE CITRATE 30 MG/ML
30 INJECTION INTRAMUSCULAR; INTRAVENOUS
Status: COMPLETED | OUTPATIENT
Start: 2024-08-19 | End: 2024-08-19

## 2024-08-19 RX ORDER — METHOCARBAMOL 750 MG/1
1500 TABLET, FILM COATED ORAL 3 TIMES DAILY
Qty: 30 TABLET | Refills: 0 | Status: SHIPPED | OUTPATIENT
Start: 2024-08-19 | End: 2024-08-24

## 2024-08-19 RX ADMIN — ORPHENADRINE CITRATE 30 MG: 60 INJECTION INTRAMUSCULAR; INTRAVENOUS at 05:08

## 2024-08-19 NOTE — TELEPHONE ENCOUNTER
Patient c/o mild, constant chest discomfort x 1 week. Advised per protocol to call 911 now.  Advised the patient to call back with any further questions or if symptoms worsen.        Reason for Disposition   Chest pain lasting longer than 5 minutes and over 44 years old    Additional Information   Negative: SEVERE difficulty breathing (e.g., struggling for each breath, speaks in single words)   Negative: Difficult to awaken or acting confused (e.g., disoriented, slurred speech)   Negative: Shock suspected (e.g., cold/pale/clammy skin, too weak to stand, low BP, rapid pulse)   Negative: Passed out (i.e., lost consciousness, collapsed and was not responding)    Protocols used: Chest Pain-A-[place]

## 2024-08-19 NOTE — DISCHARGE INSTRUCTIONS
Your ER work up did not show any signs of a heart or lung issue, including no heart attack. It may be musculoskeletal - take robaxin as directed, or you may take it only at bedtime. Follow up with your doctor if your pain continues.

## 2024-08-19 NOTE — ED PROVIDER NOTES
Encounter Date: 8/19/2024    SCRIBE #1 NOTE: I, Christine Topete, am scribing for, and in the presence of,  Michelle Wakefield MD.       History     Chief Complaint   Patient presents with    Chest Pain     C/O INTERMITTENT CP X 1 WEEK, TOLD BY PCP TO COME TO ER     Ron RAFAEL Christina is a 69 y.o. male, with a past medical history of DVT x2 (on Eliquis) and HLD, who presents to the ED with intermittent left sided chest discomfort that began 1 week ago with the most recent episode starting earlier today. Discomfort has been constant since this morning. Patient states the pain is not exacerbated with movement or exertion. He cut his grass with no pain yesterday. Patient contacted PCP today who advised evaluation in ED. Patient notes he started taking Nexium 2 weeks ago. Patient denies sweating, nausea, cough, decreased appetite, diarrhea, or constipation. Patient is a former tobacco smoker, admits to occasional EtOH usage, denies illicit drug usage.     The history is provided by the patient. No  was used.     Review of patient's allergies indicates:   Allergen Reactions    Penicillins Other (See Comments)     Past Medical History:   Diagnosis Date    Allergy     COVID-19 07/2021    presumed Delta strain    Deep vein thrombosis     Elevated blood pressure reading without diagnosis of hypertension 09/28/2016    Good home BP log      Other hyperlipidemia 02/13/2014    ASCVD risk score indicates need for statin.  I have counseled the patient on this risk factor as well also recommendation for statin use for reduction of heart attack and stroke risk.  He has verbally acknowledged this information and wishes NOT to pursue statins.  He is aware that this decision leaves him untreated for significant cardiovascular risk      Recurrent deep vein thrombosis (DVT) 10/06/2023    Positive family history.  Known to Hematology.  Recommended long-term DOAC      Umbilical hernia without obstruction and without gangrene  11/22/2019    Small.  Asymptomatic       Past Surgical History:   Procedure Laterality Date    HERNIA REPAIR       Family History   Problem Relation Name Age of Onset    Alcohol abuse Mother      Asthma Father       Social History     Tobacco Use    Smoking status: Never     Passive exposure: Never    Smokeless tobacco: Never   Substance Use Topics    Alcohol use: Yes     Alcohol/week: 2.0 standard drinks of alcohol     Types: 1 Glasses of wine, 1 Shots of liquor per week     Comment: once a month    Drug use: No     Review of Systems   Constitutional:  Negative for activity change, appetite change, chills, diaphoresis and fever.   HENT:  Negative for congestion, rhinorrhea, sneezing and sore throat.    Respiratory:  Negative for cough, chest tightness, shortness of breath and wheezing.    Cardiovascular:  Positive for chest pain. Negative for palpitations and leg swelling.        (+) left sided chest discomfort   Gastrointestinal:  Negative for abdominal pain, constipation, diarrhea, nausea and vomiting.   Skin:  Negative for rash.   Neurological:  Negative for dizziness, light-headedness and headaches.   All other systems reviewed and are negative.      Physical Exam     Initial Vitals [08/19/24 1548]   BP Pulse Resp Temp SpO2   (!) 161/93 74 18 98.5 °F (36.9 °C) 96 %      MAP       --         Physical Exam    Nursing note and vitals reviewed.  Constitutional: He appears well-developed and well-nourished. No distress.   HENT:   Head: Normocephalic and atraumatic.   Eyes: Conjunctivae are normal.   Neck:   Normal range of motion.  Cardiovascular:  Normal rate and regular rhythm.           No murmur heard.  Pulmonary/Chest: Effort normal and breath sounds normal. No respiratory distress.   Abdominal: Bowel sounds are normal. He exhibits no distension. There is no abdominal tenderness.   Musculoskeletal:         General: No tenderness or edema. Normal range of motion.      Cervical back: Normal range of motion.      Neurological: He is alert and oriented to person, place, and time.   Skin: Skin is warm and dry. No rash noted.   Psychiatric: He has a normal mood and affect. His behavior is normal.         ED Course   Procedures  Labs Reviewed   TROPONIN ISTAT       Result Value    POC Cardiac Troponin I 0.00      Sample unknown     POCT CBC    Hematocrit        Hemoglobin        RBC        WBC        MCV        MCH, POC        MCHC        RDW-CV        Platelet Count, POC        MPV       POCT CMP   POCT PROTIME-INR   POCT TROPONIN   POCT B-TYPE NATRIURETIC PEPTIDE (BNP)   ISTAT PROCEDURE    POC PTWBT 12.9      POC PTINR 1.1      Sample unknown     POCT CMP    Albumin, POC 3.8      Alkaline Phosphatase, POC 50      ALT (SGPT), POC 26      AST (SGOT), POC 28      POC BUN 15      Calcium, POC 9.5      POC Chloride 105      POC Creatinine 1.0      POC Glucose 100      POC Potassium 4.1      POC Sodium 141      Bilirubin, POC 0.6      POC TCO2 31      Protein, POC 7.2     POCT B-TYPE NATRIURETIC PEPTIDE (BNP)    POC B-Type Natriuretic Peptide 48.2       EKG Readings: (Independently Interpreted)   Initial Reading: No STEMI. Rhythm: Normal Sinus Rhythm. Heart Rate: 75. Ectopy: PVCs No Ectopy. Conduction: Normal. Other Findings: Prolonged QT Interval. Clinical Impression: Normal Sinus Rhythm with PVCs Other Impression: nonspecific ST and T wave changes. Independently interpreted by me.       Imaging Results              X-Ray Chest PA And Lateral (Final result)  Result time 08/19/24 16:15:49      Final result by Luan Koroma MD (08/19/24 16:15:49)                   Impression:      No acute process.      Electronically signed by: Luan Koroma MD  Date:    08/19/2024  Time:    16:15               Narrative:    EXAMINATION:  XR CHEST PA AND LATERAL    CLINICAL HISTORY:  Chest Pain;    TECHNIQUE:  PA and lateral views of the chest were performed.    COMPARISON:  11/01/2017.    FINDINGS:  The trachea is unremarkable.  The  cardiomediastinal silhouette is within normal limits.  The hilar structures are unremarkable.  There is no evidence of free air beneath hemidiaphragms.  There are no pleural effusions.  There is no evidence of a pneumothorax.  There is no evidence of pneumomediastinum.  No airspace opacity is present.  The osseous structures demonstrate degenerative changes.                                       Medications   orphenadrine injection 30 mg (30 mg Intravenous Given 8/19/24 1736)     Medical Decision Making  69M with DVT x2 (on Eliquis) and HLD, presents with intermittent left sided chest discomfort that began 1 week ago, most recent episode began earlier today. On exam, there are no abnormal findings. In shared decision making with the patient I will order labs, imaging, and EKG. I considered but excluded arrhythmia, acute MI, pneumonia and pneumothorax in my differential diagnosis. Work up is normal. He was given norflex with complete relief of discomfort. May be MSK - will treat with robaxin.    Amount and/or Complexity of Data Reviewed  Labs: ordered. Decision-making details documented in ED Course.  Radiology: ordered. Decision-making details documented in ED Course.  ECG/medicine tests: ordered and independent interpretation performed. Decision-making details documented in ED Course.    Risk  Prescription drug management.            Scribe Attestation:   Scribe #1: I performed the above scribed service and the documentation accurately describes the services I performed. I attest to the accuracy of the note.                             I, Dr. Michlele Wakefield, personally performed the services described in this documentation.   All medical record entries made by the scribe were at my direction and in my presence.   I have reviewed the chart and agree that the record is accurate and complete.   Michelle Wakefield MD.  5:24 PM 08/19/2024     Clinical Impression:  Final diagnoses:  [R07.89] Chest discomfort  [R07.9] Chest  pain  [R07.9] Chest pain, unspecified type (Primary)          ED Disposition Condition    Discharge Stable          ED Prescriptions       Medication Sig Dispense Start Date End Date Auth. Provider    methocarbamoL (ROBAXIN) 750 MG Tab Take 2 tablets (1,500 mg total) by mouth 3 (three) times daily. for 5 days 30 tablet 8/19/2024 8/24/2024 Michelle Wakefield MD          Follow-up Information       Follow up With Specialties Details Why Contact Info    Selvin Rae MD Internal Medicine  If symptoms worsen [address]  [place] [address]  [phone]               Michelle Wakefield MD  08/19/24 3558

## 2024-08-20 LAB
OHS QRS DURATION: 96 MS
OHS QTC CALCULATION: 544 MS

## 2024-09-02 DIAGNOSIS — K29.00 ACUTE GASTRITIS WITHOUT HEMORRHAGE, UNSPECIFIED GASTRITIS TYPE: ICD-10-CM

## 2024-09-02 NOTE — TELEPHONE ENCOUNTER
No care due was identified.  Health [facility] Embedded Care Due Messages. Reference number: [id number].   9/02/2024 4:01:37 AM CDT

## 2024-09-03 RX ORDER — OMEPRAZOLE 40 MG/1
CAPSULE, DELAYED RELEASE ORAL
Qty: 90 CAPSULE | Refills: 0 | Status: SHIPPED | OUTPATIENT
Start: 2024-09-03

## 2024-09-03 NOTE — TELEPHONE ENCOUNTER
Refill Routing Note   Medication(s) are not appropriate for processing by [facility] for the following reason(s):        New or recently adjusted medication    [facility] action(s):  Defer               Appointments  past 12m or future 3m with PCP    Date Provider   Last Visit   8/5/2024 Selvin Rae MD   Next Visit   Visit date not found Selvin Rae MD   ED visits in past 90 days: 1        Note composed:9:17 AM 09/03/2024

## 2024-11-15 ENCOUNTER — PATIENT MESSAGE (OUTPATIENT)
Dept: INTERNAL MEDICINE | Facility: CLINIC | Age: 69
End: 2024-11-15
Payer: MEDICARE

## 2024-11-20 ENCOUNTER — OFFICE VISIT (OUTPATIENT)
Dept: INTERNAL MEDICINE | Facility: CLINIC | Age: 69
End: 2024-11-20
Payer: MEDICARE

## 2024-11-20 ENCOUNTER — HOSPITAL ENCOUNTER (OUTPATIENT)
Dept: RADIOLOGY | Facility: HOSPITAL | Age: 69
Discharge: HOME | End: 2024-11-20
Attending: INTERNAL MEDICINE
Payer: MEDICARE

## 2024-11-20 VITALS
DIASTOLIC BLOOD PRESSURE: 86 MMHG | OXYGEN SATURATION: 98 % | HEART RATE: 72 BPM | SYSTOLIC BLOOD PRESSURE: 126 MMHG | BODY MASS INDEX: 31.04 KG/M2 | WEIGHT: 229.19 LBS | HEIGHT: 72 IN

## 2024-11-20 DIAGNOSIS — M25.512 ACUTE PAIN OF LEFT SHOULDER: Primary | ICD-10-CM

## 2024-11-20 DIAGNOSIS — M25.512 ACUTE PAIN OF LEFT SHOULDER: ICD-10-CM

## 2024-11-20 DIAGNOSIS — G47.9 SLEEP DISTURBANCE: ICD-10-CM

## 2024-11-20 PROCEDURE — 3074F SYST BP LT 130 MM HG: CPT | Mod: HCNC,CPTII,S$GLB, | Performed by: INTERNAL MEDICINE

## 2024-11-20 PROCEDURE — 99999 PR PBB SHADOW E&M-EST. PATIENT-LVL IV: CPT | Mod: PBBFAC,HCNC,, | Performed by: INTERNAL MEDICINE

## 2024-11-20 PROCEDURE — 73030 X-RAY EXAM OF SHOULDER: CPT | Mod: TC,HCNC,LT

## 2024-11-20 PROCEDURE — 3008F BODY MASS INDEX DOCD: CPT | Mod: HCNC,CPTII,S$GLB, | Performed by: INTERNAL MEDICINE

## 2024-11-20 PROCEDURE — 73030 X-RAY EXAM OF SHOULDER: CPT | Mod: 26,HCNC,LT, | Performed by: INTERNAL MEDICINE

## 2024-11-20 PROCEDURE — 1160F RVW MEDS BY RX/DR IN RCRD: CPT | Mod: HCNC,CPTII,S$GLB, | Performed by: INTERNAL MEDICINE

## 2024-11-20 PROCEDURE — 3288F FALL RISK ASSESSMENT DOCD: CPT | Mod: HCNC,CPTII,S$GLB, | Performed by: INTERNAL MEDICINE

## 2024-11-20 PROCEDURE — 99214 OFFICE O/P EST MOD 30 MIN: CPT | Mod: HCNC,S$GLB,, | Performed by: INTERNAL MEDICINE

## 2024-11-20 PROCEDURE — 1126F AMNT PAIN NOTED NONE PRSNT: CPT | Mod: HCNC,CPTII,S$GLB, | Performed by: INTERNAL MEDICINE

## 2024-11-20 PROCEDURE — 3079F DIAST BP 80-89 MM HG: CPT | Mod: HCNC,CPTII,S$GLB, | Performed by: INTERNAL MEDICINE

## 2024-11-20 PROCEDURE — 3044F HG A1C LEVEL LT 7.0%: CPT | Mod: HCNC,CPTII,S$GLB, | Performed by: INTERNAL MEDICINE

## 2024-11-20 PROCEDURE — 1159F MED LIST DOCD IN RCRD: CPT | Mod: HCNC,CPTII,S$GLB, | Performed by: INTERNAL MEDICINE

## 2024-11-20 PROCEDURE — 1101F PT FALLS ASSESS-DOCD LE1/YR: CPT | Mod: HCNC,CPTII,S$GLB, | Performed by: INTERNAL MEDICINE

## 2024-11-20 NOTE — PROGRESS NOTES
Assessment & Plan  1. Acute pain of left shoulder  -   I will get another x-ray of his shoulder since his last 1 was 6 years ago.  I suspect he may have a component of impingement in addition to his AC joint degenerative changes.  We discussed ice and over-the-counter Voltaren gel.  Also refer him to physical therapy  -     X-Ray Shoulder 2 or More Views Left; Future; Expected date: 11/20/2024  -     Ambulatory referral/consult to Physical/Occupational Therapy; Future; Expected date: 11/27/2024    2. Sleep disturbance -   No issues with falling asleep.  He frequently wakes up in his [email or web address] organization.  He reports he is taking Benadryl once or twice every 2 weeks.  He is satisfied with his treatment plan. I have discussed the common side effects of this(these) medication(s) and black box warnings (if applicable) with the patient and answered all of the questions they had at the time of this visit regarding this(these) medication(s).             Health Maintenance reviewed, up-to-date.    Follow-up: Follow up if symptoms worsen or fail to improve.  ______________________________________________________________________    Chief Complaint  Chief Complaint   Patient presents with    Shoulder Pain     left       HPI  Ron Christina is a 69 y.o. male with medical diagnoses as listed in the medical history and problem list that presents for left shoulder pain x 2 weeks.  Pt is known to me with their last appointment 8/5/2024.      He was doing pushups and had the sudden onset of left shoulder pain associated with a tearing sensation/sound.  No pain at rest.  Anterior shoulder pain with flexion of the shoulder.  There's a pop sensation around 90 degrees and if he continues with flexion and the pain decreases.        ROS  Review of Systems        Physical Exam  Vitals:    11/20/24 1016   BP: 126/86   BP Location: Left arm   Patient Position: Sitting   Pulse: 72   SpO2: 98%   Weight: 103.9 kg (229 lb 2.7 oz)    Height: 6' (1.829 m)    Body mass index is 31.08 kg/m².  Weight: 103.9 kg (229 lb 2.7 oz)   Height: 6' (182.9 cm)   Physical Exam  Constitutional:       General: He is not in acute distress.     Appearance: Normal appearance. He is well-developed.   HENT:      Head: Normocephalic and atraumatic.   Eyes:      Extraocular Movements: Extraocular movements intact.      Conjunctiva/sclera: Conjunctivae normal.   Pulmonary:      Effort: Pulmonary effort is normal. No respiratory distress.   Musculoskeletal:      Right shoulder: No tenderness, bony tenderness or crepitus. Normal range of motion.      Left shoulder: Tenderness and crepitus present. No bony tenderness. Normal range of motion.      Cervical back: Normal range of motion.      Comments: Right Shoulder - no warmth, erythema, or TTP of clavicle, AC joint, lateral deltoid.  No TTP of biceps tendon.  FROM without pain.  Negative radial arc and drop arm.  No pain with cross-body AC joint loading.  Negative shoulder apprehension test.  Negative Speed's Neer's and Costello    Left shoulder - no warmth, erythema, or TTP of clavicle, AC joint, lateral deltoid.  No TTP of biceps tendon.  FROM without pain.  Negative radial arc and drop arm.  No pain with cross-body AC joint loading.  Negative shoulder apprehension test.  Neer's test positive.  Negative Speed's and Costello maneuver     Neurological:      General: No focal deficit present.      Mental Status: He is alert and oriented to person, place, and time.   Psychiatric:         Mood and Affect: Mood and affect normal.         Behavior: Behavior normal.         Thought Content: Thought content normal.         Judgment: Judgment normal.

## 2024-11-20 NOTE — PROGRESS NOTES
The arthritis that was seen ~6 years ago is a bit worse.  This doesn't change our treatment plan but can help us understand why there's still some pain, albeit mild, that is still happening after the event where you felt the tearing sensation.     Sincerely,  Selvin Rae MD

## 2024-11-22 ENCOUNTER — CLINICAL SUPPORT (OUTPATIENT)
Dept: REHABILITATION | Facility: HOSPITAL | Age: 69
End: 2024-11-22
Attending: INTERNAL MEDICINE
Payer: MEDICARE

## 2024-11-22 DIAGNOSIS — M25.512 ACUTE PAIN OF LEFT SHOULDER: Primary | ICD-10-CM

## 2024-11-22 PROCEDURE — 97165 OT EVAL LOW COMPLEX 30 MIN: CPT | Mod: HCNC,PN

## 2024-11-22 PROCEDURE — 97530 THERAPEUTIC ACTIVITIES: CPT | Mod: HCNC,PN

## 2024-11-22 NOTE — PLAN OF CARE
"[facility]  Occupational Therapy Initial Evaluation      Name: Ron Christina  Clinic Number: [id number]    Therapy Diagnosis:   Encounter Diagnosis   Name Primary?    Acute pain of left shoulder Yes     Physician: Selvin Rae MD    Physician Orders: Eval and Treat  Medical Diagnosis: M25.512 (ICD-10-CM) - Acute pain of left shoulder  Date/Mechanism of Injury: Insidious onset following push-ups the first week of November   Evaluation Date: 11/22/2024  Insurance Authorization Period Expiration: TBD  Plan of Care Certification Period: 11/22/24-1/31/25  Progress Note Due: 12/22/24  Date of Return to MD: TBD  Visit # / Visits authorized: 1/1   FOTO: Initial evaluation/6.7    Precautions:  Standard    Time In:10:05  Time Out: 10:45  Total Appointment Time (timed & untimed codes): 40 minutes    Subjective      Date of Onset: 1st week of November     History of Current Condition/Mechanism of Injury: Ron reports: "I started increasing my push-up reps about 2 weeks ago and noticed a pop. I'm not trying to get big or be a - I just want to stay fit.  I did happen to injure my left biceps about 10 years ago (Pt demonstrates distal biceps tendon injury: "popyeye sign"), but I never had too much trouble with it, so I didn't fix it.  Honestly now, I'm not having too much more pain. I just want to learn how to get back to working out safely."    Falls: No    Involved Side: left  Dominant Side: Right    Mechanism/Date of Injury: Body weight exercise approx. 2 weeks prior   Imaging: X-Rays 11/20/24 Left shoulder  "EXAMINATION:  XR SHOULDER COMPLETE 2 OR MORE VIEWS LEFT     CLINICAL HISTORY:  anterior pain after popping sensation during exercise;Pain in left shoulder     TECHNIQUE:  Two or three views of the left shoulder were performed.     COMPARISON:  January 2018     FINDINGS:  There is osseous demineralization.  Moderate osteoarthritic degenerative changes present at the " "acromioclavicular joint, more pronounced as compared to previous imaging.  There is no dislocation or evidence of fracture."    Prior Therapy: No    Pain:  Functional Pain Scale Rating 0-10:   1/10 on average  0/10 at best  1/10 at worst  Location: L anterior shoulder   Description: Mild and improving   Aggravating Factors: lifting shoulder above shoulder height  Easing Factors: N/A    Occupation:  company owner  Working presently: self-employed  Duties: picking up boxes daily (approx. 50 lbs on average), clerical work due to payroll ; 50/50 split between     Functional Limitations/Social History:    Previous functional status includes: Independent with all ADLs.     Current Functional Status   Home/Living environment: lives with their spouse        Limitation of Functional Status as follows:   ADLs/IADLs:     - Feeding: (I)    - Bathing: (I)    - Dressing/Grooming: (I)    - Home Management: (I)    - Driving: (I)     Leisure: spending time with family; working out     Patient's Goals for Therapy: Education on how to train at home safely     Past Medical History/Physical Systems Review:   Ron Christina  has a past medical history of Allergy, COVID-19, Deep vein thrombosis, Elevated blood pressure reading without diagnosis of hypertension, Other hyperlipidemia, Recurrent deep vein thrombosis (DVT), and Umbilical hernia without obstruction and without gangrene.    Ron Christina  has a past surgical history that includes Hernia repair.    Ron has a current medication list which includes the following prescription(s): eliquis.    Review of patient's allergies indicates:   Allergen Reactions    Penicillins Other (See Comments)        Objective      Hand dominance: Right    Sensation Test: Patient denies any numbness/tingling    Observation/Inspection:  normal muscle tone for age    Range of Motion:   Right: WNL  Left: WNL     (L) UE (R) UE     AROM AROM Norm   Shoulder Flexion  170 degrees 170 degrees     0-180 " degrees   Shoulder Abduction 170 degrees 170 degrees 0-180 degrees   Shoulder Extension 60 degrees 60 degrees 0-60 degrees   Shoulder Functional Internal Rotation T12 T12    Shoulder External Rotation 75 degrees 75 degrees 0-90 degrees       Strength  Shoulder Flexion RUE: 5/5   Shoulder Extension RUE: 5/5   Shoulder Abduction RUE:  5/5   Shoulder Internal Rotation RUE: 5/5   External Rotation RUE: 5/5   Shoulder Flexion LUE: 5/5   Shoulder Extension LUE: 5/5   Shoulder Abduction LUE: 5/5   Shoulder Internal Rotation LUE:  5/5   External Rotation LUE:  5/5     Special Tests:  Negative: Neer Impingement and Empty can test    Limitation/Restriction for FOTO Shoulder Survey    Therapist reviewed FOTO scores for Ron Christina on 11/22/2024.   FOTO documents entered into [employer] - see Media section.    Limitation Score: 6.7       Treatment      Total Treatment time (time-based codes) separate from Evaluation: 10 minutes    Ron received the treatments listed below:      therapeutic activities to improve functional performance for 10 minutes, including:  -Education on appropriate modality use for current phase of injury as well as activity precautions/joint protection measures while recovering from biceps tendon injury     Patient Education     Education provided:   -role of OT, goals for OT, scheduling/cancellations, insurance limitations with patient.    Assessment      Ron Christina is a 69 y.o. male referred to outpatient occupational therapy and presents with a medical diagnosis of M25.512 (ICD-10-CM) - Acute pain of left shoulder.    Following medical record review it is determined that pt will benefit from occupational therapy services in order to maximize pain free and/or functional use of his BUE. The following goals were discussed with the patient and patient is in agreement with them as to be addressed in the treatment plan. The patient's rehab potential is Excellent.     Anticipated barriers to Occupational  Therapy: None noted     Plan of care discussed with patient: Yes  Patient's spiritual, cultural and educational needs considered and patient is agreeable to the plan of care and goals as stated below:     Medical Necessity is demonstrated by the following  Occupational Profile/History  Co-morbidities and personal factors that may impact the plan of care [x] LOW: Brief chart review  [] MODERATE: Expanded chart review   [] HIGH: Extensive chart review    Moderate / High Support Documentation: N/A     Examination  Performance deficits relating to physical, cognitive or psychosocial skills that result in activity limitations and/or participation restrictions  [x] LOW: addressing 1-3 Performance deficits  [] MODERATE: 3-5 Performance deficits  [] HIGH: 5+ Performance deficits (please support below)    Moderate / High Support Documentation:    Physical:  Biceps tendonitis and pain     Cognitive:  No Deficits    Psychosocial:    No Deficits     Treatment Options [x] LOW: Limited options  [] MODERATE: Several options  [] HIGH: Multiple options      Decision Making/ Complexity Score: low       The following goals were discussed with the patient and patient is in agreement with them as to be addressed in the treatment plan.       Long Term Goals (to be met in 4 weeks or by DC):  1. Patient will be Independent and compliant with HEP & attendance for duration of therapy.   2. Patient will report <0/10 pain in all shoulder planes of motion and with resistive activity.   3. Patient will report increase in functional use of left UE as evidenced by the FOTO outcome measure by 5 points.     Plan     Plan of Care Certification: 11/22/2024 to 1/31/2025.     Outpatient Occupational Therapy 1 time weekly for 6 weeks to include the following interventions: Therapeutic exercises/activities and home exercise program provision .      Thank you for allowing me to assist in the care of your Patient. If you have any questions or concerns,  please don't hesitate to e-mail or contact me directly.      SHAHNAZ Faith/L  [facility]  Phone: [phone]  Fax: [phone]

## 2024-11-29 DIAGNOSIS — K29.00 ACUTE GASTRITIS WITHOUT HEMORRHAGE, UNSPECIFIED GASTRITIS TYPE: ICD-10-CM

## 2024-11-29 RX ORDER — OMEPRAZOLE 40 MG/1
CAPSULE, DELAYED RELEASE ORAL
Qty: 90 CAPSULE | Refills: 0 | OUTPATIENT
Start: 2024-11-29

## 2024-11-29 NOTE — TELEPHONE ENCOUNTER
No care due was identified.  Health [facility] Embedded Care Due Messages. Reference number: [id number].   11/29/2024 4:01:02 AM CST

## 2024-11-29 NOTE — TELEPHONE ENCOUNTER
Refill Decision Note   Ron Sanford  is requesting a refill authorization.  Brief Assessment and Rationale for Refill:  Quick Discontinue     Medication Therapy Plan: Omeprazole dc'd on 11/20/24 by PCP      Comments:     Note composed:12:23 PM 11/29/2024

## 2024-11-30 ENCOUNTER — PATIENT MESSAGE (OUTPATIENT)
Dept: HEMATOLOGY/ONCOLOGY | Facility: CLINIC | Age: 69
End: 2024-11-30
Payer: MEDICARE

## 2024-12-05 ENCOUNTER — CLINICAL SUPPORT (OUTPATIENT)
Dept: REHABILITATION | Facility: HOSPITAL | Age: 69
End: 2024-12-05
Payer: MEDICARE

## 2024-12-05 DIAGNOSIS — M25.512 ACUTE PAIN OF LEFT SHOULDER: Primary | ICD-10-CM

## 2024-12-05 PROCEDURE — 97530 THERAPEUTIC ACTIVITIES: CPT | Mod: HCNC,PN

## 2024-12-05 PROCEDURE — 97112 NEUROMUSCULAR REEDUCATION: CPT | Mod: HCNC,PN

## 2024-12-05 PROCEDURE — 97110 THERAPEUTIC EXERCISES: CPT | Mod: HCNC,PN

## 2024-12-05 NOTE — PROGRESS NOTES
"[facility]  Occupational Therapy Treatment Note     Date: 12/5/2024  Name: Ron Christina  Clinic Number: [id number]    Therapy Diagnosis:   Encounter Diagnosis   Name Primary?    Acute pain of left shoulder Yes     Physician: Selvin Rae MD    Physician Orders: Eval and Treat  Medical Diagnosis: M25.512 (ICD-10-CM) - Acute pain of left shoulder  Date/Mechanism of Injury: Insidious onset following push-ups the first week of November   Evaluation Date: 11/22/2024  Insurance Authorization Period Expiration: 12/31/24  Plan of Care Certification Period: 11/22/24-1/31/25  Progress Note Due: 12/22/24  Date of Return to MD: TBD  Visit # / Visits authorized: 1/10  FOTO: Initial evaluation/6.7     Precautions:  Standard    Time In: 10:47  Time Out: 11:30  Total Billable Time: 43 minutes    Subjective     Pt reports: "Kitty been lifting a lot, I've been doing a lot. I'm having more pain."    He was compliant with home exercise program given last session. N/A    Response to previous treatment:N/A    Functional change: N/A    Pain: 1/10  Location: left shoulder     Objective     Objective Measures updated at progress report unless specified.    Treatment     Ron received the treatments listed below:      Therapeutic exercises to develop strength, endurance, posture, and core stabilization for 10 minutes including:  -Pulleys for shoulder abduction x 3 min  -30# scapular retraction/rows narrow and wide  x 3 min   -Wall-assisted scapular push-ups   -Rest breaks as needed     Therapeutic activities to improve functional performance for 23 minutes, including:  -Education on joint protection for both upper body and lower body strength training  -Demonstration and correction of proper form with squats and lunges focusing on quality of movement with ADLs and work-related tasks      Neuromuscular re-education activities to improve: Posture for 10 minutes. The following activities were " included:  -30# scapular retraction/rows narrow and wide  x 2 min, 2 sets   -Rest breaks in between sets     Patient Education and Home Exercises     Education provided:   - Importance of compliance w/ HEP to maximize gains   - Progress towards goals     Written Home Exercises Provided: yes.  Exercises were reviewed and Ron was able to demonstrate them prior to the end of the session.  Ron demonstrated good  understanding of the HEP provided. See EMR under Patient Instructions for exercises provided during therapy today's session.       Assessment     Pt would continue to benefit from skilled OT. Pt tolerated session well, noting mod VCs and tactile cues for proper form with all exercises demonstrated today, particularly to widen base of support and reduce angle of knee.     Ron is progressing well towards his goals and there are no updates to goals at this time. Pt prognosis is Excellent.     Pt will continue to benefit from skilled Outpatient Occupational Therapy to address the deficits listed in the problem list on initial evaluation provide Pt/family education and to maximize Pt's level of independence in the home and community environment.     Pt's spiritual, cultural and educational needs considered and pt agreeable to plan of care and goals.    Anticipated barriers to occupational therapy: None noted       Long Term Goals (to be met in 4 weeks or by DC):  1. Patient will be Independent and compliant with HEP & attendance for duration of therapy.   2. Patient will report <0/10 pain in all shoulder planes of motion and with resistive activity.   3. Patient will report increase in functional use of left UE as evidenced by the FOTO outcome measure by 5 points.       Plan     Patient is to be treated by Occupational Therapy 1 time per week for 6 weeks during the certification period from 11/22/24 to 1/31/25 to achieve the established goals.       Updates/Grading for next session: TBD pending  progress      YUVAL Faith

## 2024-12-06 ENCOUNTER — TELEPHONE (OUTPATIENT)
Dept: HEMATOLOGY/ONCOLOGY | Facility: HOSPITAL | Age: 69
End: 2024-12-06
Payer: MEDICARE

## 2024-12-06 DIAGNOSIS — I82.409 RECURRENT DEEP VEIN THROMBOSIS (DVT): Primary | ICD-10-CM

## 2024-12-13 ENCOUNTER — PATIENT MESSAGE (OUTPATIENT)
Dept: INTERNAL MEDICINE | Facility: CLINIC | Age: 69
End: 2024-12-13
Payer: MEDICARE

## 2024-12-20 ENCOUNTER — CLINICAL SUPPORT (OUTPATIENT)
Dept: REHABILITATION | Facility: HOSPITAL | Age: 69
End: 2024-12-20
Payer: MEDICARE

## 2024-12-20 DIAGNOSIS — M25.512 ACUTE PAIN OF LEFT SHOULDER: Primary | ICD-10-CM

## 2024-12-20 PROCEDURE — 97110 THERAPEUTIC EXERCISES: CPT | Mod: HCNC,PN

## 2024-12-20 NOTE — PROGRESS NOTES
"[facility]  Occupational Therapy Treatment Note     Date: 12/20/2024  Name: Ron Christina  Clinic Number: [id number]    Therapy Diagnosis:   Encounter Diagnosis   Name Primary?    Acute pain of left shoulder Yes     Physician: Selvin Rae MD    Physician Orders: Eval and Treat  Medical Diagnosis: M25.512 (ICD-10-CM) - Acute pain of left shoulder  Date/Mechanism of Injury: Insidious onset following push-ups the first week of November   Evaluation Date: 11/22/2024  Insurance Authorization Period Expiration: 12/31/24  Plan of Care Certification Period: 11/22/24-1/31/25  Progress Note Due: 12/22/24  Date of Return to MD: TBD  Visit # / Visits authorized: 2/10  FOTO: Initial evaluation/6.7     Precautions:  Standard    Time In: 12:20  Time Out: 13:00  Total Billable Time: 40 minutes    Subjective     Pt reports: "My bicep has been kind of achy. It hurts through the top of my shoulder."     He was compliant with home exercise program given last session.     Response to previous treatment:N/A    Functional change: N/A    Pain: 1/10  Location: left shoulder     Objective     Objective Measures updated at progress report unless specified.    Treatment     Ron received the treatments listed below:      Therapeutic exercises to develop strength, endurance, posture, and core stabilization for 40 minutes including:  -Pulleys for shoulder abduction x 3 min  -30# scapular retraction/rows narrow and wide  x 3 min   -Theraband (green) for B shoulder external & internal rotation x 3 min each   -UBE Level 2 x 10 min    -Rest breaks as needed     Patient Education and Home Exercises     Education provided:   - Importance of compliance w/ HEP to maximize gains   - Progress towards goals     Written Home Exercises Provided: yes.  Exercises were reviewed and Ron was able to demonstrate them prior to the end of the session.  Ron demonstrated good  understanding of the HEP provided. See EMR " under Patient Instructions for exercises provided during therapy today's session.       Assessment     Pt would continue to benefit from skilled OT. Pt tolerated session well, noting no increased shoulder pain reported with today's upgraded exercises.     Ron is progressing well towards his goals and there are no updates to goals at this time. Pt prognosis is Excellent.     Pt will continue to benefit from skilled Outpatient Occupational Therapy to address the deficits listed in the problem list on initial evaluation provide Pt/family education and to maximize Pt's level of independence in the home and community environment.     Pt's spiritual, cultural and educational needs considered and pt agreeable to plan of care and goals.    Anticipated barriers to occupational therapy: None noted       Long Term Goals (to be met in 4 weeks or by DC):  1. Patient will be Independent and compliant with HEP & attendance for duration of therapy.   2. Patient will report <0/10 pain in all shoulder planes of motion and with resistive activity.   3. Patient will report increase in functional use of left UE as evidenced by the FOTO outcome measure by 5 points.       Plan     Patient is to be treated by Occupational Therapy 1 time per week for 6 weeks during the certification period from 11/22/24 to 1/31/25 to achieve the established goals.       Updates/Grading for next session: TBD pending progress      YUVAL Faith

## 2024-12-27 ENCOUNTER — DOCUMENTATION ONLY (OUTPATIENT)
Dept: REHABILITATION | Facility: HOSPITAL | Age: 69
End: 2024-12-27
Payer: MEDICARE

## 2024-12-27 ENCOUNTER — PATIENT MESSAGE (OUTPATIENT)
Dept: INTERNAL MEDICINE | Facility: CLINIC | Age: 69
End: 2024-12-27
Payer: MEDICARE

## 2024-12-27 ENCOUNTER — CLINICAL SUPPORT (OUTPATIENT)
Dept: REHABILITATION | Facility: HOSPITAL | Age: 69
End: 2024-12-27
Payer: MEDICARE

## 2024-12-27 DIAGNOSIS — M25.512 ACUTE PAIN OF LEFT SHOULDER: Primary | ICD-10-CM

## 2024-12-27 NOTE — PROGRESS NOTES
Pt requested to cancel his upcoming therapy visits, as well as today's visit, due to concerns that his shoulder condition is worsening. Pt would like an MRI of his left shoulder due to increased pain with shoulder elevation, as well as a new onset in the past week of a bony protrusion near the AC joint region, w/ no associated additional injury or trauma. Pt reports pain in his scapular region as well. Referring provider will be contacted to request a referral to Orthopedics.     SHAHNAZ Faith/L  [facility]

## 2024-12-27 NOTE — PROGRESS NOTES
"[facility]  Occupational Therapy Treatment Note     Date: 12/27/2024  Name: Ron Christina  Clinic Number: [id number]    Therapy Diagnosis:   Encounter Diagnosis   Name Primary?    Acute pain of left shoulder Yes     Physician: Selvin Rae MD    Physician Orders: Eval and Treat  Medical Diagnosis: M25.512 (ICD-10-CM) - Acute pain of left shoulder  Date/Mechanism of Injury: Insidious onset following push-ups the first week of November   Evaluation Date: 11/22/2024  Insurance Authorization Period Expiration: 12/31/24  Plan of Care Certification Period: 11/22/24-1/31/25  Progress Note Due: 12/22/24  Date of Return to MD: TBD  Visit # / Visits authorized: 3/10  FOTO: Initial evaluation/6.7     Precautions:  Standard    Time In: ***  Time Out: ***  Total Billable Time: *** minutes    Subjective     Pt reports: "My bicep has been kind of achy. It hurts through the top of my shoulder."     He was compliant with home exercise program given last session.     Response to previous treatment:N/A    Functional change: N/A    Pain: 1/10  Location: left shoulder     Objective     Objective Measures updated at progress report unless specified.    Treatment     Ron received the treatments listed below:      Therapeutic exercises to develop strength, endurance, posture, and core stabilization for *** minutes including:  -Pulleys for shoulder abduction x 3 min  -30# scapular retraction/rows narrow and wide  x 3 min   -Theraband (green) for B shoulder external & internal rotation x 3 min each   -UBE Level 2 x 10 min    -Rest breaks as needed     Patient Education and Home Exercises     Education provided:   - Importance of compliance w/ HEP to maximize gains   - Progress towards goals     Written Home Exercises Provided: yes.  Exercises were reviewed and Ron was able to demonstrate them prior to the end of the session.  Ron demonstrated good  understanding of the HEP provided. See EMR under " Patient Instructions for exercises provided during therapy today's session.       Assessment     Pt would continue to benefit from skilled OT. Pt tolerated session well, noting no increased shoulder pain reported with today's upgraded exercises.     Ron is progressing well towards his goals and there are no updates to goals at this time. Pt prognosis is Excellent.     Pt will continue to benefit from skilled Outpatient Occupational Therapy to address the deficits listed in the problem list on initial evaluation provide Pt/family education and to maximize Pt's level of independence in the home and community environment.     Pt's spiritual, cultural and educational needs considered and pt agreeable to plan of care and goals.    Anticipated barriers to occupational therapy: None noted       Long Term Goals (to be met in 4 weeks or by DC):  1. Patient will be Independent and compliant with HEP & attendance for duration of therapy.   2. Patient will report <0/10 pain in all shoulder planes of motion and with resistive activity.   3. Patient will report increase in functional use of left UE as evidenced by the FOTO outcome measure by 5 points.       Plan     Patient is to be treated by Occupational Therapy 1 time per week for 6 weeks during the certification period from 11/22/24 to 1/31/25 to achieve the established goals.       Updates/Grading for next session: TBD pending progress      YUVAL Faith

## 2025-01-03 NOTE — PROGRESS NOTES
NEW PATIENT    HISTORY OF PRESENT ILLNESS   69 y.o. Male with a history of left shoulder pain who is a  and is right hand dominant. He states he hurt his shoulder in early November 2024 while doing a push up. He was seen by his PCP and was prescribed OT.  Pain is located all around the shoulder. Pain wakes him up at night. Patient did attend PT for a 3 weeks as the pain was getting worse.     + mechanical symptoms, - instability    Is affecting ADLs.  Pain is 4/10 at it's worst.        PAST MEDICAL HISTORY    Past Medical History:   Diagnosis Date    Allergy     COVID-19 07/2021    presumed Delta strain    Deep vein thrombosis     Elevated blood pressure reading without diagnosis of hypertension 09/28/2016    Good home BP log      Other hyperlipidemia 02/13/2014    ASCVD risk score indicates need for statin.  I have counseled the patient on this risk factor as well also recommendation for statin use for reduction of heart attack and stroke risk.  He has verbally acknowledged this information and wishes NOT to pursue statins.  He is aware that this decision leaves him untreated for significant cardiovascular risk      Recurrent deep vein thrombosis (DVT) 10/06/2023    Positive family history.  Known to Hematology.  Recommended long-term DOAC      Umbilical hernia without obstruction and without gangrene 11/22/2019    Small.  Asymptomatic         PAST SURGICAL HISTORY     Past Surgical History:   Procedure Laterality Date    HERNIA REPAIR         FAMILY HISTORY    Family History   Problem Relation Name Age of Onset    Alcohol abuse Mother      Asthma Father         SOCIAL HISTORY    Social History     Socioeconomic History    Marital status:    Tobacco Use    Smoking status: Never     Passive exposure: Never    Smokeless tobacco: Never   Substance and Sexual Activity    Alcohol use: Yes     Alcohol/week: 2.0 standard drinks of alcohol     Types: 1 Glasses of wine, 1 Shots of liquor per week      Comment: once a month    Drug use: No    Sexual activity: Not Currently     Social Drivers of Health     Financial Resource Strain: Low Risk  (4/22/2024)    Overall Financial Resource Strain (CARDIA)     Difficulty of Paying Living Expenses: Not hard at all   Food Insecurity: No Food Insecurity (4/22/2024)    Hunger Vital Sign     Worried About Running Out of Food in the Last Year: Never true     Ran Out of Food in the Last Year: Never true   Transportation Needs: No Transportation Needs (4/22/2024)    PRAPARE - Transportation     Lack of Transportation (Medical): No     Lack of Transportation (Non-Medical): No   Physical Activity: Insufficiently Active (4/22/2024)    Exercise Vital Sign     Days of Exercise per Week: 2 days     Minutes of Exercise per Session: 40 min   Stress: Stress Concern Present (4/22/2024)    [employer] [place] [employer] - Occupational Stress Questionnaire     Feeling of Stress : To some extent   Housing Stability: Unknown (4/22/2024)    Housing Stability Vital Sign     Unable to Pay for Housing in the Last Year: No       MEDICATIONS      Current Outpatient Medications:     ELIQUIS 2.5 mg Tab, TAKE 1 TABLET(2.5 MG) BY MOUTH TWICE DAILY, Disp: 60 tablet, Rfl: 5    ALLERGIES     Review of patient's allergies indicates:   Allergen Reactions    Penicillins Other (See Comments)         REVIEW OF SYSTEMS   Constitution: Negative. Negative for chills, fever and night sweats.   HENT: Negative for congestion and headaches.    Eyes: Negative for blurred vision, left vision loss and right vision loss.   Cardiovascular: Negative for chest pain and syncope.   Respiratory: Negative for cough and shortness of breath.    Endocrine: Negative for polydipsia, polyphagia and polyuria.   Hematologic/Lymphatic: Negative for bleeding problem. Does not bruise/bleed easily.   Skin: Negative for dry skin, itching and rash.   Musculoskeletal: Negative for falls. Positive for left shoulder pain    Gastrointestinal: Negative for abdominal pain and bowel incontinence.   Genitourinary: Negative for bladder incontinence and nocturia.   Neurological: Negative for disturbances in coordination, loss of balance and seizures.   Psychiatric/Behavioral: Negative for depression. The patient does not have insomnia.    Allergic/Immunologic: Negative for hives and persistent infections.     PHYSICAL EXAMINATION    Vitals: BP (!) 144/91   Pulse 82   Resp 17   Ht 6' (1.829 m)   Wt 102.1 kg (225 lb)   BMI 30.52 kg/m²     General: The patient appears active and healthy with no apparent physical problems.  No disturbance of mood or affect is demonstrated. Alert and Oriented.    HEENT: Eyes normal, pupils equally round, nose normal.    Resp: Equal and symmetrical chest rises. No wheezing    CV: Regular rate    Neck: Supple; nonpainful range of motion.    Extremities: no cyanosis, clubbing, edema, or diffuse swelling.  Palpable pulses, good capillary refill of the digits.  No coolness, discoloration, edema or obvious varicosities.    Skin: no lesions noted.    Lymphatic: no detected adenopathy in the upper or lower extremities.    Neurologic: normal mental status, normal reflexes, normal gait and balance.  Patient is alert and oriented to person, place and time.  No flaccidity or spasticity is noted.  No motor or sensory deficits are noted.  Light touch is intact    Orthopaedic: SHOULDER EXAM - left    Inspection:   Normal skin color and appearance with no scars.  No muscle atrophy noted.  No scapular winging.      Palpation: No tenderness of the acromioclavicular joint, lateral edge of the acromion, biceps tendon, trapezius muscle or scapulothoracic bursa.      ROM:      PROM:     FE - 170°    Abd/ER -  90°  Abd/IR -  45°   Add/ER -  60°     AROM:    FE - 180°    Abd/ER -  90°  Abd/IR -  45°   Add/ER -  60°     Pain between 100 and 130° of forward elevation    Tests:     - Costello, - Neer's, - Cross Arm Adduction, +  Pickens, - Yerguson, - Speed. + Belly Press,  + Jobes, + Lift Off    Stability: - sulcus, - apprehension, - relocation, - load and shift, - DLS      Motor:  Rotator cuff strength is 4/5 supraspinatus, 4/5 infraspinatus, 4/5 subscapularis. Biceps, triceps and deltoid strength is 5/5.      Neuro     Distally there are no paresthesias, and sensation is intact to light touch in the median, ulnar, and radial distributions.  Reflexes are 2/2 biceps, triceps and brachioradialis.        IMAGING    EXAMINATION:  XR SHOULDER COMPLETE 2 OR MORE VIEWS LEFT     CLINICAL HISTORY:  anterior pain after popping sensation during exercise;Pain in left shoulder     TECHNIQUE:  Two or three views of the left shoulder were performed.     COMPARISON:  January 2018     FINDINGS:  There is osseous demineralization.  Moderate osteoarthritic degenerative changes present at the acromioclavicular joint, more pronounced as compared to previous imaging.  There is no dislocation or evidence of fracture.     Impression:     As above        Electronically signed by:Moira Barclay MD  Date:                                            11/20/2024    IMPRESSION       ICD-10-CM ICD-9-CM   1. Rotator cuff dysfunction, left  M67.912 726.10   2. Acute pain of left shoulder  M25.512 719.41       MEDICATIONS PRESCRIBED      None    RECOMMENDATIONS     Patient has been explained that clinically this suggest a rotator cuff tear.  We have requested for a MRI Left Shoulder and will review him after the results.   He has failed extensive nonsurgical treatment and could not tolerate any more physical therapy.      All questions were answered, pt will contact us for questions or concerns in the interim.

## 2025-01-06 ENCOUNTER — OFFICE VISIT (OUTPATIENT)
Dept: SPORTS MEDICINE | Facility: CLINIC | Age: 70
End: 2025-01-06
Payer: MEDICARE

## 2025-01-06 VITALS
SYSTOLIC BLOOD PRESSURE: 144 MMHG | WEIGHT: 225 LBS | BODY MASS INDEX: 30.48 KG/M2 | HEIGHT: 72 IN | RESPIRATION RATE: 17 BRPM | DIASTOLIC BLOOD PRESSURE: 91 MMHG | HEART RATE: 82 BPM

## 2025-01-06 DIAGNOSIS — M67.912 ROTATOR CUFF DYSFUNCTION, LEFT: Primary | ICD-10-CM

## 2025-01-06 DIAGNOSIS — M25.512 ACUTE PAIN OF LEFT SHOULDER: ICD-10-CM

## 2025-01-06 PROCEDURE — 3077F SYST BP >= 140 MM HG: CPT | Mod: CPTII,S$GLB,, | Performed by: ORTHOPAEDIC SURGERY

## 2025-01-06 PROCEDURE — 99204 OFFICE O/P NEW MOD 45 MIN: CPT | Mod: S$GLB,,, | Performed by: ORTHOPAEDIC SURGERY

## 2025-01-06 PROCEDURE — 1101F PT FALLS ASSESS-DOCD LE1/YR: CPT | Mod: CPTII,S$GLB,, | Performed by: ORTHOPAEDIC SURGERY

## 2025-01-06 PROCEDURE — 3008F BODY MASS INDEX DOCD: CPT | Mod: CPTII,S$GLB,, | Performed by: ORTHOPAEDIC SURGERY

## 2025-01-06 PROCEDURE — 3080F DIAST BP >= 90 MM HG: CPT | Mod: CPTII,S$GLB,, | Performed by: ORTHOPAEDIC SURGERY

## 2025-01-06 PROCEDURE — 1125F AMNT PAIN NOTED PAIN PRSNT: CPT | Mod: CPTII,S$GLB,, | Performed by: ORTHOPAEDIC SURGERY

## 2025-01-06 PROCEDURE — 1159F MED LIST DOCD IN RCRD: CPT | Mod: CPTII,S$GLB,, | Performed by: ORTHOPAEDIC SURGERY

## 2025-01-06 PROCEDURE — 3288F FALL RISK ASSESSMENT DOCD: CPT | Mod: CPTII,S$GLB,, | Performed by: ORTHOPAEDIC SURGERY

## 2025-01-06 PROCEDURE — 99999 PR PBB SHADOW E&M-EST. PATIENT-LVL III: CPT | Mod: PBBFAC,HCNC,, | Performed by: ORTHOPAEDIC SURGERY

## 2025-01-09 ENCOUNTER — PATIENT MESSAGE (OUTPATIENT)
Dept: INTERNAL MEDICINE | Facility: CLINIC | Age: 70
End: 2025-01-09
Payer: MEDICARE

## 2025-01-14 ENCOUNTER — HOSPITAL ENCOUNTER (OUTPATIENT)
Dept: RADIOLOGY | Facility: HOSPITAL | Age: 70
Discharge: HOME | End: 2025-01-14
Payer: MEDICARE

## 2025-01-14 DIAGNOSIS — M25.512 ACUTE PAIN OF LEFT SHOULDER: ICD-10-CM

## 2025-01-14 DIAGNOSIS — M67.912 ROTATOR CUFF DYSFUNCTION, LEFT: ICD-10-CM

## 2025-01-14 PROCEDURE — 73221 MRI JOINT UPR EXTREM W/O DYE: CPT | Mod: TC,LT

## 2025-01-14 PROCEDURE — 73221 MRI JOINT UPR EXTREM W/O DYE: CPT | Mod: 26,LT,, | Performed by: RADIOLOGY

## 2025-01-17 ENCOUNTER — TELEPHONE (OUTPATIENT)
Dept: HEMATOLOGY/ONCOLOGY | Facility: CLINIC | Age: 70
End: 2025-01-17
Payer: MEDICARE

## 2025-01-17 ENCOUNTER — PATIENT MESSAGE (OUTPATIENT)
Dept: INTERNAL MEDICINE | Facility: CLINIC | Age: 70
End: 2025-01-17
Payer: MEDICARE

## 2025-01-17 ENCOUNTER — OFFICE VISIT (OUTPATIENT)
Dept: SPORTS MEDICINE | Facility: CLINIC | Age: 70
End: 2025-01-17
Payer: MEDICARE

## 2025-01-17 ENCOUNTER — TELEPHONE (OUTPATIENT)
Dept: INTERNAL MEDICINE | Facility: CLINIC | Age: 70
End: 2025-01-17
Payer: MEDICARE

## 2025-01-17 VITALS
BODY MASS INDEX: 31.31 KG/M2 | HEART RATE: 80 BPM | WEIGHT: 231.13 LBS | DIASTOLIC BLOOD PRESSURE: 94 MMHG | HEIGHT: 72 IN | SYSTOLIC BLOOD PRESSURE: 151 MMHG

## 2025-01-17 DIAGNOSIS — M25.512 ACUTE PAIN OF LEFT SHOULDER: ICD-10-CM

## 2025-01-17 DIAGNOSIS — S46.012A TRAUMATIC COMPLETE TEAR OF LEFT ROTATOR CUFF, INITIAL ENCOUNTER: Primary | ICD-10-CM

## 2025-01-17 DIAGNOSIS — M67.912 ROTATOR CUFF DYSFUNCTION, LEFT: ICD-10-CM

## 2025-01-17 DIAGNOSIS — M67.912 ROTATOR CUFF DYSFUNCTION, LEFT: Primary | ICD-10-CM

## 2025-01-17 PROCEDURE — 99999 PR PBB SHADOW E&M-EST. PATIENT-LVL II: CPT | Mod: PBBFAC,,, | Performed by: ORTHOPAEDIC SURGERY

## 2025-01-17 PROCEDURE — 3288F FALL RISK ASSESSMENT DOCD: CPT | Mod: CPTII,S$GLB,, | Performed by: ORTHOPAEDIC SURGERY

## 2025-01-17 PROCEDURE — 3080F DIAST BP >= 90 MM HG: CPT | Mod: CPTII,S$GLB,, | Performed by: ORTHOPAEDIC SURGERY

## 2025-01-17 PROCEDURE — 99215 OFFICE O/P EST HI 40 MIN: CPT | Mod: S$GLB,,, | Performed by: ORTHOPAEDIC SURGERY

## 2025-01-17 PROCEDURE — 3008F BODY MASS INDEX DOCD: CPT | Mod: CPTII,S$GLB,, | Performed by: ORTHOPAEDIC SURGERY

## 2025-01-17 PROCEDURE — 1101F PT FALLS ASSESS-DOCD LE1/YR: CPT | Mod: CPTII,S$GLB,, | Performed by: ORTHOPAEDIC SURGERY

## 2025-01-17 PROCEDURE — 1125F AMNT PAIN NOTED PAIN PRSNT: CPT | Mod: CPTII,S$GLB,, | Performed by: ORTHOPAEDIC SURGERY

## 2025-01-17 PROCEDURE — 1159F MED LIST DOCD IN RCRD: CPT | Mod: CPTII,S$GLB,, | Performed by: ORTHOPAEDIC SURGERY

## 2025-01-17 PROCEDURE — 3077F SYST BP >= 140 MM HG: CPT | Mod: CPTII,S$GLB,, | Performed by: ORTHOPAEDIC SURGERY

## 2025-01-17 NOTE — PROGRESS NOTES
ESTABLISHED PATIENT    History 1/17/2025  Ron returns to clinic today to discuss results of  his MRI for his left shoulder pain     History  1/6/2025  69 y.o. Male with a history of left shoulder pain who is a  and is right hand dominant. He states he hurt his shoulder in early November 2024 while doing a push up. He was seen by his PCP and was prescribed OT.  Pain is located all around the shoulder. Pain wakes him up at night. Patient did attend PT for a 3 weeks as the pain was getting worse.     + mechanical symptoms, - instability    Is affecting ADLs.  Pain is 4/10 at it's worst.        PAST MEDICAL HISTORY    Past Medical History:   Diagnosis Date    Allergy     COVID-19 07/2021    presumed Delta strain    Deep vein thrombosis     Elevated blood pressure reading without diagnosis of hypertension 09/28/2016    Good home BP log      Other hyperlipidemia 02/13/2014    ASCVD risk score indicates need for statin.  I have counseled the patient on this risk factor as well also recommendation for statin use for reduction of heart attack and stroke risk.  He has verbally acknowledged this information and wishes NOT to pursue statins.  He is aware that this decision leaves him untreated for significant cardiovascular risk      Recurrent deep vein thrombosis (DVT) 10/06/2023    Positive family history.  Known to Hematology.  Recommended long-term DOAC      Umbilical hernia without obstruction and without gangrene 11/22/2019    Small.  Asymptomatic         PAST SURGICAL HISTORY     Past Surgical History:   Procedure Laterality Date    HERNIA REPAIR         FAMILY HISTORY    Family History   Problem Relation Name Age of Onset    Alcohol abuse Mother      Asthma Father         SOCIAL HISTORY    Social History     Socioeconomic History    Marital status:    Tobacco Use    Smoking status: Never     Passive exposure: Never    Smokeless tobacco: Never   Substance and Sexual Activity    Alcohol use: Yes      Alcohol/week: 2.0 standard drinks of alcohol     Types: 1 Glasses of wine, 1 Shots of liquor per week     Comment: once a month    Drug use: No    Sexual activity: Not Currently     Social Drivers of Health     Financial Resource Strain: Low Risk  (4/22/2024)    Overall Financial Resource Strain (CARDIA)     Difficulty of Paying Living Expenses: Not hard at all   Food Insecurity: No Food Insecurity (4/22/2024)    Hunger Vital Sign     Worried About Running Out of Food in the Last Year: Never true     Ran Out of Food in the Last Year: Never true   Transportation Needs: No Transportation Needs (4/22/2024)    PRAPARE - Transportation     Lack of Transportation (Medical): No     Lack of Transportation (Non-Medical): No   Physical Activity: Insufficiently Active (4/22/2024)    Exercise Vital Sign     Days of Exercise per Week: 2 days     Minutes of Exercise per Session: 40 min   Stress: Stress Concern Present (4/22/2024)    [employer] - Occupational Stress Questionnaire     Feeling of Stress : To some extent   Housing Stability: Unknown (4/22/2024)    Housing Stability Vital Sign     Unable to Pay for Housing in the Last Year: No       MEDICATIONS      Current Outpatient Medications:     ELIQUIS 2.5 mg Tab, TAKE 1 TABLET(2.5 MG) BY MOUTH TWICE DAILY, Disp: 60 tablet, Rfl: 5    ALLERGIES     Review of patient's allergies indicates:   Allergen Reactions    Penicillins Other (See Comments)         REVIEW OF SYSTEMS   Constitution: Negative. Negative for chills, fever and night sweats.   HENT: Negative for congestion and headaches.    Eyes: Negative for blurred vision, left vision loss and right vision loss.   Cardiovascular: Negative for chest pain and syncope.   Respiratory: Negative for cough and shortness of breath.    Endocrine: Negative for polydipsia, polyphagia and polyuria.   Hematologic/Lymphatic: Negative for bleeding problem. Does not bruise/bleed easily.   Skin: Negative for dry  skin, itching and rash.   Musculoskeletal: Negative for falls. Positive for left shoulder pain   Gastrointestinal: Negative for abdominal pain and bowel incontinence.   Genitourinary: Negative for bladder incontinence and nocturia.   Neurological: Negative for disturbances in coordination, loss of balance and seizures.   Psychiatric/Behavioral: Negative for depression. The patient does not have insomnia.    Allergic/Immunologic: Negative for hives and persistent infections.     PHYSICAL EXAMINATION    Vitals: There were no vitals taken for this visit.    General: The patient appears active and healthy with no apparent physical problems.  No disturbance of mood or affect is demonstrated. Alert and Oriented.    HEENT: Eyes normal, pupils equally round, nose normal.    Resp: Equal and symmetrical chest rises. No wheezing    CV: Regular rate    Neck: Supple; nonpainful range of motion.    Extremities: no cyanosis, clubbing, edema, or diffuse swelling.  Palpable pulses, good capillary refill of the digits.  No coolness, discoloration, edema or obvious varicosities.    Skin: no lesions noted.    Lymphatic: no detected adenopathy in the upper or lower extremities.    Neurologic: normal mental status, normal reflexes, normal gait and balance.  Patient is alert and oriented to person, place and time.  No flaccidity or spasticity is noted.  No motor or sensory deficits are noted.  Light touch is intact    Orthopaedic: SHOULDER EXAM - left    Inspection:   Normal skin color and appearance with no scars.  No muscle atrophy noted.  No scapular winging.      Palpation: No tenderness of the acromioclavicular joint, lateral edge of the acromion, biceps tendon, trapezius muscle or scapulothoracic bursa.      ROM:      PROM:     FE - 170°    Abd/ER -  90°  Abd/IR -  45°   Add/ER -  60°     AROM:    FE - 170°    Abd/ER -  90°  Abd/IR -  45°   Add/ER -  60°     Pain between 100 and 130° of forward elevation    Tests:     - Costello, -  "Neer's, - Cross Arm Adduction, + Galax, - Yerguson, - Speed. + Belly Press,  + Jobes, + Lift Off    Stability: - sulcus, - apprehension, - relocation, - load and shift, - DLS      Motor:  Rotator cuff strength is 4/5 supraspinatus, 4/5 infraspinatus, 4/5 subscapularis. Biceps, triceps and deltoid strength is 5/5.      Neuro     Distally there are no paresthesias, and sensation is intact to light touch in the median, ulnar, and radial distributions.  Reflexes are 2/2 biceps, triceps and brachioradialis.        IMAGING  MRI Shoulder Without Contrast Left  Narrative: EXAMINATION:  MRI SHOULDER WITHOUT CONTRAST LEFT    CLINICAL HISTORY:  Shoulder pain, rotator cuff disorder suspected, xray done;  Pain in left shoulder    TECHNIQUE:  Multiplanar multisequence MRI examination of LEFT shoulder.    COMPARISON:  11/20/2024    FINDINGS:  ROTATOR CUFF:    Supraspinatus: Full-thickness full width tear of the supraspinatus measuring 18 by 20 mm, distal myotendinous level.    Infraspinatus: Intact.  Moderate tendinosis.    Subscapularis:  Partial tear of the proximal "leading edge" cranial and undersurface locationwith split/delaminationat footprint.    Teres Minor: Intact.  No tendinosis.    There is moderate fluid within the subacromial/subdeltoid bursa.  Joint effusion.    LABRUM: Circumferential fraying on this standard non arthrogram exam.  Anterior inferior labrum appears intact. IGHL:Intact.    LONG HEAD BICEPS TENDON: Located within bicipital groove and intact with delaminating type subscapularis tear..Biceps-labral anchor is intact. Mild intra-articular tendinosis.  Rotator Interval is abnormal with synovial thickening/increased signal.  Given medial subluxation of biceps on axial images,"displacement sign" of biceps on sagittal images, "detour" sign on axial images and irregularity of superior border of subscapularis tendon, biceps pulley lesion must be suspect..    BONES: No evident fracture.Visualized marrow within " "normal limits. AC joint demonstrates normal alignment with moderate hypertrophy.No significant osteo-acromial outlet narrowing.  There is no evident os acromiale.    CARTILAGE: Humeral head and glenoid cartilage preserved without focal defects. No subchondral marrow edema.  No synovial abnormality or intra-articular loose bodies. Glenoid fossa demonstrates no sclerosis.    MUSCLES:  Normal bulk and signal of supraspinatus and infraspinatus. Fatty atrophy of the teres minor muscle likely relating to denervation. While this may be related to "quadrilateral space syndrome"  of questionable etiology, no compressing lesion is seen along the course of the axillary nerve.  Impression: Full-thickness full width tear of distal myotendinous junction of supraspinatus with associated tendinosis with a 18 x 20 mm gap.  Associated subacromial subdeltoid bursitis.    Delaminating type subscapularis tear with suspicion for concomitant biceps pulley lesion.  As above    Electronically signed by: Aidan Vegas MD  Date:    01/14/2025  Time:    08:49      EXAMINATION:  XR SHOULDER COMPLETE 2 OR MORE VIEWS LEFT     CLINICAL HISTORY:  anterior pain after popping sensation during exercise;Pain in left shoulder     TECHNIQUE:  Two or three views of the left shoulder were performed.     COMPARISON:  January 2018     FINDINGS:  There is osseous demineralization.  Moderate osteoarthritic degenerative changes present at the acromioclavicular joint, more pronounced as compared to previous imaging.  There is no dislocation or evidence of fracture.     Impression:     As above        Electronically signed by:Moira Barclay MD  Date:                                            11/20/2024    IMPRESSION       ICD-10-CM ICD-9-CM   1. Rotator cuff dysfunction, left  M67.912 726.10   2. Acute pain of left shoulder  M25.512 719.41         MEDICATIONS PRESCRIBED      None    RECOMMENDATIONS     Surgical versus non-surgical options discussed today; " left shoulder arthroscopy with rotator cuff repair, extensive debridement, sub-acromial decompression, possible Cuffmend Patch vs Regeneten Patch and possible open sub-pectoral biceps tenodesis versus arthroscopic biceps tenodesis  The patient elected to proceed with operative intervention after all risks, benefits, and alternatives were discussed with the patient.  The risks of surgery include bleeding, scar formation, injuries to nerves, arteries and veins, need for additional surgeries, blood clots, infections, inability to return to the same level of the performance and the risk of anesthesia.   Pre-op with Gregg Maharaj PA-C  Will make him the last case of the day.   He will have to discontinue the Eliquis prior to surgery, and will need Hematology clearance and PCP clearance   He understands the complexity of this type of rotator cuff pathology.  He understands these can have a high rate of failure based on the location of the tear.  We did discuss other options if needed should this not heal.  He understood this and wanted to proceed with surgery.      All questions were answered, pt will contact us for questions or concerns in the interim.

## 2025-01-17 NOTE — TELEPHONE ENCOUNTER
----- Message from Selvin Rae MD sent at 1/17/2025  3:46 PM CST -----  Regarding: FW: Surgery Clearance  Please sched him with Tonya or me for surgical clearance. If neither of us are available next week it's ok for him to go to the pre-op clinic.    Sincerely,  Gaurav  ----- Message -----  From: Osman Santiago LPN  Sent: 1/17/2025   3:28 PM CST  To: Selvin Rae MD  Subject: FW: Surgery Clearance                              ----- Message -----  From: Tobias Heck MA  Sent: 1/17/2025   2:47 PM CST  To: Butch Montalvo Staff; Maddy Sheldon Staff  Subject: Surgery Clearance                                Good Afternoon,    Mr. Christina is scheduled for a left shoulder arthroscopy with Dr. Walters on 1/28/2025.    Medical clearance is indicated prior to this. The anesthesia team will require explicit documentation regarding clearance status and perioperative medical recommendations. The medical clearance is good for 30 days and will need to be within this window of the scheduled surgery date.     Dr. Walters is also requesting him to be cleared by Hematology due to his history of blood clots and use of Eliquis.    Thank you for your help & let me know if I can assist in any way!      Tobias Heck MA  Medical Assistant - Dr. Tone Walters  [facility]

## 2025-01-17 NOTE — TELEPHONE ENCOUNTER
Spoke with patient who stated that he needed clearance prior to his appointment for rotator cuff repair surgery. Pt was advised that I would reach back out soon. Pt verbalized agreement and understanding.     Portal message sent with clearance letter attached.

## 2025-01-17 NOTE — TELEPHONE ENCOUNTER
----- Message from Nemo sent at 1/17/2025  4:30 PM CST -----  Regarding: FW: Scheduling Request  Contact: Luisana wife    ----- Message -----  From: Sven Duckworth  Sent: 1/17/2025   4:27 PM CST  To: [facility]  Pool  Subject: FW: Scheduling Request                             ----- Message -----  From: Amie Freeman  Sent: 1/17/2025   4:26 PM CST  To: [facility]  Pool  Subject: Scheduling Request                                 Scheduling Request           Appt Type:  EP     Date/Time Preference:any     Treating Provider:Kaden     Caller Name:Luisana wife     Contact Preference:[phone]     Comments/notes:Patients wife is calling to schedule patient for a F/U due to needing angelina for surgery on the 01/28. Requesting a call back ASAP

## 2025-01-31 NOTE — PROGRESS NOTES
CC: Left shoulder pain    69 y.o. Male presents as a new patient to me. Patient is right hand dominant.  Accompanied by his wife today.  He works as a , which entails mostly computer work and driving. Complaint is left shoulder pain x 3 months (November 2024). Injured the shoulder while doing a pushup.  Pain localizes to the lateral shoulder with radiation down the arm. Worse with movement and lifting, including overhead movement and behind the back movement. Pain is disruptive to sleep at night. Better with rest. Denies neck pain or radicular symptoms. Treatment thus far has included activity modifications, rest, and oral medication, which have not significantly helped his symptoms. He has also tried PT which actually aggravated his symptoms.  Here today to discuss diagnosis and treatment options.     Patient has been seen previously by Tone Walters MD. Surgery was previously discussed at that time consisting of left shoulder arthroscopy, RCT repair, SAD, biceps tenodesis, and patient was previously scheduled for surgery with Dr. Walters 1/28/25, which had to be cancelled. Dr. Walters is no longer in our practice.    PMHx notable for DVT, HTN.  Takes Eliquis 2.5 mg twice a day.  Negative for tobacco.   Negative for diabetes. Last A1C: 5.5 03/15/24    VAS 2/10 at rest --> 7/10 with movement    PAST MEDICAL HISTORY:   Past Medical History:   Diagnosis Date    Allergy     COVID-19 07/2021    presumed Delta strain    Deep vein thrombosis     Elevated blood pressure reading without diagnosis of hypertension 09/28/2016    Good home BP log      Other hyperlipidemia 02/13/2014    ASCVD risk score indicates need for statin.  I have counseled the patient on this risk factor as well also recommendation for statin use for reduction of heart attack and stroke risk.  He has verbally acknowledged this information and wishes NOT to pursue statins.  He is aware that this decision leaves him untreated for significant  cardiovascular risk      Recurrent deep vein thrombosis (DVT) 10/06/2023    Positive family history.  Known to Hematology.  Recommended long-term DOAC      Umbilical hernia without obstruction and without gangrene 11/22/2019    Small.  Asymptomatic       PAST SURGICAL HISTORY:  Past Surgical History:   Procedure Laterality Date    HERNIA REPAIR       FAMILY HISTORY:  Family History   Problem Relation Name Age of Onset    Alcohol abuse Mother      Asthma Father       MEDICATIONS:    Current Outpatient Medications:     ELIQUIS 2.5 mg Tab, TAKE 1 TABLET(2.5 MG) BY MOUTH TWICE DAILY, Disp: 60 tablet, Rfl: 5    ALLERGIES:  Review of patient's allergies indicates:   Allergen Reactions    Penicillins Other (See Comments)     REVIEW OF SYSTEMS:  Constitution: Negative. Negative for chills, fever and night sweats.    Hematologic/Lymphatic: Negative for bleeding problem. Does not bruise/bleed easily.   Skin: Negative for dry skin, itching and rash.   Musculoskeletal: Negative for falls. Positive for left shoulder pain and muscle weakness.     All other review of symptoms were reviewed and found to be noncontributory.    PHYSICAL EXAMINATION:  Vitals:  /89   Pulse 86   Ht 6' (1.829 m)   Wt 105 kg (231 lb 7.7 oz)   BMI 31.39 kg/m²    General: Well-developed well-nourished 69 y.o. malein no acute distress   Cardiovascular: Regular rhythm by palpation of distal pulse, normal color and temperature, no concerning varicosities on symptomatic side   Lungs: No labored breathing or wheezing appreciated   Neuro: Alert and oriented ×3   Psychiatric: well oriented to person, place and time, demonstrates normal mood and affect   Skin: No rashes, lesions or ulcers, normal temperature, turgor, and texture on uninvolved extremity    Ortho/SPM Exam  Examination of the left shoulder demonstrates active forward elevation to 130, ER with arm at side to 50, IR to L3. Passive FE to 150, ER to 60. +Tenderness along the proximal biceps  tendon and lateral subdeltoid recess. Negative AC tenderness.  There is some AC prominence but this is not a typical area of pain for him.  Positive modified speed's test.  Pain with Neer and Costello impingement maneuvers.  4-/5 resisted supraspinatus testing.  Limitation due to pain.  4+/5 resisted infraspinatus testing. Negative belly press test.  There is some crepitus with circumduction of the shoulder.  Positive compression rotation test  Stable shoulder. No midline neck tenderness. Negative Spurling's maneuver.  Motor and sensory intact to the left hand.    IMAGING:  Xrays 11/20/24 including AP, Outlet and Axillary Lateral of Left shoulder are reviewed by me:  There is osseous demineralization. Moderate osteoarthritic degenerative changes present at the acromioclavicular joint, more pronounced as compared to previous imaging. There is no dislocation or evidence of fracture.     Repeat x-rays today of the left shoulder to include AP Grashey and axillary lateral views were ordered and reviewed by me showing a well-maintained glenohumeral joint space.  No significant degenerative changes    MRI 01/14/25 of left shoulder reviewed by me and discussed with patient. Study shows:   Full-thickness full width tear of distal myotendinous junction of supraspinatus with associated tendinosis with a 18 x 20 mm gap.  Associated subacromial subdeltoid bursitis.  Delaminating type subscapularis tear with suspicion for concomitant biceps pulley lesion.    On my read:  Sagittal T1 weighted sequence shows negative tangent sign.  No significant fatty infiltration.    ASSESSMENT:      ICD-10-CM ICD-9-CM   1. Traumatic complete tear of left rotator cuff, initial encounter  S46.012A 840.4   2. Biceps tendinopathy, left  M67.922 727.9       PLAN:     Findings discussed with the patient and his wife at length.  Exam and imaging show a symptomatic full-thickness rotator cuff tear.  Unique tear pattern with myotendinous level injury.   Underlying significant tendinosis.  Well-maintained muscle bulk and quality on imaging.  No significant degenerative changes on advanced imaging as well.  Discussed both operative and nonoperative treatment options.  Conservative care to this point has not provided significant pain relief.  The patient has pain and weakness on exam.  We discussed considerations for arthroscopic intervention to include rotator cuff repair with possible Regeneten patch augmentation, biceps tenodesis versus tenotomy, debridement.  The expected postop rehab and recovery course was reviewed.  Outlined risks include but are not limited to continued or recurrent pain, tissue nonhealing re-tear, stiffness, weakness, potential need for further surgery or intervention in the future.  We did discuss the significance of his particular tear pattern and what appears to be fairly poor appearing tissue quality.  This may not be a repairable tear.  There is higher risk in this case for tissue nonhealing re-tear as well.  Ultimately this could require additional surgery to include a reverse shoulder arthroplasty.  We did discuss the alternative option of a reverse shoulder arthroplasty at this time but I do think that would be a bit too aggressive given the current status of the shoulder.  They do wish to proceed as soon as possible.    Plan is Left shoulder rotator cuff repair with possible xenograft tissue augmentation, biceps tenodesis, possible subacromial decompression    Hold Eliquis 48 hours pre-surgery and plan to restart 24 hours postop    Informed Consent:    The details of the surgical procedure were explained, including the location of probable incisions and a description of possible hardware and/or grafts to be used. Alternatives to both operative and non-operative options with associated risks and benefits were discussed. The patient understands the likely convalescence after surgery and, in particular, the expected postop rehab and  recovery course. The outlined risks and potential complications of the proposed procedure include but are not limited to: infection, poor wound healing, scarring, deformity, stiffness, swelling, continued or recurrent pain, instability, hardware or prosthetic failure if implanted, symptomatic hardware requiring removal, dislocation, weakness, neurovascular injury, numbness, chronic regional pain disorder, tissue nonhealing/irreparability/retear, subsequent contralateral limb injury or pathology, chondral injury, arthritis, fracture, blood clot formation, inability to return to previous level of activity, anesthetic or regional block complication up to death, need for additional procedure as indicated intraoperatively, and potential need for further surgery.    The patient was also informed and understands that the risks of surgery are greater for patients with a current condition or history of heart disease, obesity, clotting disorders, recurrent infections, steroid use, current or past smoking, and factors such as sedentary lifestyle and noncompliance with medications, therapy or follow-up. The degree of the increased risk is hard to estimate with any degree of precision. If applicable, smoking cessation was discussed.     All questions were answered. The patient has verbalized understanding of these issues and wishes to proceed with the surgery as discussed.    Procedures

## 2025-02-04 ENCOUNTER — PATIENT MESSAGE (OUTPATIENT)
Dept: INTERNAL MEDICINE | Facility: CLINIC | Age: 70
End: 2025-02-04
Payer: MEDICARE

## 2025-02-04 ENCOUNTER — OFFICE VISIT (OUTPATIENT)
Dept: SPORTS MEDICINE | Facility: CLINIC | Age: 70
End: 2025-02-04
Payer: MEDICARE

## 2025-02-04 ENCOUNTER — HOSPITAL ENCOUNTER (OUTPATIENT)
Dept: RADIOLOGY | Facility: HOSPITAL | Age: 70
Discharge: HOME | End: 2025-02-04
Attending: ORTHOPAEDIC SURGERY
Payer: MEDICARE

## 2025-02-04 VITALS
HEIGHT: 72 IN | SYSTOLIC BLOOD PRESSURE: 130 MMHG | HEART RATE: 86 BPM | WEIGHT: 231.5 LBS | BODY MASS INDEX: 31.36 KG/M2 | DIASTOLIC BLOOD PRESSURE: 89 MMHG

## 2025-02-04 DIAGNOSIS — S46.012A TRAUMATIC COMPLETE TEAR OF LEFT ROTATOR CUFF, INITIAL ENCOUNTER: Primary | ICD-10-CM

## 2025-02-04 DIAGNOSIS — M67.922 BICEPS TENDINOPATHY, LEFT: ICD-10-CM

## 2025-02-04 DIAGNOSIS — S46.012A TRAUMATIC COMPLETE TEAR OF LEFT ROTATOR CUFF, INITIAL ENCOUNTER: ICD-10-CM

## 2025-02-04 DIAGNOSIS — I82.451 ACUTE DEEP VEIN THROMBOSIS (DVT) OF RIGHT PERONEAL VEIN: ICD-10-CM

## 2025-02-04 PROCEDURE — 1101F PT FALLS ASSESS-DOCD LE1/YR: CPT | Mod: HCNC,CPTII,S$GLB, | Performed by: ORTHOPAEDIC SURGERY

## 2025-02-04 PROCEDURE — 3008F BODY MASS INDEX DOCD: CPT | Mod: HCNC,CPTII,S$GLB, | Performed by: ORTHOPAEDIC SURGERY

## 2025-02-04 PROCEDURE — 3079F DIAST BP 80-89 MM HG: CPT | Mod: HCNC,CPTII,S$GLB, | Performed by: ORTHOPAEDIC SURGERY

## 2025-02-04 PROCEDURE — 73030 X-RAY EXAM OF SHOULDER: CPT | Mod: 26,HCNC,LT, | Performed by: RADIOLOGY

## 2025-02-04 PROCEDURE — 1159F MED LIST DOCD IN RCRD: CPT | Mod: HCNC,CPTII,S$GLB, | Performed by: ORTHOPAEDIC SURGERY

## 2025-02-04 PROCEDURE — 3075F SYST BP GE 130 - 139MM HG: CPT | Mod: HCNC,CPTII,S$GLB, | Performed by: ORTHOPAEDIC SURGERY

## 2025-02-04 PROCEDURE — 99999 PR PBB SHADOW E&M-EST. PATIENT-LVL III: CPT | Mod: PBBFAC,HCNC,, | Performed by: ORTHOPAEDIC SURGERY

## 2025-02-04 PROCEDURE — 73030 X-RAY EXAM OF SHOULDER: CPT | Mod: TC,HCNC,LT

## 2025-02-04 PROCEDURE — 1125F AMNT PAIN NOTED PAIN PRSNT: CPT | Mod: HCNC,CPTII,S$GLB, | Performed by: ORTHOPAEDIC SURGERY

## 2025-02-04 PROCEDURE — 3288F FALL RISK ASSESSMENT DOCD: CPT | Mod: HCNC,CPTII,S$GLB, | Performed by: ORTHOPAEDIC SURGERY

## 2025-02-04 PROCEDURE — 99215 OFFICE O/P EST HI 40 MIN: CPT | Mod: HCNC,S$GLB,, | Performed by: ORTHOPAEDIC SURGERY

## 2025-02-05 ENCOUNTER — TELEPHONE (OUTPATIENT)
Dept: INTERNAL MEDICINE | Facility: CLINIC | Age: 70
End: 2025-02-05
Payer: MEDICARE

## 2025-02-05 NOTE — TELEPHONE ENCOUNTER
----- Message from Selvin Rae MD sent at 2/5/2025 10:32 AM CST -----  Regarding: RE: Pre-Op Medical Clearance Needed  When is Tonya's next available for in person?  ----- Message -----  From: Deepak Frost MA  Sent: 2/5/2025  10:19 AM CST  To: Selvin Rae MD  Subject: FW: Pre-Op Medical Clearance Needed                ----- Message -----  From: Monique Mclean  Sent: 2/4/2025   5:20 PM CST  To: Selvin Rae MD; Butch Montalvo Staff  Subject: Pre-Op Medical Clearance Needed                  Dr. Rae & Staff-     Patient has been scheduled to undergo left shoulder arthroscopy with rotator cuff repair with Dr. Brown on 02/21/25.    Patient will undergo a interscalene regional block with general anesthesia. Patient will be positioned laterally.     To assist our [place] Pre-Operative Anesthesia Team, please confirm that patient is optimized for surgery by ordering any tests or blood work you deem necessary.     Thank you-    Monique Mclean MS, OTC  Clinical/Surgical Assistant to CELSO Brown MD  [facility]  P: [phone]  F: [phone]

## 2025-02-05 NOTE — TELEPHONE ENCOUNTER
----- Message from Selvin Rae MD sent at 2/5/2025 10:32 AM CST -----  Regarding: RE: Pre-Op Medical Clearance Needed  When is [facility] next available for in person?  ----- Message -----  From: Deepak Frost MA  Sent: 2/5/2025  10:19 AM CST  To: Selvin Rae MD  Subject: FW: Pre-Op Medical Clearance Needed                ----- Message -----  From: Monique Mclean  Sent: 2/4/2025   5:20 PM CST  To: Selvin Rae MD; [facility]al[facility] Staff  Subject: Pre-Op Medical Clearance Needed                  Dr. Rae & Staff-     Patient has been scheduled to undergo left shoulder arthroscopy with rotator cuff repair with Dr. Brown on 02/21/25.    Patient will undergo a interscalene regional block with general anesthesia. Patient will be positioned laterally.     To assist our [facility]est[facility] Team, please confirm that patient is optimized for surgery by ordering any tests or blood work you deem necessary.     Thank you-    Monique Mlcean MS, OTC  Clinical/Surgical Assistant to CELSO Brown MD  [facility]  P: [phone]  F: [phone]

## 2025-02-06 ENCOUNTER — OFFICE VISIT (OUTPATIENT)
Dept: HEMATOLOGY/ONCOLOGY | Facility: CLINIC | Age: 70
End: 2025-02-06
Payer: MEDICARE

## 2025-02-06 VITALS
WEIGHT: 231.94 LBS | SYSTOLIC BLOOD PRESSURE: 136 MMHG | BODY MASS INDEX: 30.74 KG/M2 | OXYGEN SATURATION: 96 % | HEIGHT: 73 IN | DIASTOLIC BLOOD PRESSURE: 98 MMHG | HEART RATE: 114 BPM | TEMPERATURE: 98 F

## 2025-02-06 DIAGNOSIS — Z79.01 CHRONIC ANTICOAGULATION: ICD-10-CM

## 2025-02-06 DIAGNOSIS — I82.409 RECURRENT DEEP VEIN THROMBOSIS (DVT): Primary | Chronic | ICD-10-CM

## 2025-02-06 PROCEDURE — 1159F MED LIST DOCD IN RCRD: CPT | Mod: HCNC,CPTII,S$GLB, | Performed by: INTERNAL MEDICINE

## 2025-02-06 PROCEDURE — 1125F AMNT PAIN NOTED PAIN PRSNT: CPT | Mod: HCNC,CPTII,S$GLB, | Performed by: INTERNAL MEDICINE

## 2025-02-06 PROCEDURE — 99999 PR PBB SHADOW E&M-EST. PATIENT-LVL III: CPT | Mod: PBBFAC,HCNC,, | Performed by: INTERNAL MEDICINE

## 2025-02-06 PROCEDURE — 3075F SYST BP GE 130 - 139MM HG: CPT | Mod: HCNC,CPTII,S$GLB, | Performed by: INTERNAL MEDICINE

## 2025-02-06 PROCEDURE — 1101F PT FALLS ASSESS-DOCD LE1/YR: CPT | Mod: HCNC,CPTII,S$GLB, | Performed by: INTERNAL MEDICINE

## 2025-02-06 PROCEDURE — 99214 OFFICE O/P EST MOD 30 MIN: CPT | Mod: HCNC,S$GLB,, | Performed by: INTERNAL MEDICINE

## 2025-02-06 PROCEDURE — 3080F DIAST BP >= 90 MM HG: CPT | Mod: HCNC,CPTII,S$GLB, | Performed by: INTERNAL MEDICINE

## 2025-02-06 PROCEDURE — 3288F FALL RISK ASSESSMENT DOCD: CPT | Mod: HCNC,CPTII,S$GLB, | Performed by: INTERNAL MEDICINE

## 2025-02-06 PROCEDURE — 3008F BODY MASS INDEX DOCD: CPT | Mod: HCNC,CPTII,S$GLB, | Performed by: INTERNAL MEDICINE

## 2025-02-06 NOTE — PROGRESS NOTES
PATIENT: Ron Christina  MRN: [id number]  DATE: 2/6/2025    Visit Diagnosis:DVT       Subjective:      HPI:   Mr. Christina is a 69 y.o. male who presents for follow up of  DVT. His first DVT was in the right posterior tibial vein in 2017 and was attributed to a possible leg injury and travel (plain and cruise). He completed three months of apixaban and had no other clots.   He was visiting his son in [place] (8-hour drive) in September 2023 and was working on a hot tub when he stepped into a trench and hurt his right leg. He then drove back to [place] and went to the ED four days after the injury , in Oct 2023, and was diagnosed with a right posterior tibial DVT. He had an U/S after treatment of his first DVT and there was no evidence of a thrombus after treatment. He reports multiple family members have had blood clots, most seem to have had a provoking incident.       Interval History: He is here for follow up visit.   He is taking eliquis 2.5mg BID without any issues.   Transferring care to  since Dr. Alfonso is out  He has lt shoulder surgery planned end of month     Past Medical History:   Allergies:  Review of patient's allergies indicates:   Allergen Reactions    Penicillins Other (See Comments)       Medications:  Current Outpatient Medications   Medication Sig Dispense Refill    apixaban (ELIQUIS) 2.5 mg Tab Take 1 tablet (2.5 mg total) by mouth 2 (two) times daily. 60 tablet 5     No current facility-administered medications for this visit.       Review of Systems   Constitutional:  Negative for chills and fever.   HENT:  Negative for congestion and sore throat.    Eyes:  Negative for pain.   Respiratory:  Negative for cough and shortness of breath.    Cardiovascular:  Negative for chest pain, palpitations and leg swelling.   Gastrointestinal:  Negative for abdominal pain, constipation, diarrhea, nausea and vomiting.   Genitourinary:  Negative for difficulty urinating, dysuria and hematuria.    Musculoskeletal:  Negative for back pain.   Skin:  Negative for rash.   Neurological:  Negative for light-headedness and headaches.   Hematological:  Does not bruise/bleed easily.   Psychiatric/Behavioral:  Negative for agitation.        Objective:      Vitals:   Vitals:    02/06/25 1329   BP: (!) 136/98   Pulse: (!) 114   Temp: 97.7 °F (36.5 °C)             Physical Exam  Constitutional:       Appearance: Normal appearance.   HENT:      Head: Normocephalic and atraumatic.   Cardiovascular:      Rate and Rhythm: Normal rate and regular rhythm.      Pulses: Normal pulses.      Heart sounds: Normal heart sounds.   Pulmonary:      Effort: Pulmonary effort is normal.      Breath sounds: Normal breath sounds.   Abdominal:      General: Abdomen is flat.      Palpations: Abdomen is soft.      Tenderness: There is no abdominal tenderness.   Musculoskeletal:         General: No swelling. Normal range of motion.      Cervical back: Normal range of motion and neck supple.      Right lower leg: No edema.      Left lower leg: No edema.   Skin:     General: Skin is warm and dry.   Neurological:      General: No focal deficit present.      Mental Status: He is alert and oriented to person, place, and time.   Psychiatric:         Mood and Affect: Mood normal.         Behavior: Behavior normal.       Component      Latest Ref [facility] 7/23/2024   WBC      3.90 - 12.70 K/uL 8.32    RBC      4.60 - 6.20 M/uL 4.99    Hemoglobin      14.0 - 18.0 g/dL 16.3    Hematocrit      40.0 - 54.0 % 49.2    MCV      82 - 98 fL 99 (H)    MCH      27.0 - 31.0 pg 32.7 (H)    MCHC      32.0 - 36.0 g/dL 33.1    RDW      11.5 - 14.5 % 13.1    Platelet Count      150 - 450 K/uL 222    MPV      9.2 - 12.9 fL 11.0    Immature Granulocytes      0.0 - 0.5 % 0.5    Gran # (ANC)      1.8 - 7.7 K/uL 4.4    Immature Grans (Abs)      0.00 - 0.04 K/uL 0.04    Lymph #      1.0 - 4.8 K/uL 2.8    Mono #      0.3 - 1.0 K/uL 0.7    Eos #      0.0 - 0.5 K/uL 0.2    Baso #       0.00 - 0.20 K/uL 0.08    nRBC      0 /100 WBC 0    Gran %      38.0 - 73.0 % 53.2    Lymph %      18.0 - 48.0 % 34.1    Mono %      4.0 - 15.0 % 8.4    Eos %      0.0 - 8.0 % 2.8    Basophil %      0.0 - 1.9 % 1.0    Differential Method Automated    Sodium      136 - 145 mmol/L 139    Potassium      3.5 - 5.1 mmol/L 4.8    Chloride      95 - 110 mmol/L 106    CO2      23 - 29 mmol/L 27    Glucose      70 - 110 mg/dL 84    BUN      8 - 23 mg/dL 15    Creatinine      0.5 - 1.4 mg/dL 0.9    Calcium      8.7 - 10.5 mg/dL 9.7    PROTEIN TOTAL      6.0 - 8.4 g/dL 7.6    Albumin      3.5 - 5.2 g/dL 4.0    BILIRUBIN TOTAL      0.1 - 1.0 mg/dL 0.7    ALP      55 - 135 U/L 55    AST      10 - 40 U/L 21    ALT      10 - 44 U/L 24    eGFR      >60 mL/min/1.73 m^2 >60.0    Anion Gap      8 - 16 mmol/L 6 (L)       Legend:  (H) High  (L) Low  Laboratory Data:   Assessment:       1. Recurrent DVT  2. On long term, anticoagulation         Plan:   1..2  has history of multiple DVT. First one was in 2017 and likely provoked by travel and a leg injury. He was on apixaban for 6-months and post-treatment U/S revealed no evidence of thrombus. Was diagnosed with his second DVT on 10/3/23. He had two long car rides prior to this recent DVT and a leg injury as well. He stopped multiple times on his car ride so that in itself unlikely to cause the DVT but he may have been more prone due to his leg injury.     - Beta-2 glycoprotein and anti-cardiolipin antibody negative in Oct 2023   - Given this is his second DVT and there is a family history of blood clots, he would benefit from continued treatment at maintenance dose long term ( 2.5mg BID)  -CBC normal on 7/23/24  -Uptodate with colonoscopy  -PSA normal in March 2023  -he will continue maintenance apixaban    Rx refill for Apixaban 2.5mg po bid provided  F/u in 6mos with labs

## 2025-02-10 ENCOUNTER — PATIENT MESSAGE (OUTPATIENT)
Dept: PREADMISSION TESTING | Facility: HOSPITAL | Age: 70
End: 2025-02-10
Payer: MEDICARE

## 2025-02-10 NOTE — ANESTHESIA PAT ROS NOTE
02/10/2025  Ron Christina is a 69 y.o., male.      Pre-op Assessment    I have reviewed the Patient Summary Reports.       I have reviewed the Medications.     Review of Systems  Anesthesia Hx:  No problems with previous Anesthesia  Past anesthesia record not available             Denies Personal Hx of Anesthesia complications.                    Social:  Non-Smoker, Social Alcohol Use       Hematology/Oncology:  Hematology Normal   Oncology Normal                                   EENT/Dental:   Allergies,  Hypertrophy of uvula          Cardiovascular:  Exercise tolerance: good       Denies CAD.       Denies Angina.     hyperlipidemia  Denies DONALDSON.  ECG has been reviewed. H/O DVT, taking Eliquis,  Elevated blood pressure reading without diagnosis of hypertension Patient not on beta blockers                          Pulmonary:  Pulmonary Normal   Denies COPD.  Denies Asthma.   Denies Shortness of breath.                  Renal/:  Renal/ Normal                 Hepatic/GI:     GERD   Hernia Repair Not Taking GLP-1 Agonists            Musculoskeletal:     Traumatic complete tear of left rotator cuff, i  Biceps tendinopathy, left              Neurological:  Neurology Normal   Denies CVA.    Denies Headaches. Denies Seizures.                                Endocrine:  Denies Diabetes. Denies Hypothyroidism.        Obesity / BMI > 30  Psych:  Psychiatric Normal                   Past Medical History:   Diagnosis Date    Allergy     COVID-19 07/2021    presumed Delta strain    Deep vein thrombosis     Elevated blood pressure reading without diagnosis of hypertension 09/28/2016    Good home BP log      Other hyperlipidemia 02/13/2014    ASCVD risk score indicates need for statin.  I have counseled the patient on this risk factor as well also recommendation for statin use for reduction of heart attack and stroke  risk.  He has verbally acknowledged this information and wishes NOT to pursue statins.  He is aware that this decision leaves him untreated for significant cardiovascular risk      Recurrent deep vein thrombosis (DVT) 10/06/2023    Positive family history.  Known to Hematology.  Recommended long-term DOAC      Umbilical hernia without obstruction and without gangrene 11/22/2019    Small.  Asymptomatic       Past Surgical History:   Procedure Laterality Date    HERNIA REPAIR         Anesthesia Assessment: Preoperative EQUATION    Planned Procedure: Procedure(s) (LRB):  REPAIR, ROTATOR CUFF, ARTHROSCOPIC (Left)  ARTHROSCOPY,SHOULDER,WITH BICEPS TENODESIS (Left)  Requested Anesthesia Type:General/Regional  Surgeon: ELIZABETH Brown MD  Service: Orthopedics  Known or anticipated Date of Surgery:2/21/2025    Surgeon notes: reviewed    Electronic QUestionnaire Assessment completed via nurse interview with patient.        Triage considerations:     The patient has no apparent active cardiac condition (No unstable coronary Syndrome such as severe unstable angina or recent [<1 month] myocardial infarction, decompensated CHF, severe valvular   disease or significant arrhythmia)    Previous anesthesia records:Not available    [facility]: within 1 month [facility]   Subspecialty notes: Hematology/Oncology    Other important co-morbidities: GERD, HLD and Obesity      Tests already available:  Results have been reviewed.       EKG 2/18/2025:  Vent. Rate :  76 BPM     Atrial Rate :  76 BPM      P-R Int : 184 ms          QRS Dur :  96 ms       QT Int : 406 ms       P-R-T Axes :  44  -4  46 degrees     QTcB Int : 456 ms   Normal sinus rhythm   Normal ECG   When compared with ECG of 19-Aug-2024 15:43,   Premature ventricular complexes are no longer Present   T wave inversion no longer evident in Anterior leads   QT has shortened   Confirmed by Osvaldo Mcleod ([id number]) on 2/18/2025 11:08:41 AM               Instructions given.  (See in Nurse's note) Preop medication instructions sent via portal message.     Optimization:  Anesthesia Preop Clinic Assessment Not Indicated    Medical Opinion Indicated: Yes, PCP       Sub-specialist consult indicated: No      Plan: Consultation:Patient's PCP for a statement of optimization               Patient  has previously scheduled Medical Appointment: 2/18/2025    Navigation: Patient is cleared/ optimized for surgery by PCP.    Patient is medicaly maximized and cleared for upcoming procedure, low risk. He has been given approval from hematology to hold EliFlo Water prior to procedure.       Ht: 6'1  Wt: 105.2 kg (231 lb)  BMI: 30.60

## 2025-02-11 ENCOUNTER — OFFICE VISIT (OUTPATIENT)
Dept: SPORTS MEDICINE | Facility: CLINIC | Age: 70
End: 2025-02-11
Payer: MEDICARE

## 2025-02-11 ENCOUNTER — PATIENT MESSAGE (OUTPATIENT)
Dept: INTERNAL MEDICINE | Facility: CLINIC | Age: 70
End: 2025-02-11
Payer: MEDICARE

## 2025-02-11 VITALS
BODY MASS INDEX: 30.51 KG/M2 | DIASTOLIC BLOOD PRESSURE: 94 MMHG | SYSTOLIC BLOOD PRESSURE: 136 MMHG | HEIGHT: 73 IN | HEART RATE: 87 BPM | WEIGHT: 230.19 LBS

## 2025-02-11 DIAGNOSIS — S46.012A TRAUMATIC COMPLETE TEAR OF LEFT ROTATOR CUFF, INITIAL ENCOUNTER: Primary | ICD-10-CM

## 2025-02-11 PROCEDURE — 99999 PR PBB SHADOW E&M-EST. PATIENT-LVL III: CPT | Mod: PBBFAC,HCNC,, | Performed by: PHYSICIAN ASSISTANT

## 2025-02-11 PROCEDURE — 99499 UNLISTED E&M SERVICE: CPT | Mod: HCNC,S$GLB,, | Performed by: PHYSICIAN ASSISTANT

## 2025-02-11 RX ORDER — SODIUM CHLORIDE 9 MG/ML
INJECTION, SOLUTION INTRAVENOUS CONTINUOUS
OUTPATIENT
Start: 2025-02-11

## 2025-02-11 RX ORDER — ASPIRIN 81 MG/1
81 TABLET ORAL 2 TIMES DAILY
Qty: 28 TABLET | Refills: 0 | Status: SHIPPED | OUTPATIENT
Start: 2025-02-11 | End: 2025-02-25

## 2025-02-11 RX ORDER — ONDANSETRON 4 MG/1
4 TABLET, ORALLY DISINTEGRATING ORAL EVERY 8 HOURS PRN
Qty: 30 TABLET | Refills: 0 | Status: SHIPPED | OUTPATIENT
Start: 2025-02-11

## 2025-02-11 RX ORDER — OXYCODONE HYDROCHLORIDE 5 MG/1
5-10 TABLET ORAL
Qty: 28 TABLET | Refills: 0 | Status: SHIPPED | OUTPATIENT
Start: 2025-02-11

## 2025-02-11 NOTE — H&P (VIEW-ONLY)
Ron Christina  is here for a completion of his perioperative paperwork. he  Is scheduled to undergo Left shoulder rotator cuff repair with possible xenograft tissue augmentation, biceps tenodesis, possible subacromial decompression on 2/21/2025.  He is a healthy individual and does need clearance for this procedure.     Patient has appointment with PCP for clearance on 2/18.    Hold Eliquis 48 hours pre-surgery and plan to restart 24 hours postop     Risks, indications and benefits of the surgical procedure were discussed with the patient. All questions with regard to surgery, rehab, expected return to functional activities, activities of daily living and recreational endeavors were answered to his satisfaction.    Discussed COVID-19 with the patient, they are aware of our current policies and procedures, were given the option of delaying surgery, and they elect to proceed.    Patient was informed and understands the risks of surgery are greater for patients with a current condition or hx of heart disease, obesity, clotting disorders, recurrent infections, steroid use, current or past smoking, and factors such as sedentary lifestyle and noncompliance with medications, therapy or f/u. The degree of the increased risk is hard to estimate w/ any degree of precision.    Once no other questions were asked, a brief history and physical exam was then performed.    PAST MEDICAL HISTORY:   Past Medical History:   Diagnosis Date    Allergy     COVID-19 07/2021    presumed Delta strain    Deep vein thrombosis     Elevated blood pressure reading without diagnosis of hypertension 09/28/2016    Good home BP log      Other hyperlipidemia 02/13/2014    ASCVD risk score indicates need for statin.  I have counseled the patient on this risk factor as well also recommendation for statin use for reduction of heart attack and stroke risk.  He has verbally acknowledged this information and wishes NOT to pursue statins.  He is aware that  this decision leaves him untreated for significant cardiovascular risk      Recurrent deep vein thrombosis (DVT) 10/06/2023    Positive family history.  Known to Hematology.  Recommended long-term DOAC      Umbilical hernia without obstruction and without gangrene 11/22/2019    Small.  Asymptomatic       PAST SURGICAL HISTORY:   Past Surgical History:   Procedure Laterality Date    HERNIA REPAIR       FAMILY HISTORY:   Family History   Problem Relation Name Age of Onset    Alcohol abuse Mother      Asthma Father       SOCIAL HISTORY:   Social History     Socioeconomic History    Marital status:    Tobacco Use    Smoking status: Never     Passive exposure: Never    Smokeless tobacco: Never   Substance and Sexual Activity    Alcohol use: Yes     Alcohol/week: 2.0 standard drinks of alcohol     Types: 1 Glasses of wine, 1 Shots of liquor per week     Comment: once a month    Drug use: No    Sexual activity: Not Currently     Social Drivers of Health     Financial Resource Strain: Low Risk  (4/22/2024)    Overall Financial Resource Strain (CARDIA)     Difficulty of Paying Living Expenses: Not hard at all   Food Insecurity: No Food Insecurity (4/22/2024)    Hunger Vital Sign     Worried About Running Out of Food in the Last Year: Never true     Ran Out of Food in the Last Year: Never true   Transportation Needs: No Transportation Needs (4/22/2024)    PRAPARE - Transportation     Lack of Transportation (Medical): No     Lack of Transportation (Non-Medical): No   Physical Activity: Insufficiently Active (4/22/2024)    Exercise Vital Sign     Days of Exercise per Week: 2 days     Minutes of Exercise per Session: 40 min   Stress: Stress Concern Present (4/22/2024)    [employer] - Occupational Stress Questionnaire     Feeling of Stress : To some extent   Housing Stability: Unknown (4/22/2024)    Housing Stability Vital Sign     Unable to Pay for Housing in the Last Year: No        MEDICATIONS:   Current Outpatient Medications:     apixaban (ELIQUIS) 2.5 mg Tab, Take 1 tablet (2.5 mg total) by mouth 2 (two) times daily., Disp: 60 tablet, Rfl: 5  ALLERGIES:   Review of patient's allergies indicates:   Allergen Reactions    Penicillins Other (See Comments)       Review of Systems   Constitution: Negative. Negative for chills, fever and night sweats.   HENT: Negative for congestion and headaches.    Eyes: Negative for blurred vision, left vision loss and right vision loss.   Cardiovascular: Negative for chest pain and syncope.   Respiratory: Negative for cough and shortness of breath.    Endocrine: Negative for polydipsia, polyphagia and polyuria.   Hematologic/Lymphatic: Negative for bleeding problem. Does not bruise/bleed easily.   Skin: Negative for dry skin, itching and rash.   Musculoskeletal: Negative for falls and muscle weakness.   Gastrointestinal: Negative for abdominal pain and bowel incontinence.   Genitourinary: Negative for bladder incontinence and nocturia.   Neurological: Negative for disturbances in coordination, loss of balance and seizures.   Psychiatric/Behavioral: Negative for depression. The patient does not have insomnia.    Allergic/Immunologic: Negative for hives and persistent infections.     PHYSICAL EXAM:  GEN: A&Ox3, WD WN NAD  HEENT: WNL  CHEST: CTAB, no W/R/R  HEART: RRR, no M/R/G   ABD: Soft, NT ND, BS x4 QUADS  MS: Refer to previous note for detailed MS exam  NEURO: CN II-XII intact       The surgical consent was then reviewed with the patient, who agreed with all the contents of the consent form and it was signed.     PHYSICAL THERAPY:  He was also instructed regarding physical therapy and will begin on POD#1-3. He is doing physical therapy at [facility].    POST OP CARE: Instructions were reviewed including care of the wound and dressing after surgery and when he can shower.     PAIN MANAGEMENT: Ron Christina was instructed  regarding the Polar ice unit that will be in place after surgery and his postoperative pain medications.     MEDICATION:  Roxicodone 5 mg 1-2 q 4 hours PRN for pain  Zofran 4 mg q 8 hours PRN for nausea and vomiting.  Aspirin 81mg BID x 2 weeks for DVT prophylaxis starting on the evening after surgery.      Post op meds to be delivered bedside prior to discharge. Deliver to family if patient is in surgery at 5pm.     Patient was instructed to purchase and take Colace to counter possible GI side effects of taking opiates.     DVT prophylaxis was discussed with the patient today including risk factors for developing DVTs and history of DVTs. The patient was asked if any specific recommendations were given from the doctor/s that did pre-operative surgical clearance.      If the patient was previously taking 81mg baby aspirin, they were told to not take additional baby aspirin, using the above stated aspirin and to restart the 81mg aspirin daily after completion of the aspirin dose.      Patient was also told to buy over the counter Prilosec medication and take it once daily for GI protection as long as they are taking NSAIDs or Aspirin.     The patient was told that narcotic pain medications may make them drowsy and instructions were given to not sign legal documents, drive or operate heavy machinery, cars, or equipment while under the influence of narcotic medications.     As there were no other questions to be asked, he was given my business card along with Dr. Brown's business card if he has any questions or concerns prior to surgery or in the postop period.

## 2025-02-11 NOTE — H&P
Ron Christina  is here for a completion of his perioperative paperwork. he  Is scheduled to undergo Left shoulder rotator cuff repair with possible xenograft tissue augmentation, biceps tenodesis, possible subacromial decompression on 2/21/2025.  He is a healthy individual and does need clearance for this procedure.     Patient has appointment with PCP for clearance on 2/18.    Hold Eliquis 48 hours pre-surgery and plan to restart 24 hours postop     Risks, indications and benefits of the surgical procedure were discussed with the patient. All questions with regard to surgery, rehab, expected return to functional activities, activities of daily living and recreational endeavors were answered to his satisfaction.    Discussed COVID-19 with the patient, they are aware of our current policies and procedures, were given the option of delaying surgery, and they elect to proceed.    Patient was informed and understands the risks of surgery are greater for patients with a current condition or hx of heart disease, obesity, clotting disorders, recurrent infections, steroid use, current or past smoking, and factors such as sedentary lifestyle and noncompliance with medications, therapy or f/u. The degree of the increased risk is hard to estimate w/ any degree of precision.    Once no other questions were asked, a brief history and physical exam was then performed.    PAST MEDICAL HISTORY:   Past Medical History:   Diagnosis Date    Allergy     COVID-19 07/2021    presumed Delta strain    Deep vein thrombosis     Elevated blood pressure reading without diagnosis of hypertension 09/28/2016    Good home BP log      Other hyperlipidemia 02/13/2014    ASCVD risk score indicates need for statin.  I have counseled the patient on this risk factor as well also recommendation for statin use for reduction of heart attack and stroke risk.  He has verbally acknowledged this information and wishes NOT to pursue statins.  He is aware that  this decision leaves him untreated for significant cardiovascular risk      Recurrent deep vein thrombosis (DVT) 10/06/2023    Positive family history.  Known to Hematology.  Recommended long-term DOAC      Umbilical hernia without obstruction and without gangrene 11/22/2019    Small.  Asymptomatic       PAST SURGICAL HISTORY:   Past Surgical History:   Procedure Laterality Date    HERNIA REPAIR       FAMILY HISTORY:   Family History   Problem Relation Name Age of Onset    Alcohol abuse Mother      Asthma Father       SOCIAL HISTORY:   Social History     Socioeconomic History    Marital status:    Tobacco Use    Smoking status: Never     Passive exposure: Never    Smokeless tobacco: Never   Substance and Sexual Activity    Alcohol use: Yes     Alcohol/week: 2.0 standard drinks of alcohol     Types: 1 Glasses of wine, 1 Shots of liquor per week     Comment: once a month    Drug use: No    Sexual activity: Not Currently     Social Drivers of Health     Financial Resource Strain: Low Risk  (4/22/2024)    Overall Financial Resource Strain (CARDIA)     Difficulty of Paying Living Expenses: Not hard at all   Food Insecurity: No Food Insecurity (4/22/2024)    Hunger Vital Sign     Worried About Running Out of Food in the Last Year: Never true     Ran Out of Food in the Last Year: Never true   Transportation Needs: No Transportation Needs (4/22/2024)    PRAPARE - Transportation     Lack of Transportation (Medical): No     Lack of Transportation (Non-Medical): No   Physical Activity: Insufficiently Active (4/22/2024)    Exercise Vital Sign     Days of Exercise per Week: 2 days     Minutes of Exercise per Session: 40 min   Stress: Stress Concern Present (4/22/2024)    [employer] [place] [employer] - Occupational Stress Questionnaire     Feeling of Stress : To some extent   Housing Stability: Unknown (4/22/2024)    Housing Stability Vital Sign     Unable to Pay for Housing in the Last Year: No        MEDICATIONS:   Current Outpatient Medications:     apixaban (ELIQUIS) 2.5 mg Tab, Take 1 tablet (2.5 mg total) by mouth 2 (two) times daily., Disp: 60 tablet, Rfl: 5  ALLERGIES:   Review of patient's allergies indicates:   Allergen Reactions    Penicillins Other (See Comments)       Review of Systems   Constitution: Negative. Negative for chills, fever and night sweats.   HENT: Negative for congestion and headaches.    Eyes: Negative for blurred vision, left vision loss and right vision loss.   Cardiovascular: Negative for chest pain and syncope.   Respiratory: Negative for cough and shortness of breath.    Endocrine: Negative for polydipsia, polyphagia and polyuria.   Hematologic/Lymphatic: Negative for bleeding problem. Does not bruise/bleed easily.   Skin: Negative for dry skin, itching and rash.   Musculoskeletal: Negative for falls and muscle weakness.   Gastrointestinal: Negative for abdominal pain and bowel incontinence.   Genitourinary: Negative for bladder incontinence and nocturia.   Neurological: Negative for disturbances in coordination, loss of balance and seizures.   Psychiatric/Behavioral: Negative for depression. The patient does not have insomnia.    Allergic/Immunologic: Negative for hives and persistent infections.     PHYSICAL EXAM:  GEN: A&Ox3, WD WN NAD  HEENT: WNL  CHEST: CTAB, no W/R/R  HEART: RRR, no M/R/G   ABD: Soft, NT ND, BS x4 QUADS  MS: Refer to previous note for detailed MS exam  NEURO: CN II-XII intact       The surgical consent was then reviewed with the patient, who agreed with all the contents of the consent form and it was signed.     PHYSICAL THERAPY:  He was also instructed regarding physical therapy and will begin on POD#1-3. He is doing physical therapy at [facility].    POST OP CARE: Instructions were reviewed including care of the wound and dressing after surgery and when he can shower.     PAIN MANAGEMENT: Ron Christina was instructed  regarding the Polar ice unit that will be in place after surgery and his postoperative pain medications.     MEDICATION:  Roxicodone 5 mg 1-2 q 4 hours PRN for pain  Zofran 4 mg q 8 hours PRN for nausea and vomiting.  Aspirin 81mg BID x 2 weeks for DVT prophylaxis starting on the evening after surgery.      Post op meds to be delivered bedside prior to discharge. Deliver to family if patient is in surgery at 5pm.     Patient was instructed to purchase and take Colace to counter possible GI side effects of taking opiates.     DVT prophylaxis was discussed with the patient today including risk factors for developing DVTs and history of DVTs. The patient was asked if any specific recommendations were given from the doctor/s that did pre-operative surgical clearance.      If the patient was previously taking 81mg baby aspirin, they were told to not take additional baby aspirin, using the above stated aspirin and to restart the 81mg aspirin daily after completion of the aspirin dose.      Patient was also told to buy over the counter Prilosec medication and take it once daily for GI protection as long as they are taking NSAIDs or Aspirin.     The patient was told that narcotic pain medications may make them drowsy and instructions were given to not sign legal documents, drive or operate heavy machinery, cars, or equipment while under the influence of narcotic medications.     As there were no other questions to be asked, he was given my business card along with Dr. Brown's business card if he has any questions or concerns prior to surgery or in the postop period.

## 2025-02-18 ENCOUNTER — OFFICE VISIT (OUTPATIENT)
Dept: INTERNAL MEDICINE | Facility: CLINIC | Age: 70
End: 2025-02-18
Payer: MEDICARE

## 2025-02-18 ENCOUNTER — LAB VISIT (OUTPATIENT)
Dept: LAB | Facility: HOSPITAL | Age: 70
End: 2025-02-18
Payer: MEDICARE

## 2025-02-18 ENCOUNTER — PATIENT MESSAGE (OUTPATIENT)
Dept: INTERNAL MEDICINE | Facility: CLINIC | Age: 70
End: 2025-02-18
Payer: MEDICARE

## 2025-02-18 VITALS
HEART RATE: 77 BPM | DIASTOLIC BLOOD PRESSURE: 85 MMHG | OXYGEN SATURATION: 97 % | SYSTOLIC BLOOD PRESSURE: 130 MMHG | BODY MASS INDEX: 30.87 KG/M2 | HEIGHT: 73 IN | WEIGHT: 232.94 LBS

## 2025-02-18 DIAGNOSIS — I82.409 RECURRENT DEEP VEIN THROMBOSIS (DVT): Chronic | ICD-10-CM

## 2025-02-18 DIAGNOSIS — R03.0 ELEVATED BLOOD PRESSURE READING WITHOUT DIAGNOSIS OF HYPERTENSION: Chronic | ICD-10-CM

## 2025-02-18 DIAGNOSIS — Z01.818 PRE-OPERATIVE EXAMINATION: Primary | ICD-10-CM

## 2025-02-18 DIAGNOSIS — Z01.818 PRE-OPERATIVE EXAMINATION: ICD-10-CM

## 2025-02-18 DIAGNOSIS — E78.49 OTHER HYPERLIPIDEMIA: Chronic | ICD-10-CM

## 2025-02-18 LAB
ALBUMIN SERPL BCP-MCNC: 4 G/DL (ref 3.5–5.2)
ALP SERPL-CCNC: 53 U/L (ref 40–150)
ALT SERPL W/O P-5'-P-CCNC: 24 U/L (ref 10–44)
ANION GAP SERPL CALC-SCNC: 10 MMOL/L (ref 8–16)
AST SERPL-CCNC: 35 U/L (ref 10–40)
BASOPHILS # BLD AUTO: 0.07 K/UL (ref 0–0.2)
BASOPHILS NFR BLD: 1 % (ref 0–1.9)
BILIRUB SERPL-MCNC: 0.5 MG/DL (ref 0.1–1)
BUN SERPL-MCNC: 19 MG/DL (ref 8–23)
CALCIUM SERPL-MCNC: 9.7 MG/DL (ref 8.7–10.5)
CHLORIDE SERPL-SCNC: 105 MMOL/L (ref 95–110)
CO2 SERPL-SCNC: 23 MMOL/L (ref 23–29)
CREAT SERPL-MCNC: 0.9 MG/DL (ref 0.5–1.4)
DIFFERENTIAL METHOD BLD: ABNORMAL
EOSINOPHIL # BLD AUTO: 0.2 K/UL (ref 0–0.5)
EOSINOPHIL NFR BLD: 2.6 % (ref 0–8)
ERYTHROCYTE [DISTWIDTH] IN BLOOD BY AUTOMATED COUNT: 12.8 % (ref 11.5–14.5)
EST. GFR  (NO RACE VARIABLE): >60 ML/MIN/1.73 M^2
GLUCOSE SERPL-MCNC: 88 MG/DL (ref 70–110)
HCT VFR BLD AUTO: 47.1 % (ref 40–54)
HGB BLD-MCNC: 16.2 G/DL (ref 14–18)
IMM GRANULOCYTES # BLD AUTO: 0.03 K/UL (ref 0–0.04)
IMM GRANULOCYTES NFR BLD AUTO: 0.4 % (ref 0–0.5)
LYMPHOCYTES # BLD AUTO: 2.7 K/UL (ref 1–4.8)
LYMPHOCYTES NFR BLD: 39.1 % (ref 18–48)
MCH RBC QN AUTO: 33.6 PG (ref 27–31)
MCHC RBC AUTO-ENTMCNC: 34.4 G/DL (ref 32–36)
MCV RBC AUTO: 98 FL (ref 82–98)
MONOCYTES # BLD AUTO: 0.7 K/UL (ref 0.3–1)
MONOCYTES NFR BLD: 10.1 % (ref 4–15)
NEUTROPHILS # BLD AUTO: 3.2 K/UL (ref 1.8–7.7)
NEUTROPHILS NFR BLD: 46.8 % (ref 38–73)
NRBC BLD-RTO: 0 /100 WBC
OHS QRS DURATION: 96 MS
OHS QTC CALCULATION: 456 MS
PLATELET # BLD AUTO: 218 K/UL (ref 150–450)
PMV BLD AUTO: 10.6 FL (ref 9.2–12.9)
POTASSIUM SERPL-SCNC: 4.5 MMOL/L (ref 3.5–5.1)
PROT SERPL-MCNC: 7.6 G/DL (ref 6–8.4)
RBC # BLD AUTO: 4.82 M/UL (ref 4.6–6.2)
SODIUM SERPL-SCNC: 138 MMOL/L (ref 136–145)
WBC # BLD AUTO: 6.91 K/UL (ref 3.9–12.7)

## 2025-02-18 PROCEDURE — 80053 COMPREHEN METABOLIC PANEL: CPT | Mod: HCNC | Performed by: NURSE PRACTITIONER

## 2025-02-18 PROCEDURE — 85025 COMPLETE CBC W/AUTO DIFF WBC: CPT | Mod: HCNC | Performed by: NURSE PRACTITIONER

## 2025-02-18 PROCEDURE — 36415 COLL VENOUS BLD VENIPUNCTURE: CPT | Mod: HCNC | Performed by: NURSE PRACTITIONER

## 2025-02-18 NOTE — PROGRESS NOTES
" Subjective:      Ron Christina is a 69 y.o. male who presents to the office today for a preoperative consultation at the request of surgeon Dr. Brown who plans on performing left rotator cuff repair on February 21. This consultation is requested for the specific conditions prompting preoperative evaluation (i.e. because of potential affect on operative risk): recurrent DVT on Eliquis, elevated BP without diagnosis of HTN. Planned anesthesia: general. The patient has the following known anesthesia issues:  no known complications with anesthesia . Patients bleeding risk: no recent abnormal bleeding. Patient does not have objections to receiving blood products if needed.    NSAID/OAC use: Eliquis, cleared by hematology to hold prior to surgery.  Corticosteroid use: None.  Recent fever/illness/antibiotics: None.    The following portions of the patient's history were reviewed and updated as appropriate: allergies, current medications, past family history, past medical history, past social history, past surgical history, and problem list.    Review of Systems  A comprehensive review of systems was negative.      Objective:      /85 (BP Location: Left arm)   Pulse 77   Ht 6' 1" (1.854 m)   Wt 105.7 kg (232 lb 14.7 oz)   SpO2 97%   BMI 30.73 kg/m²   General appearance: alert, appears stated age, cooperative, and no distress  Neck: no adenopathy, no carotid bruit, no JVD, and supple, symmetrical, trachea midline  Lungs: clear to auscultation bilaterally  Heart: regular rate and rhythm, S1, S2 normal, no murmur, click, rub or gallop  Extremities: extremities normal, atraumatic, no cyanosis or edema  Pulses: 2+ and symmetric  Skin: Skin color, texture, turgor normal. No rashes or lesions  Neurologic: Grossly normal    Predictors of intubation difficulty:   Morbid obesity? no   Anatomically abnormal facies? no   Prominent incisors? no   Receding mandible? no   Short, thick neck? no   Neck range of motion: " normal   Mallampati score: II (hard and soft palate, upper portion of tonsils anduvula visible)   Mouth opening: WNL   Dentition: No chipped, loose, or missing teeth.    Cardiographics  ECG: normal sinus rhythm, no blocks or conduction defects, no ischemic changes      Lab Review   CBC and CMP WNL.     Assessment:        69 y.o. male with planned surgery as above.    Known risk factors for perioperative complications: None    Difficulty with intubation is not anticipated.    Cardiac Risk Estimation: low risk    Current medications which may produce withdrawal symptoms if withheld perioperatively: none      Plan:     Pre-operative examination  Clearance pending work-up below.  -     CBC Auto Differential; Future; Expected date: 02/18/2025  -     Comprehensive Metabolic Panel; Future; Expected date: 02/18/2025  -     IN OFFICE EKG 12-LEAD (to Muse)    Recurrent deep vein thrombosis (DVT)  Per hematology okay to hold Eliquis prior to procedure.    Other hyperlipidemia  The current medical regimen is effective;  continue present plan and medications.    Elevated blood pressure reading without diagnosis of hypertension  Repeat BP WNL.    2/18/25: Clearance pending labs.    2/19/25: Patient is medicaly maximized and cleared for upcoming procedure, low risk. He has been given approval from hematology to hold Eliquis prior to procedure.

## 2025-02-20 ENCOUNTER — TELEPHONE (OUTPATIENT)
Dept: SPORTS MEDICINE | Facility: CLINIC | Age: 70
End: 2025-02-20
Payer: MEDICARE

## 2025-02-20 ENCOUNTER — ANESTHESIA EVENT (OUTPATIENT)
Dept: SURGERY | Facility: HOSPITAL | Age: 70
End: 2025-02-20
Payer: MEDICARE

## 2025-02-20 NOTE — TELEPHONE ENCOUNTER
Spoke c pt. Informed pt of 7:30 arrival time for 02/21/25 surgery at the [facility]. Reminded pt of NPO status. Pt expressed understanding & was thankful.

## 2025-02-21 ENCOUNTER — HOSPITAL ENCOUNTER (OUTPATIENT)
Facility: HOSPITAL | Age: 70
Discharge: HOME | End: 2025-02-21
Attending: ORTHOPAEDIC SURGERY | Admitting: ORTHOPAEDIC SURGERY
Payer: MEDICARE

## 2025-02-21 ENCOUNTER — ANESTHESIA (OUTPATIENT)
Dept: SURGERY | Facility: HOSPITAL | Age: 70
End: 2025-02-21
Payer: MEDICARE

## 2025-02-21 VITALS
OXYGEN SATURATION: 92 % | HEART RATE: 86 BPM | BODY MASS INDEX: 29.82 KG/M2 | TEMPERATURE: 98 F | SYSTOLIC BLOOD PRESSURE: 133 MMHG | DIASTOLIC BLOOD PRESSURE: 79 MMHG | WEIGHT: 225 LBS | RESPIRATION RATE: 12 BRPM | HEIGHT: 73 IN

## 2025-02-21 DIAGNOSIS — S46.012A TRAUMATIC COMPLETE TEAR OF LEFT ROTATOR CUFF, INITIAL ENCOUNTER: ICD-10-CM

## 2025-02-21 PROCEDURE — 29828 SHO ARTHRS SRG BICP TENODSIS: CPT | Mod: 51,HCNC,LT, | Performed by: ORTHOPAEDIC SURGERY

## 2025-02-21 PROCEDURE — 27201423 OPTIME MED/SURG SUP & DEVICES STERILE SUPPLY: Performed by: ORTHOPAEDIC SURGERY

## 2025-02-21 PROCEDURE — 36000710: Performed by: ORTHOPAEDIC SURGERY

## 2025-02-21 PROCEDURE — 29823 SHO ARTHRS SRG XTNSV DBRDMT: CPT | Mod: 51,HCNC,LT, | Performed by: ORTHOPAEDIC SURGERY

## 2025-02-21 PROCEDURE — 94761 N-INVAS EAR/PLS OXIMETRY MLT: CPT

## 2025-02-21 PROCEDURE — 63600175 PHARM REV CODE 636 W HCPCS: Performed by: PHYSICIAN ASSISTANT

## 2025-02-21 PROCEDURE — 25000003 PHARM REV CODE 250: Performed by: NURSE ANESTHETIST, CERTIFIED REGISTERED

## 2025-02-21 PROCEDURE — 63600175 PHARM REV CODE 636 W HCPCS: Performed by: STUDENT IN AN ORGANIZED HEALTH CARE EDUCATION/TRAINING PROGRAM

## 2025-02-21 PROCEDURE — 37000009 HC ANESTHESIA EA ADD 15 MINS: Performed by: ORTHOPAEDIC SURGERY

## 2025-02-21 PROCEDURE — 63600175 PHARM REV CODE 636 W HCPCS: Performed by: NURSE ANESTHETIST, CERTIFIED REGISTERED

## 2025-02-21 PROCEDURE — 29823 SHO ARTHRS SRG XTNSV DBRDMT: CPT | Mod: 82,HCNC,LT, | Performed by: STUDENT IN AN ORGANIZED HEALTH CARE EDUCATION/TRAINING PROGRAM

## 2025-02-21 PROCEDURE — 37000008 HC ANESTHESIA 1ST 15 MINUTES: Performed by: ORTHOPAEDIC SURGERY

## 2025-02-21 PROCEDURE — 25000003 PHARM REV CODE 250: Performed by: STUDENT IN AN ORGANIZED HEALTH CARE EDUCATION/TRAINING PROGRAM

## 2025-02-21 PROCEDURE — 25000003 PHARM REV CODE 250: Performed by: PHYSICIAN ASSISTANT

## 2025-02-21 PROCEDURE — 63600175 PHARM REV CODE 636 W HCPCS: Performed by: ORTHOPAEDIC SURGERY

## 2025-02-21 PROCEDURE — 71000016 HC POSTOP RECOV ADDL HR: Performed by: ORTHOPAEDIC SURGERY

## 2025-02-21 PROCEDURE — 64415 NJX AA&/STRD BRCH PLXS IMG: CPT | Performed by: STUDENT IN AN ORGANIZED HEALTH CARE EDUCATION/TRAINING PROGRAM

## 2025-02-21 PROCEDURE — 36000711: Performed by: ORTHOPAEDIC SURGERY

## 2025-02-21 PROCEDURE — C1889 IMPLANT/INSERT DEVICE, NOC: HCPCS | Performed by: ORTHOPAEDIC SURGERY

## 2025-02-21 PROCEDURE — 29826 SHO ARTHRS SRG DECOMPRESSION: CPT | Mod: HCNC,LT,, | Performed by: ORTHOPAEDIC SURGERY

## 2025-02-21 PROCEDURE — 99900035 HC TECH TIME PER 15 MIN (STAT)

## 2025-02-21 PROCEDURE — 29827 SHO ARTHRS SRG RT8TR CUF RPR: CPT | Mod: 22,HCNC,LT, | Performed by: ORTHOPAEDIC SURGERY

## 2025-02-21 PROCEDURE — 71000033 HC RECOVERY, INTIAL HOUR: Performed by: ORTHOPAEDIC SURGERY

## 2025-02-21 PROCEDURE — C1713 ANCHOR/SCREW BN/BN,TIS/BN: HCPCS | Performed by: ORTHOPAEDIC SURGERY

## 2025-02-21 PROCEDURE — 71000039 HC RECOVERY, EACH ADD'L HOUR: Performed by: ORTHOPAEDIC SURGERY

## 2025-02-21 PROCEDURE — 71000015 HC POSTOP RECOV 1ST HR: Performed by: ORTHOPAEDIC SURGERY

## 2025-02-21 DEVICE — SWIVELOCK, SP BC KL 5.5MM
Type: IMPLANTABLE DEVICE | Site: SHOULDER | Status: FUNCTIONAL
Brand: ARTHREX®

## 2025-02-21 DEVICE — BONE ANCHORS 3 WITH ARTHROSCOPIC DELIVERY SYSTEM ADVANCED
Type: IMPLANTABLE DEVICE | Site: SHOULDER | Status: FUNCTIONAL
Brand: BONE ANCHORS WITH ARTHROSCOPIC DELIVERY SYSTEM - ADVANCED

## 2025-02-21 DEVICE — IMPLANTABLE DEVICE
Type: IMPLANTABLE DEVICE | Site: SHOULDER | Status: FUNCTIONAL
Brand: ROTATION MEDICAL TENDON STAPLES

## 2025-02-21 DEVICE — 5.5MM BC CORKSCREW FT W/ SUTURETAPE
Type: IMPLANTABLE DEVICE | Site: SHOULDER | Status: FUNCTIONAL
Brand: ARTHREX®

## 2025-02-21 DEVICE — BIO-COMPSI CRKSCRW FT 6.5X 14.7MM
Type: IMPLANTABLE DEVICE | Site: SHOULDER | Status: FUNCTIONAL
Brand: ARTHREX®

## 2025-02-21 DEVICE — IMPLANTABLE DEVICE
Type: IMPLANTABLE DEVICE | Site: SHOULDER | Status: FUNCTIONAL
Brand: BIOINDUCTIVE IMPLANT WITH ARTHROSCOPIC DELIVERY SYSTEM - MEDIUM

## 2025-02-21 RX ORDER — LIDOCAINE HYDROCHLORIDE 20 MG/ML
INJECTION INTRAVENOUS
Status: DISCONTINUED | OUTPATIENT
Start: 2025-02-21 | End: 2025-02-21

## 2025-02-21 RX ORDER — SODIUM CHLORIDE 9 MG/ML
INJECTION, SOLUTION INTRAVENOUS CONTINUOUS
Status: DISCONTINUED | OUTPATIENT
Start: 2025-02-21 | End: 2025-02-21 | Stop reason: HOSPADM

## 2025-02-21 RX ORDER — KETAMINE HCL IN 0.9 % NACL 50 MG/5 ML
SYRINGE (ML) INTRAVENOUS
Status: DISCONTINUED | OUTPATIENT
Start: 2025-02-21 | End: 2025-02-21

## 2025-02-21 RX ORDER — TRANEXAMIC ACID 100 MG/ML
INJECTION, SOLUTION INTRAVENOUS
Status: DISCONTINUED | OUTPATIENT
Start: 2025-02-21 | End: 2025-02-21

## 2025-02-21 RX ORDER — MIDAZOLAM HYDROCHLORIDE 1 MG/ML
1 INJECTION, SOLUTION INTRAMUSCULAR; INTRAVENOUS
Status: DISCONTINUED | OUTPATIENT
Start: 2025-02-21 | End: 2025-02-21 | Stop reason: HOSPADM

## 2025-02-21 RX ORDER — HYDROMORPHONE HYDROCHLORIDE 1 MG/ML
0.2 INJECTION, SOLUTION INTRAMUSCULAR; INTRAVENOUS; SUBCUTANEOUS EVERY 5 MIN PRN
Status: DISCONTINUED | OUTPATIENT
Start: 2025-02-21 | End: 2025-02-21 | Stop reason: HOSPADM

## 2025-02-21 RX ORDER — GLUCAGON 1 MG
1 KIT INJECTION
Status: DISCONTINUED | OUTPATIENT
Start: 2025-02-21 | End: 2025-02-21 | Stop reason: HOSPADM

## 2025-02-21 RX ORDER — ROCURONIUM BROMIDE 10 MG/ML
INJECTION, SOLUTION INTRAVENOUS
Status: DISCONTINUED | OUTPATIENT
Start: 2025-02-21 | End: 2025-02-21

## 2025-02-21 RX ORDER — ACETAMINOPHEN 500 MG
1000 TABLET ORAL
Status: COMPLETED | OUTPATIENT
Start: 2025-02-21 | End: 2025-02-21

## 2025-02-21 RX ORDER — ONDANSETRON HYDROCHLORIDE 2 MG/ML
INJECTION, SOLUTION INTRAVENOUS
Status: DISCONTINUED | OUTPATIENT
Start: 2025-02-21 | End: 2025-02-21

## 2025-02-21 RX ORDER — FENTANYL CITRATE 50 UG/ML
25 INJECTION, SOLUTION INTRAMUSCULAR; INTRAVENOUS EVERY 5 MIN PRN
Status: DISCONTINUED | OUTPATIENT
Start: 2025-02-21 | End: 2025-02-21 | Stop reason: HOSPADM

## 2025-02-21 RX ORDER — CARBOXYMETHYLCELLULOSE SODIUM 10 MG/ML
GEL OPHTHALMIC
Status: DISCONTINUED | OUTPATIENT
Start: 2025-02-21 | End: 2025-02-21

## 2025-02-21 RX ORDER — CELECOXIB 200 MG/1
400 CAPSULE ORAL
Status: COMPLETED | OUTPATIENT
Start: 2025-02-21 | End: 2025-02-21

## 2025-02-21 RX ORDER — EPINEPHRINE 1 MG/ML
INJECTION, SOLUTION, CONCENTRATE INTRAVENOUS
Status: DISCONTINUED | OUTPATIENT
Start: 2025-02-21 | End: 2025-02-21 | Stop reason: HOSPADM

## 2025-02-21 RX ORDER — PROPOFOL 10 MG/ML
VIAL (ML) INTRAVENOUS
Status: DISCONTINUED | OUTPATIENT
Start: 2025-02-21 | End: 2025-02-21

## 2025-02-21 RX ORDER — FENTANYL CITRATE 50 UG/ML
100 INJECTION, SOLUTION INTRAMUSCULAR; INTRAVENOUS
Status: DISCONTINUED | OUTPATIENT
Start: 2025-02-21 | End: 2025-02-21 | Stop reason: HOSPADM

## 2025-02-21 RX ORDER — DEXAMETHASONE SODIUM PHOSPHATE 4 MG/ML
INJECTION, SOLUTION INTRA-ARTICULAR; INTRALESIONAL; INTRAMUSCULAR; INTRAVENOUS; SOFT TISSUE
Status: DISCONTINUED | OUTPATIENT
Start: 2025-02-21 | End: 2025-02-21

## 2025-02-21 RX ORDER — CEFAZOLIN 2 G/1
2 INJECTION, POWDER, FOR SOLUTION INTRAMUSCULAR; INTRAVENOUS
Status: DISCONTINUED | OUTPATIENT
Start: 2025-02-21 | End: 2025-02-21 | Stop reason: HOSPADM

## 2025-02-21 RX ORDER — ONDANSETRON HYDROCHLORIDE 2 MG/ML
4 INJECTION, SOLUTION INTRAVENOUS DAILY PRN
Status: DISCONTINUED | OUTPATIENT
Start: 2025-02-21 | End: 2025-02-21 | Stop reason: HOSPADM

## 2025-02-21 RX ORDER — FAMOTIDINE 10 MG/ML
INJECTION INTRAVENOUS
Status: DISCONTINUED | OUTPATIENT
Start: 2025-02-21 | End: 2025-02-21

## 2025-02-21 RX ORDER — OXYCODONE HYDROCHLORIDE 5 MG/1
5 TABLET ORAL
Status: DISCONTINUED | OUTPATIENT
Start: 2025-02-21 | End: 2025-02-21 | Stop reason: HOSPADM

## 2025-02-21 RX ORDER — NEOSTIGMINE METHYLSULFATE 0.5 MG/ML
INJECTION INTRAVENOUS
Status: DISCONTINUED | OUTPATIENT
Start: 2025-02-21 | End: 2025-02-21

## 2025-02-21 RX ORDER — BUPIVACAINE HYDROCHLORIDE 2.5 MG/ML
INJECTION, SOLUTION EPIDURAL; INFILTRATION; INTRACAUDAL
Status: COMPLETED | OUTPATIENT
Start: 2025-02-21 | End: 2025-02-21

## 2025-02-21 RX ADMIN — CARBOXYMETHYLCELLULOSE SODIUM 4 DROP: 10 GEL OPHTHALMIC at 09:02

## 2025-02-21 RX ADMIN — MIDAZOLAM 2 MG: 1 INJECTION INTRAMUSCULAR; INTRAVENOUS at 09:02

## 2025-02-21 RX ADMIN — Medication 20 MG: at 09:02

## 2025-02-21 RX ADMIN — ACETAMINOPHEN 1000 MG: 500 TABLET ORAL at 08:02

## 2025-02-21 RX ADMIN — FAMOTIDINE 20 MG: 10 INJECTION, SOLUTION INTRAVENOUS at 09:02

## 2025-02-21 RX ADMIN — GLYCOPYRROLATE 0.4 MG: 0.2 INJECTION, SOLUTION INTRAMUSCULAR; INTRAVENOUS at 11:02

## 2025-02-21 RX ADMIN — CELECOXIB 400 MG: 200 CAPSULE ORAL at 08:02

## 2025-02-21 RX ADMIN — FENTANYL CITRATE 50 MCG: 50 INJECTION, SOLUTION INTRAMUSCULAR; INTRAVENOUS at 09:02

## 2025-02-21 RX ADMIN — SODIUM CHLORIDE: 0.9 INJECTION, SOLUTION INTRAVENOUS at 08:02

## 2025-02-21 RX ADMIN — DEXAMETHASONE SODIUM PHOSPHATE 8 MG: 4 INJECTION, SOLUTION INTRAMUSCULAR; INTRAVENOUS at 09:02

## 2025-02-21 RX ADMIN — SODIUM CHLORIDE, SODIUM GLUCONATE, SODIUM ACETATE, POTASSIUM CHLORIDE, MAGNESIUM CHLORIDE, SODIUM PHOSPHATE, DIBASIC, AND POTASSIUM PHOSPHATE: .53; .5; .37; .037; .03; .012; .00082 INJECTION, SOLUTION INTRAVENOUS at 10:02

## 2025-02-21 RX ADMIN — OXYCODONE 5 MG: 5 TABLET ORAL at 12:02

## 2025-02-21 RX ADMIN — ROCURONIUM BROMIDE 40 MG: 10 INJECTION, SOLUTION INTRAVENOUS at 09:02

## 2025-02-21 RX ADMIN — SODIUM CHLORIDE: 9 INJECTION, SOLUTION INTRAVENOUS at 08:02

## 2025-02-21 RX ADMIN — FENTANYL CITRATE 100 MCG: 50 INJECTION, SOLUTION INTRAMUSCULAR; INTRAVENOUS at 09:02

## 2025-02-21 RX ADMIN — LIDOCAINE HYDROCHLORIDE 100 MG: 20 INJECTION INTRAVENOUS at 09:02

## 2025-02-21 RX ADMIN — CEFAZOLIN 2 G: 2 INJECTION, POWDER, FOR SOLUTION INTRAMUSCULAR; INTRAVENOUS at 09:02

## 2025-02-21 RX ADMIN — FENTANYL CITRATE 50 MCG: 50 INJECTION, SOLUTION INTRAMUSCULAR; INTRAVENOUS at 11:02

## 2025-02-21 RX ADMIN — PROPOFOL 130 MG: 10 INJECTION, EMULSION INTRAVENOUS at 09:02

## 2025-02-21 RX ADMIN — ONDANSETRON 4 MG: 2 INJECTION INTRAMUSCULAR; INTRAVENOUS at 09:02

## 2025-02-21 RX ADMIN — TRANEXAMIC ACID 1000 MG: 100 INJECTION INTRAVENOUS at 10:02

## 2025-02-21 RX ADMIN — NEOSTIGMINE METHYLSULFATE 3.5 MG: 0.5 INJECTION INTRAVENOUS at 11:02

## 2025-02-21 RX ADMIN — BUPIVACAINE HYDROCHLORIDE 20 ML: 2.5 INJECTION, SOLUTION EPIDURAL; INFILTRATION; INTRACAUDAL; PERINEURAL at 09:02

## 2025-02-21 RX ADMIN — ONDANSETRON 4 MG: 2 INJECTION INTRAMUSCULAR; INTRAVENOUS at 02:02

## 2025-02-21 NOTE — ANESTHESIA PROCEDURE NOTES
Intubation    Date/Time: 2/21/2025 9:31 AM    Performed by: Apoorva Lacy CRNA  Authorized by: Jeff Soto MD    Intubation:     Induction:  Intravenous    Intubated:  Postinduction    Mask Ventilation:  Moderately difficult with oral airway    Attempts:  1    Attempted By:  CRNA    Method of Intubation:  Video laryngoscopy    Blade:  Grossman 3    Laryngeal View Grade: Grade I - full view of cords      Difficult Airway Encountered?: No      Complications:  None    Airway Device:  Oral endotracheal tube    Airway Device Size:  7.5    Style/Cuff Inflation:  Cuffed (inflated to minimal occlusive pressure)    Inflation Amount (mL):  8    Tube secured:  23    Secured at:  The lips    Placement Verified By:  Capnometry    Complicating Factors:  None    Findings Post-Intubation:  BS equal bilateral and atraumatic/condition of teeth unchanged

## 2025-02-21 NOTE — BRIEF OP NOTE
[place] - Surgery ([facility])  Brief Operative Note    Surgery Date: 2/21/2025     Surgeons and Role:     * ELIZABETH Brown MD - Primary    Assisting Surgeon: None    Pre-op Diagnosis:  Traumatic complete tear of left rotator cuff, initial encounter [S46.012A]  Biceps tendinopathy, left [M67.922]    Post-op Diagnosis:  Post-Op Diagnosis Codes:     * Traumatic complete tear of left rotator cuff, initial encounter [S46.012A]     * Biceps tendinopathy, left [M67.922]    Procedure(s) (LRB):  REPAIR, ROTATOR CUFF, ARTHROSCOPIC- POLAR CARE (Left)  ARTHROSCOPY,SHOULDER,WITH BICEPS TENODESIS (Left)  DEBRIDEMENT, SHOULDER, ARTHROSCOPIC (Left)  ARTHROSCOPY, SHOULDER, WITH SUBACROMIAL SPACE DECOMPRESSION (Left)    Anesthesia: General/Regional    Operative Findings: left shoulder rotator cuff tear    Estimated Blood Loss: Minimal         Specimens:   Specimen (24h ago, onward)      None              Discharge Note    OUTCOME: Patient tolerated treatment/procedure well without complication and is now ready for discharge.    DISPOSITION: Home or Self Care    FINAL DIAGNOSIS:  <principal problem not specified>    FOLLOWUP: In clinic    DISCHARGE INSTRUCTIONS:    Discharge Procedure Orders   Keep surgical extremity elevated     Ice to affected area     Notify your health care provider if you experience any of the following:  temperature >100.4     Notify your health care provider if you experience any of the following:  persistent nausea and vomiting or diarrhea     Notify your health care provider if you experience any of the following:  severe uncontrolled pain     Notify your health care provider if you experience any of the following:  redness, tenderness, or signs of infection (pain, swelling, redness, odor or green/yellow discharge around incision site)     Leave dressing on - Keep it clean, dry, and intact until clinic visit     Weight bearing restrictions (specify):   Order Comments: Nwb in sling

## 2025-02-21 NOTE — ANESTHESIA POSTPROCEDURE EVALUATION
Anesthesia Post Evaluation    Patient: Ron Christina    Procedure(s) Performed: Procedure(s) (LRB):  REPAIR, ROTATOR CUFF, ARTHROSCOPIC- POLAR CARE (Left)  ARTHROSCOPY,SHOULDER,WITH BICEPS TENODESIS (Left)  DEBRIDEMENT, SHOULDER, ARTHROSCOPIC (Left)  ARTHROSCOPY, SHOULDER, WITH SUBACROMIAL SPACE DECOMPRESSION (Left)    Final Anesthesia Type: general      Patient location during evaluation: PACU  Patient participation: Yes- Able to Participate  Level of consciousness: awake and alert and oriented  Post-procedure vital signs: reviewed and stable  Pain management: adequate  Airway patency: patent    PONV status at discharge: No PONV  Anesthetic complications: no      Cardiovascular status: hemodynamically stable  Respiratory status: nasal cannula  Hydration status: euvolemic  Follow-up not needed.          Vitals Value Taken Time   /79 02/21/25 13:32   Temp 36.9 °C (98.4 °F) 02/21/25 13:30   Pulse 92 02/21/25 13:45   Resp 19 02/21/25 13:44   SpO2 91 % 02/21/25 13:45   Vitals shown include unfiled device data.      Event Time   Out of Recovery 12:59:00         Pain/Earl Score: Pain Rating Prior to Med Admin: 2 (2/21/2025 12:34 PM)  Earl Score: 10 (2/21/2025  1:47 PM)

## 2025-02-21 NOTE — PLAN OF CARE
Pre op complete. Questions answered. Resting comfortably. Call light in reach. Personal belongings placed in locker.

## 2025-02-21 NOTE — ANESTHESIA PROCEDURE NOTES
Interscalene single shot    Patient location during procedure: pre-op   Block not for primary anesthetic.  Reason for block: at surgeon's request and post-op pain management   Post-op Pain Location: left shoulder   Start time: 2/21/2025 9:04 AM  Timeout: 2/21/2025 9:03 AM   End time: 2/21/2025 9:06 AM    Staffing  Authorizing Provider: Jeff Soto MD  Performing Provider: Jeff Soto MD    Staffing  Performed by: Jeff Soto MD  Authorized by: Jeff Soto MD    Preanesthetic Checklist  Completed: patient identified, IV checked, site marked, risks and benefits discussed, surgical consent, monitors and equipment checked, pre-op evaluation and timeout performed  Peripheral Block  Patient position: sitting  Prep: ChloraPrep  Patient monitoring: heart rate, cardiac monitor, continuous pulse ox, continuous capnometry and frequent blood pressure checks  Block type: interscalene  Laterality: left  Injection technique: single shot  Needle  Needle type: Stimuplex   Needle gauge: 22 G  Needle length: 2 in  Needle localization: anatomical landmarks and ultrasound guidance   -ultrasound image captured on disc.  Assessment  Injection assessment: negative aspiration, negative parasthesia and local visualized surrounding nerve  Paresthesia pain: none  Heart rate change: no  Slow fractionated injection: yes  Pain Tolerance: comfortable throughout block and no complaints  Medications:    Medications: bupivacaine (pf) (MARCAINE) injection 0.25% - Perineural   20 mL - 2/21/2025 9:05:00 AM    Additional Notes  VSS.  [facility] RN monitoring vitals throughout procedure.  Patient tolerated procedure well.

## 2025-02-21 NOTE — PLAN OF CARE
Discharge instructions reviewed and pt verbalizes understanding. Pain control appropriate.  Dressing is clean, dry, and intact. VSS and afebrile. Meds delivered to bedside. Pt ambulatory. AVS complete.

## 2025-02-21 NOTE — TRANSFER OF CARE
"Anesthesia Transfer of Care Note    Patient: Ron Christina    Procedure(s) Performed: Procedure(s) (LRB):  REPAIR, ROTATOR CUFF, ARTHROSCOPIC- POLAR CARE (Left)  ARTHROSCOPY,SHOULDER,WITH BICEPS TENODESIS (Left)  DEBRIDEMENT, SHOULDER, ARTHROSCOPIC (Left)  ARTHROSCOPY, SHOULDER, WITH SUBACROMIAL SPACE DECOMPRESSION (Left)    Patient location: PACU    Anesthesia Type: regional and general    Transport from OR: Transported from OR on 6-10 L/min O2 by face mask with adequate spontaneous ventilation    Post pain: adequate analgesia    Post assessment: no apparent anesthetic complications and tolerated procedure well    Post vital signs: stable    Level of consciousness: sedated    Nausea/Vomiting: no nausea/vomiting    Complications: none    Transfer of care protocol was followed      Last vitals: Visit Vitals  /80 (BP Location: Right arm, Patient Position: Lying)   Pulse 90   Temp 37 °C (98.6 °F) (Temporal)   Resp 16   Ht 6' 1" (1.854 m)   Wt 102.1 kg (225 lb)   SpO2 (!) 93%   BMI 29.69 kg/m²     "

## 2025-02-21 NOTE — INTERVAL H&P NOTE
The patient has been examined and the H&P has been reviewed:    I concur with the findings and changes have been noted since the H&P was written: Cleared per PCP.    Anesthesia/Surgery risks, benefits and alternative options discussed and understood by patient/family.          There are no hospital problems to display for this patient.

## 2025-02-21 NOTE — ANESTHESIA PREPROCEDURE EVALUATION
"                                                                                                             2025  Pre-operative evaluation for Procedure(s) (LRB):  REPAIR, ROTATOR CUFF, ARTHROSCOPIC- [facility] (Left)  ARTHROSCOPY,SHOULDER,WITH BICEPS TENODESIS (Left)    Ron Christina is a 69 y.o. male     Problem List[1]    Review of patient's allergies indicates:   Allergen Reactions    Penicillins Other (See Comments)       Medications Ordered Prior to Encounter[2]    Past Surgical History:   Procedure Laterality Date    HERNIA REPAIR         Social History[3]      CBC:   Recent Labs     25  1135   WBC 6.91   RBC 4.82   HGB 16.2   HCT 47.1      MCV 98   MCH 33.6*   MCHC 34.4       CMP:   Recent Labs     25  1135      K 4.5      CO2 23   BUN 19   CREATININE 0.9   GLU 88   CALCIUM 9.7   ALBUMIN 4.0   PROT 7.6   ALKPHOS 53   ALT 24   AST 35   BILITOT 0.5       INR  No results for input(s): "PT", "INR", "PROTIME", "APTT" in the last 72 hours.        Diagnostic Studies:      EKD Echo:  No results found for this or any previous visit.       Pre-op Assessment    I have reviewed the Patient Summary Reports.     I have reviewed the Nursing Notes. I have reviewed the NPO Status.   I have reviewed the Medications.     Review of Systems      Physical Exam  General: Well nourished and Cooperative    Airway:  Mallampati: II   Mouth Opening: Normal  TM Distance: Normal  Tongue: Normal  Neck ROM: Normal ROM    Chest/Lungs:  Clear to auscultation, Normal Respiratory Rate    Heart:  Rate: Normal  Rhythm: Regular Rhythm  Sounds: Normal        Anesthesia Plan  Type of Anesthesia, risks & benefits discussed:    Anesthesia Type: Gen ETT  Intra-op Monitoring Plan: Standard ASA Monitors  Post Op Pain Control Plan: multimodal analgesia and IV/PO Opioids PRN  Induction:  IV  Airway Plan: Direct and Video, Post-Induction  Informed Consent: Informed consent signed with the Patient and all " parties understand the risks and agree with anesthesia plan.  All questions answered.   ASA Score: 2    Ready For Surgery From Anesthesia Perspective.     .           [1]   Patient Active Problem List  Diagnosis    Other hyperlipidemia    Obesity (BMI 30.0-34.9)    Hypertrophy of uvula    Elevated blood pressure reading without diagnosis of hypertension    Umbilical hernia without obstruction and without gangrene    Recurrent deep vein thrombosis (DVT)    Rotator cuff dysfunction, left    Acute pain of left shoulder   [2]   No current facility-administered medications on file prior to encounter.     No current outpatient medications on file prior to encounter.   [3]   Social History  Socioeconomic History    Marital status:    Tobacco Use    Smoking status: Never     Passive exposure: Never    Smokeless tobacco: Never   Substance and Sexual Activity    Alcohol use: Yes     Alcohol/week: 2.0 standard drinks of alcohol     Types: 1 Glasses of wine, 1 Shots of liquor per week     Comment: once a month    Drug use: No    Sexual activity: Not Currently     Social Drivers of Health     Financial Resource Strain: Low Risk  (4/22/2024)    Overall Financial Resource Strain (CARDIA)     Difficulty of Paying Living Expenses: Not hard at all   Food Insecurity: No Food Insecurity (4/22/2024)    Hunger Vital Sign     Worried About Running Out of Food in the Last Year: Never true     Ran Out of Food in the Last Year: Never true   Transportation Needs: No Transportation Needs (4/22/2024)    PRAPARE - Transportation     Lack of Transportation (Medical): No     Lack of Transportation (Non-Medical): No   Physical Activity: Insufficiently Active (4/22/2024)    Exercise Vital Sign     Days of Exercise per Week: 2 days     Minutes of Exercise per Session: 40 min   Stress: Stress Concern Present (4/22/2024)    [employer] [place] [employer] - Occupational Stress Questionnaire     Feeling of Stress : To some extent    Housing Stability: Unknown (4/22/2024)    Housing Stability Vital Sign     Unable to Pay for Housing in the Last Year: No

## 2025-02-22 NOTE — OP NOTE
[facility]   OPERATIVE REPORT   ORTHOPAEDIC SURGERY   PROVIDER: DR. CELSO PRYOR    PATIENT INFORMATION   Ron Christina 69 y.o. male 1955   MRN: [id number]   LOCATION: [facility]     DATE OF PROCEDURE: 2/21/2025     PREOPERATIVE DIAGNOSES:   Left  1. Shoulder rotator cuff tear, distal myotendinous full-thickness supraspinatus  2. Shoulder biceps tendinopathy    POSTOPERATIVE DIAGNOSES:   Left  1. Shoulder rotator cuff tear, distal myotendinous full-thickness supraspinatus  2. Shoulder biceps tendinopathy  3. Shoulder chondromalacia  4. Shoulder degenerative labral tear  5. Shoulder synovitis  6. Shoulder subacromial bursitis     OPERATION:   Left  1. Shoulder arthroscopic rotator cuff repair with Regeneten patch augmentation (CPT 89870-20)  2. Shoulder arthroscopic biceps tenodesis (CPT 59741)  3. Shoulder arthroscopic extensive debridement (CPT 49005)    4. Shoulder arthroscopic subacromial decompression     COMPLEX PROCEDURE:    There was an altered surgical field. There was abnormal anatomy.  This was a more complex than usual tear pattern with distal myotendinous level of involvement.  There was significant intrasubstance degeneration of the torn rotator cuff tendon indicating the need for biologic augmentation.  Decision was made to proceed with Regeneten patch placement after repair.  Additional time and implants were needed for successful repair of this delaminated, degenerative medialized superior cuff tear.    SURGEON: CELSO Pryor MD     ASSISTANTS:  Kian Oates MD - Fellow - First Assist  SUJATHA Rocha     First Assistant Duties: A first assistant was necessary for this procedure. Assistance was provided for surgical wound exposure, manipulation, and retractor placement and positioning. There was no qualified resident available for the procedure.       ANESTHESIA: General with interscalene block     ESTIMATED BLOOD LOSS: Minimal    IMPLANTS:    Implant Name Type Inv. Item Serial No.  Lot No. LRB No. Used Action   ANCHOR SUT FT BIOCRKSCR 6.5MM - [id number]  ANCHOR SUT FT BIOCRKSCR 6.5MM  [employer] [id number] Left 1 Implanted   ANCHOR VIGIL BC CORKSCREW 5.5MM - [id number]  ANCHOR VIGIL BC CORKSCREW 5.5MM  [employer] [id number] Left 1 Implanted   ANCHOR VIGIL BC CORKSCREW 5.5MM - [id number]  ANCHOR VIGIL BC CORKSCREW 5.5MM  [employer] [id number] Left 1 Implanted   ANCHOR SWIVELOCK KL 5.5X24.5MM - [id number]  ANCHOR SWIVELOCK KL 5.5X24.5MM  [employer] [id number] Left 1 Implanted   ANCHOR SWIVELOCK KL 5.5X24.5MM - [id number]  ANCHOR SWIVELOCK KL 5.5X24.5MM  [employer] [id number] Left 1 Implanted   IMPLANT ARTHSCP BIOINDUCTV MED - [id number]  IMPLANT ARTHSCP BIOINDUCTV MED  [facility] [employer] [id number] Left 1 Implanted   ANCHOR TENDON 8 - [id number]  ANCHOR TENDON 8  [employer] [id number] Left 1 Implanted   ANCHOR BONE ARTHSCP DEL SYS - [id number]  ANCHOR BONE ARTHSCP DEL SYS  [employer] [id number] Left 1 Implanted      SPECIMENS: None.    COMPLICATIONS: None.     INTRAOPERATIVE COUNTS: Correct.     PROPHYLACTIC IV ANTIBIOTICS: Given per [facility] Protocol.    INDICATIONS FOR PROCEDURE:   Ron Christina 69 y.o. male  has been seen and evaluated in the office for continued left shoulder pain and mechanical symptoms.  After a lengthy discussion and failed nonoperative management, the patient wished to proceed with surgical intervention and was fully informed of risks and benefits.    DETAILS OF PROCEDURE:  After the correct operative site was marked by the operating surgeon, an interscalene block was administered by the anesthesia team.  The patient was then taken to the operating room and placed supine on the operating room table, where the patient underwent general anesthesia by the anesthesia team.  The patient was then rolled into the lateral decubitus position with the operative side up.  A well-padded axillary roll, beanbag and pillows were placed. All pressure points were  carefully padded and checked. The upper extremities and both lower extremities were placed in comfortable positions and were also well-padded.      A verbal timeout was confirmed to identify the patient, operative site and planned operative procedure. It was also confirmed the patient had received preoperative IV antibiotic per protocol.     Examination under anesthesia demonstrated: Forward elevation 180 degrees, external rotation with arm to side 60 degrees.    The operative upper extremity was then prepped and draped in the usual sterile fashion.     The Spider arm positioner was implemented with balanced suspension and appropriate landmarks were noted on the skin.  A posterior followed by mary-superior portals were created and systematic examination of the joint revealed the following:      -Biceps tendinitis at the root attachment with low-grade intrasubstance tearing.  Degenerative SLAP pathology seen.  -Diffuse synovitis from anterior to posterior with circumferential degenerative labral tearing  -Thickened and inflamed capsular tissue over the anterior rotator interval extending distally through the MGHL  -A posterior push-pull maneuver with probing was performed demonstrating an intact subscapularis  -The undersurface of the superior cuff was torn with an exposed insertional footprint, anterior at the supraspinatus.  This was a medialized tear at the distal myotendinous junction.  There was intrasubstance delamination and degeneration of the torn tendon. The tear extended into the anterior infraspinatus.  -No loose bodies  -Mild superficial delamination of the glenoid articular cartilage, otherwise no glenohumeral focal chondral defects    A shaver was again brought in to clear the field of view and to debride torn labrum and undersurface cuff from anterior to posterior. Extensive synovitis was also removed. Cautery was used to resect the interval to the coracoid and to release capsule through the MGHL.  All thickened capsular tissue was released adjacent to the glenoid labrum. Care was taken to protect the axillary nerve during this portion of the procedure.  Limited chondroplasty was performed over the glenoid with the arthroscopic shaver.    The biceps was tagged with a FiberLink suture for luggage tag configuration x 2 and released with curved Montes scissors.     The torn cuff undersurface was debrided carefully with a shaver. Starting at the level of the superior glenoid and moving medially, a hand held rasp was used to complete the capsular sided releases. Care was taken to avoid injury to the suprascapular nerve. The cuff footprint was also debrided and lightly decorticated for healing response.    Attention was then turned to the subacromial space where significant hypertrophic bursa was encountered. Through an anterolateral working portal, shaver and cautery devices were introduced to clear the subacromial space of bursa and adhesions. Bursal reflections to the deltoid fascia anteriorly and posteriorly were taken down to further expand this space. This created a nice room with a view. The undersurface of the acromion was exposed with cautery to delineate bony anatomy. Systematic examination of this space revealed the following:    -Subacromial spurring with narrowing of the anterolateral subacromial interval  -Fraying and degeneration of the CA ligament indicative of chronic outlet impingement  -Full thickness cuff tearing anteriorly involving the supraspinatus with predominant crescent type pattern.  The tear was at the distal myotendinous junction with significant intrasubstance delamination and degeneration.    The tear was revisualized. Bursal sided releases were performed down to the scapular spine. The tuberosity was prepared with the shaver and power rasp through accessory posterolateral and posterior portals while looking from the standard anterolateral portal.  The tear was then repaired with  Fiberwire suture originating from two Corkscrew medial row anchors with sutures passed in horizontal mattress fashion across the tear.  The anchors were placed just lateral to the preserved lateral footprint tendon fibers.  These fibers were debrided but predominant left intact.  The medial row was then tied for a total of 4 knots pulling the medial segment of the torn tendon to the osseous footprint and bridging the gap between the 2 ends of the trans tendinous tear. These sutures along with a FiberLink suture used to pull down a posterior dog-ear were then taken laterally to two lateral row Swivelock anchors to complete the transosseous equivalent double row repair configuration.  The FiberLink suture used to tag the biceps at the beginning of the procedure also was incorporated into the anterolateral row anchor for arthroscopic biceps tenodesis.  The cuff was repaired to its native position on the greater tuberosity without excessive tension.  The 2 ends of the tendon were well apposed.  Following repair, probing of the repair site revealed good tissue apposition to the footprint and good construct stability.      Subacromial decompression was completed using posterior cutting block technique in the standard fashion with a 4.5 mm elizabeth without difficulty.  The anterior osteophyte was flattened converting the acromion morphology from a type 3 to a type 1.  Confirmation of adequate resection was confirmed while viewing from the lateral portal and referencing from the posterior acromial undersurface.    Given the patient's significant intrasubstance degeneration of the torn tendon, decision was made to augment the repair with a Regeneten patch.  The patch was introduced through an accessory posterior portal.  A total of 5 bioabsorbable staples were placed to secure the patch over the rotator cuff repair site.  Dynamic exam revealed the patch to be quite stable.    The shoulder and subacromial space were then  irrigated and fluid was extravasated using suction.     All portals were reapproximated using 3-0 Nylon. Xeroform and absorbant mepilex pads were placed to cover all incisions.  A polar care shoulder sleeve was secured followed by a sling with abduction pillow. The patient was then repositioned supine, extubated and taken to the recovery room where the patient arrived in stable condition with the compartments of the arm and forearm soft and good perfusion in all digits.     POSTOPERATIVE PLAN OF CARE:  -Patient will be discharged home according to protocol.  -Physical Therapy: Follow the < 3 cm cuff repair rehab protocol. PROM starts in 1 week.  Earlier passive range of motion in this case given the patient's Regeneten patch.  Higher risk for postoperative stiffness.  AROM starts after 6 weeks. No cuff resistive activity x 12 weeks. No biceps resistive activity x 8 weeks. Sling and pillow immobilization for 6 weeks.  -DVT prophylaxis:  Restart Eliquis 24 hours postop.

## 2025-02-24 ENCOUNTER — CLINICAL SUPPORT (OUTPATIENT)
Dept: REHABILITATION | Facility: HOSPITAL | Age: 70
End: 2025-02-24
Payer: MEDICARE

## 2025-02-24 DIAGNOSIS — M25.612 DECREASED RANGE OF MOTION OF LEFT SHOULDER: Primary | ICD-10-CM

## 2025-02-24 DIAGNOSIS — S46.012A TRAUMATIC COMPLETE TEAR OF LEFT ROTATOR CUFF, INITIAL ENCOUNTER: ICD-10-CM

## 2025-02-24 DIAGNOSIS — R29.898 DECREASED STRENGTH OF UPPER EXTREMITY: ICD-10-CM

## 2025-02-24 PROCEDURE — 97110 THERAPEUTIC EXERCISES: CPT | Mod: HCNC

## 2025-02-24 PROCEDURE — 97162 PT EVAL MOD COMPLEX 30 MIN: CPT | Mod: HCNC

## 2025-02-26 ENCOUNTER — CLINICAL SUPPORT (OUTPATIENT)
Dept: REHABILITATION | Facility: HOSPITAL | Age: 70
End: 2025-02-26
Payer: MEDICARE

## 2025-02-26 DIAGNOSIS — M25.612 DECREASED RANGE OF MOTION OF LEFT SHOULDER: Primary | ICD-10-CM

## 2025-02-26 DIAGNOSIS — R29.898 DECREASED STRENGTH OF UPPER EXTREMITY: ICD-10-CM

## 2025-02-26 PROCEDURE — 97110 THERAPEUTIC EXERCISES: CPT | Mod: HCNC

## 2025-02-26 PROCEDURE — 97140 MANUAL THERAPY 1/> REGIONS: CPT | Mod: HCNC

## 2025-02-26 NOTE — PROGRESS NOTES
Outpatient Rehab    Physical Therapy Visit    Patient Name: Ron Christina  MRN: [id number]  YOB: 1955  Encounter Date: 2/26/2025    Therapy Diagnosis:   Encounter Diagnoses   Name Primary?    Decreased range of motion of left shoulder Yes    Decreased strength of upper extremity      Physician: Alma Hathaway*    Physician Orders: Eval and Treat    Medical Diagnosis: Traumatic complete tear of left rotator cuff, initial encounter [S46.012A]      Visit # / Visits Authorized:  1 / 20   Date of Evaluation:  2/24/2025   Insurance Authorization Period: 2/11/2025 to 2/11/2026  Plan of Care Certification:  2/24/2025 to 5/19/2025      Time In:   11:00am  Time Out:  12:00pm  Total Time:   60  Total Billable Time: 30    FOTO:  Intake Score:  %  Survey Score 1:  %  Survey Score 2:  %         Subjective         shoulder is feeling good, no pain    Objective        Shoulder Passive Range of Motion:   Shoulder Right Left   Flexion    170 50   ER at 0    80 10   ER at 90    Not tested 2/2 post-op Not tested 2/2 post-op   IR at 90    Not tested 2/2 post-op Not tested 2/2 post-op         Treatment         Ron received the treatments listed below:      therapeutic exercises to develop strength, endurance, ROM, flexibility, posture, and core stabilization for 30 minutes including:  Pendelum hangs 1 min 3x  Wrist [id number]  Elbow arom 20x(pro, elke, sup)    manual therapy techniques: Joint mobilizations, Manual traction, Myofacial release, Manual Lymphatic Drainage, Soft tissue Mobilization, and Friction Massage were applied to the: shoulder for 15 minutes, including:  Grade I/II post/inferior mobs  Gentle oscillations  reassessment    neuromuscular re-education activities to improve: Balance, Coordination, Kinesthetic, Sense, Proprioception, and Posture for 15 minutes. The following activities were included:  Scap squeezes 5 sec holds 30x  Shoulder shrugs 5 sec holds 30x    therapeutic activities to improve  functional performance for 0  minutes, including:       Assessment & Plan   Assessment:     Pt completed today's treatment session as listed above. Continues to do well with minimal pain, good compliance with HEP and sling, and no constitutional signs. Required min verbal cues during pendulum hangs. Plan to start PROM/table slides next visit.    Patient will continue to benefit from skilled outpatient physical therapy to address the deficits listed in the problem list box on initial evaluation, provide pt/family education and to maximize pt's level of independence in the home and community environment.     Patient's spiritual, cultural, and educational needs considered and patient agreeable to plan of care and goals.           Plan:  Continue POC    Goals:   Active       Long Term Goals       Pt will improve FOTO score to </=see FOTO% limited to decrease perceived limitation with carrying, moving, and handling objects.       Start:  02/24/25    Expected End:  08/11/25            Pt will increase  shoulder AROM to WFL in all planes to improve functional use of R RUE.       Start:  02/24/25    Expected End:  08/11/25            Pt will improve  shoulder MMTs to = 4+/5 to promote equal use of B UEs in performing functional tasks.       Start:  02/24/25    Expected End:  08/11/25            Pt will lift 10 lb objects without pain to promote functional QOL.       Start:  02/24/25    Expected End:  08/11/25            Pt to report pain </= 0/10       Start:  02/24/25    Expected End:  08/11/25               Short Term Goals        Pt will be independent with HEP to supplement PT in improving functional use of L UE.       Start:  02/24/25    Expected End:  03/24/25            Pt will increase pain free shoulder elevation PROM to >/= 160 deg to improve functional mobility of UE       Start:  02/24/25    Expected End:  03/24/25            Pt will increase shoulder ER PROM in 90 deg abduction to >/=20 deg to improve  functional mobility of UE       Start:  02/24/25    Expected End:  03/24/25            Pt will increase elbow AAROM to WNL deg in 4 weeks to improve functional mobility of UE.       Start:  02/24/25    Expected End:  03/24/25                Delfion Quan, PT

## 2025-03-02 ENCOUNTER — PATIENT MESSAGE (OUTPATIENT)
Dept: ADMINISTRATIVE | Facility: HOSPITAL | Age: 70
End: 2025-03-02
Payer: MEDICARE

## 2025-03-03 ENCOUNTER — CLINICAL SUPPORT (OUTPATIENT)
Dept: REHABILITATION | Facility: HOSPITAL | Age: 70
End: 2025-03-03
Payer: MEDICARE

## 2025-03-03 DIAGNOSIS — Z12.11 SCREENING FOR COLON CANCER: ICD-10-CM

## 2025-03-03 DIAGNOSIS — S46.012A TRAUMATIC COMPLETE TEAR OF LEFT ROTATOR CUFF, INITIAL ENCOUNTER: ICD-10-CM

## 2025-03-03 DIAGNOSIS — M25.612 DECREASED RANGE OF MOTION OF LEFT SHOULDER: Primary | ICD-10-CM

## 2025-03-03 DIAGNOSIS — R29.898 DECREASED STRENGTH OF UPPER EXTREMITY: ICD-10-CM

## 2025-03-03 PROCEDURE — 97112 NEUROMUSCULAR REEDUCATION: CPT | Mod: HCNC

## 2025-03-03 PROCEDURE — 97110 THERAPEUTIC EXERCISES: CPT | Mod: HCNC

## 2025-03-03 PROCEDURE — 97140 MANUAL THERAPY 1/> REGIONS: CPT | Mod: HCNC

## 2025-03-03 RX ORDER — OXYCODONE HYDROCHLORIDE 5 MG/1
5-10 TABLET ORAL
Qty: 25 TABLET | Refills: 0 | Status: SHIPPED | OUTPATIENT
Start: 2025-03-03

## 2025-03-03 NOTE — PROGRESS NOTES
Outpatient Rehab    Physical Therapy Visit    Patient Name: Ron Christina  MRN: [id number]  YOB: 1955  Encounter Date: 3/3/2025    Therapy Diagnosis:   Encounter Diagnoses   Name Primary?    Decreased range of motion of left shoulder Yes    Decreased strength of upper extremity      Physician: Alma Hathaway*    Physician Orders: Eval and Treat    Medical Diagnosis: Traumatic complete tear of left rotator cuff, initial encounter [S46.012A]      Visit # / Visits Authorized:  1 / 20   Date of Evaluation:  2/24/2025   Insurance Authorization Period: 2/11/2025 to 2/11/2026  Plan of Care Certification:  2/24/2025 to 5/19/2025      Time In:   10:00am  Time Out:  11:00am  Total Time:   60  Total Billable Time: 60    FOTO:  Intake Score:  %  Survey Score 1:  %  Survey Score 2:  %         Subjective         shoulder is feeling good, no pain. Exercises going well    Objective        Shoulder Passive Range of Motion: 3/3/2025  Shoulder Right Left   Flexion    170 90 free and easy   ER at 0    80 Pre-15  Post-30 free and easy   ER at 90    Not tested 2/2 post-op Not tested 2/2 post-op   IR at 90    Not tested 2/2 post-op Not tested 2/2 post-op         Treatment         Ron received the treatments listed below:      therapeutic exercises to develop strength, endurance, ROM, flexibility, posture, and core stabilization for 30 minutes including:  Pendelum hangs 1 min 3x  Wrist [id number]  Elbow arom 20x(pro, elke, sup)  Supine dowel AAROM ER 30x  manual therapy techniques: Joint mobilizations, Manual traction, Myofacial release, Manual Lymphatic Drainage, Soft tissue Mobilization, and Friction Massage were applied to the: shoulder for 15 minutes, including:  Grade I/II post/inferior mobs  Gentle oscillations  reassessment    neuromuscular re-education activities to improve: Balance, Coordination, Kinesthetic, Sense, Proprioception, and Posture for 15 minutes. The following activities were included:  Scap  squeezes 5 sec holds 30x  Shoulder shrugs 5 sec holds 30x  Table slides 5 min  therapeutic activities to improve functional performance for 0  minutes, including:       Assessment & Plan   Assessment:     Pt completed today's treatment session as listed above. Continues to do well at this stage of rehab. Achieved staged rom goals post session. Added table slides and supine dowel assist er with good tolerance. Added table slides today with good tolerance. Will continue to progress.    Patient will continue to benefit from skilled outpatient physical therapy to address the deficits listed in the problem list box on initial evaluation, provide pt/family education and to maximize pt's level of independence in the home and community environment.     Patient's spiritual, cultural, and educational needs considered and patient agreeable to plan of care and goals.           Plan:  Continue POC    Goals:   Active       Long Term Goals       Pt will improve FOTO score to </=see FOTO% limited to decrease perceived limitation with carrying, moving, and handling objects.       Start:  02/24/25    Expected End:  08/11/25            Pt will increase  shoulder AROM to WFL in all planes to improve functional use of R RUE.       Start:  02/24/25    Expected End:  08/11/25            Pt will improve  shoulder MMTs to = 4+/5 to promote equal use of B UEs in performing functional tasks.       Start:  02/24/25    Expected End:  08/11/25            Pt will lift 10 lb objects without pain to promote functional QOL.       Start:  02/24/25    Expected End:  08/11/25            Pt to report pain </= 0/10       Start:  02/24/25    Expected End:  08/11/25               Short Term Goals        Pt will be independent with HEP to supplement PT in improving functional use of L UE.       Start:  02/24/25    Expected End:  03/24/25            Pt will increase pain free shoulder elevation PROM to >/= 160 deg to improve functional mobility of UE        Start:  02/24/25    Expected End:  03/24/25            Pt will increase shoulder ER PROM in 90 deg abduction to >/=20 deg to improve functional mobility of UE       Start:  02/24/25    Expected End:  03/24/25            Pt will increase elbow AAROM to WNL deg in 4 weeks to improve functional mobility of UE.       Start:  02/24/25    Expected End:  03/24/25                Delfino Quan, PT

## 2025-03-05 ENCOUNTER — CLINICAL SUPPORT (OUTPATIENT)
Dept: REHABILITATION | Facility: HOSPITAL | Age: 70
End: 2025-03-05
Payer: MEDICARE

## 2025-03-05 DIAGNOSIS — R29.898 DECREASED STRENGTH OF UPPER EXTREMITY: ICD-10-CM

## 2025-03-05 DIAGNOSIS — M25.612 DECREASED RANGE OF MOTION OF LEFT SHOULDER: Primary | ICD-10-CM

## 2025-03-05 PROCEDURE — 97110 THERAPEUTIC EXERCISES: CPT | Mod: HCNC

## 2025-03-05 PROCEDURE — 97112 NEUROMUSCULAR REEDUCATION: CPT | Mod: HCNC

## 2025-03-05 NOTE — PROGRESS NOTES
Outpatient Rehab    Physical Therapy Visit    Patient Name: Ron Christina  MRN: [id number]  YOB: 1955  Encounter Date: 3/5/2025    Therapy Diagnosis:   Encounter Diagnoses   Name Primary?    Decreased range of motion of left shoulder Yes    Decreased strength of upper extremity      Physician: Alma Hathaway*    Physician Orders: Eval and Treat    Medical Diagnosis: Traumatic complete tear of left rotator cuff, initial encounter [S46.012A]      Visit # / Visits Authorized:  1 / 20   Date of Evaluation:  2/24/2025   Insurance Authorization Period: 2/11/2025 to 2/11/2026  Plan of Care Certification:  2/24/2025 to 5/19/2025      Time In:   10:00am  Time Out:  11:00am  Total Time:   60  Total Billable Time: 60    FOTO:  Intake Score:  %  Survey Score 1:  %  Survey Score 2:  %         Subjective         shoulder is feeling good, no pain. Exercises going well    Objective        Shoulder Passive Range of Motion: 3/3/2025  Shoulder Right Left   Flexion    170 90    ER at 0    80 Pre-25  Post-35   ER at 90    Not tested 2/2 post-op Not tested 2/2 post-op   IR at 90    Not tested 2/2 post-op Not tested 2/2 post-op         Treatment         Ron received the treatments listed below:      therapeutic exercises to develop strength, endurance, ROM, flexibility, posture, and core stabilization for 25 minutes including:  Pendelum hangs 1 min 3x  Wrist [id number]  Elbow arom 20x(pro, elke, sup)  Supine dowel AAROM ER 30x  manual therapy techniques: Joint mobilizations, Manual traction, Myofacial release, Manual Lymphatic Drainage, Soft tissue Mobilization, and Friction Massage were applied to the: shoulder for 15 minutes, including:  Grade I/II post/inferior mobs  Gentle oscillations  reassessment    neuromuscular re-education activities to improve: Balance, Coordination, Kinesthetic, Sense, Proprioception, and Posture for 20 minutes. The following activities were included:  Scap squeezes 5 sec holds  30x  Shoulder shrugs 5 sec holds 30x  Table slides(flexion, ER bias, and scaption) 5 min each  therapeutic activities to improve functional performance for 0  minutes, including:       Assessment & Plan   Assessment:     Pt completed today's treatment session as listed above. Continues to do well at this stage of rehab. Achieved staged rom goals post session. Progressed table slide variations with good tolerance. Added table slides today with good tolerance. Updated patient's HEP. Required min verbal cues for scapular retractions. Will continue to progress.    Patient will continue to benefit from skilled outpatient physical therapy to address the deficits listed in the problem list box on initial evaluation, provide pt/family education and to maximize pt's level of independence in the home and community environment.     Patient's spiritual, cultural, and educational needs considered and patient agreeable to plan of care and goals.           Plan:  Continue POC    Goals:   Active       Long Term Goals       Pt will improve FOTO score to </=see FOTO% limited to decrease perceived limitation with carrying, moving, and handling objects.       Start:  02/24/25    Expected End:  08/11/25            Pt will increase  shoulder AROM to WFL in all planes to improve functional use of R RUE.       Start:  02/24/25    Expected End:  08/11/25            Pt will improve  shoulder MMTs to = 4+/5 to promote equal use of B UEs in performing functional tasks.       Start:  02/24/25    Expected End:  08/11/25            Pt will lift 10 lb objects without pain to promote functional QOL.       Start:  02/24/25    Expected End:  08/11/25            Pt to report pain </= 0/10       Start:  02/24/25    Expected End:  08/11/25               Short Term Goals        Pt will be independent with HEP to supplement PT in improving functional use of L UE.       Start:  02/24/25    Expected End:  03/24/25            Pt will increase pain free  shoulder elevation PROM to >/= 160 deg to improve functional mobility of UE       Start:  02/24/25    Expected End:  03/24/25            Pt will increase shoulder ER PROM in 90 deg abduction to >/=20 deg to improve functional mobility of UE       Start:  02/24/25    Expected End:  03/24/25            Pt will increase elbow AAROM to WNL deg in 4 weeks to improve functional mobility of UE.       Start:  02/24/25    Expected End:  03/24/25                Delfino Quan, PT

## 2025-03-06 ENCOUNTER — OFFICE VISIT (OUTPATIENT)
Dept: SPORTS MEDICINE | Facility: CLINIC | Age: 70
End: 2025-03-06
Payer: MEDICARE

## 2025-03-06 VITALS
HEIGHT: 73 IN | BODY MASS INDEX: 30.07 KG/M2 | DIASTOLIC BLOOD PRESSURE: 76 MMHG | SYSTOLIC BLOOD PRESSURE: 125 MMHG | WEIGHT: 226.88 LBS | HEART RATE: 88 BPM

## 2025-03-06 DIAGNOSIS — Z98.890 S/P LEFT ROTATOR CUFF REPAIR: Primary | ICD-10-CM

## 2025-03-06 PROCEDURE — 99999 PR PBB SHADOW E&M-EST. PATIENT-LVL III: CPT | Mod: PBBFAC,HCNC,, | Performed by: PHYSICIAN ASSISTANT

## 2025-03-06 NOTE — PROGRESS NOTES
S:Ron Christina presents for post-operative evaluation.     DATE OF PROCEDURE: 2/21/2025      OPERATION:   Left  1. Shoulder arthroscopic rotator cuff repair with Regeneten patch augmentation (CPT 52990-72)  2. Shoulder arthroscopic biceps tenodesis (CPT 80727)  3. Shoulder arthroscopic extensive debridement (CPT 45086)    4. Shoulder arthroscopic subacromial decompression     Ron Christina reports to be doing well 2wk s/p the above mentioned procedure. Denies fevers, chills, night sweats, chest pain, difficulty breathing, calf pain or tenderness. Going to PT 2xWeek at the [facility]. Seeing good progress daily. Pain levels are improving. Has d/c'd pain medication.     O: The incisions are healing well.  No signs of infection.  Sutures were removed. No significant pain or unusual tenderness.    A/P: Doing great. Incisions healing well. Ok to shower with incisions uncovered. No submerging at this point. Continue sling for another month, no active ROM until then. Ok for passive now. Plan to follow the rehab plan as previously outlined. RTC in 4 weeks.     POSTOPERATIVE PLAN OF CARE:  -Patient will be discharged home according to protocol.  -Physical Therapy: Follow the < 3 cm cuff repair rehab protocol. PROM starts in 1 week.  Earlier passive range of motion in this case given the patient's Regeneten patch.  Higher risk for postoperative stiffness.  AROM starts after 6 weeks. No cuff resistive activity x 12 weeks. No biceps resistive activity x 8 weeks. Sling and pillow immobilization for 6 weeks.  -DVT prophylaxis:  Restart Eliquis 24 hours postop.

## 2025-03-09 ENCOUNTER — PATIENT MESSAGE (OUTPATIENT)
Dept: INTERNAL MEDICINE | Facility: CLINIC | Age: 70
End: 2025-03-09
Payer: MEDICARE

## 2025-03-11 ENCOUNTER — CLINICAL SUPPORT (OUTPATIENT)
Dept: REHABILITATION | Facility: HOSPITAL | Age: 70
End: 2025-03-11
Payer: MEDICARE

## 2025-03-11 DIAGNOSIS — R29.898 DECREASED STRENGTH OF UPPER EXTREMITY: ICD-10-CM

## 2025-03-11 DIAGNOSIS — M25.612 DECREASED RANGE OF MOTION OF LEFT SHOULDER: Primary | ICD-10-CM

## 2025-03-11 PROCEDURE — 97112 NEUROMUSCULAR REEDUCATION: CPT | Mod: HCNC

## 2025-03-11 PROCEDURE — 97110 THERAPEUTIC EXERCISES: CPT | Mod: HCNC

## 2025-03-11 NOTE — PROGRESS NOTES
Outpatient Rehab    Physical Therapy Visit    Patient Name: Ron Christina  MRN: [id number]  YOB: 1955  Encounter Date: 3/11/2025    Therapy Diagnosis:   Encounter Diagnoses   Name Primary?    Decreased range of motion of left shoulder Yes    Decreased strength of upper extremity        Physician: Alma Hathaway*    Physician Orders: Eval and Treat    Medical Diagnosis: Traumatic complete tear of left rotator cuff, initial encounter [S46.012A]      Visit # / Visits Authorized:  1 / 20   Date of Evaluation:  2/24/2025   Insurance Authorization Period: 2/11/2025 to 2/11/2026  Plan of Care Certification:  2/24/2025 to 5/19/2025      Time In:   1:00pm  Time Out:  2:00pm  Total Time:   60  Total Billable Time: 60    FOTO:  Intake Score:  %  Survey Score 1:  %  Survey Score 2:  %         Subjective         shoulder is feeling good, no pain. Exercises going well    Objective        Shoulder Passive Range of Motion: 3/3/2025  Shoulder Right Left   Flexion    170 90    ER at 0    80 Pre-25  Post-30 free and easy   ER at 90    Not tested 2/2 post-op Not tested 2/2 post-op   IR at 90    Not tested 2/2 post-op Not tested 2/2 post-op         Treatment         Ron received the treatments listed below:      therapeutic exercises to develop strength, endurance, ROM, flexibility, posture, and core stabilization for 25 minutes including:  Pendelum hangs 1 min 3x  Wrist [id number]  Elbow arom 20x(pro, elke, sup)  Supine dowel AAROM ER 30x  manual therapy techniques: Joint mobilizations, Manual traction, Myofacial release, Manual Lymphatic Drainage, Soft tissue Mobilization, and Friction Massage were applied to the: shoulder for 15 minutes, including:  Grade I/II post/inferior mobs  Gentle oscillations  reassessment    neuromuscular re-education activities to improve: Balance, Coordination, Kinesthetic, Sense, Proprioception, and Posture for 20 minutes. The following activities were included:  Scap squeezes 5 sec  holds 30x  Shoulder shrugs 5 sec holds 30x  Table slides(flexion, ER bias, and scaption) 5 min each  therapeutic activities to improve functional performance for 0  minutes, including:       Assessment & Plan   Assessment:     Pt completed today's treatment session as listed above. Continues to do well at this stage of rehab. Achieved staged rom goals post session. Progressed table slide variations with good tolerance. Added table slides today with good tolerance. Updated patient's HEP. Required min verbal cues for scapular retractions. Will continue to progress.    Patient will continue to benefit from skilled outpatient physical therapy to address the deficits listed in the problem list box on initial evaluation, provide pt/family education and to maximize pt's level of independence in the home and community environment.     Patient's spiritual, cultural, and educational needs considered and patient agreeable to plan of care and goals.           Plan:  Continue POC    Goals:   Active       Long Term Goals       Pt will improve FOTO score to </=see FOTO% limited to decrease perceived limitation with carrying, moving, and handling objects.       Start:  02/24/25    Expected End:  08/11/25            Pt will increase  shoulder AROM to WFL in all planes to improve functional use of R RUE.       Start:  02/24/25    Expected End:  08/11/25            Pt will improve  shoulder MMTs to = 4+/5 to promote equal use of B UEs in performing functional tasks.       Start:  02/24/25    Expected End:  08/11/25            Pt will lift 10 lb objects without pain to promote functional QOL.       Start:  02/24/25    Expected End:  08/11/25            Pt to report pain </= 0/10       Start:  02/24/25    Expected End:  08/11/25               Short Term Goals        Pt will be independent with HEP to supplement PT in improving functional use of L UE.       Start:  02/24/25    Expected End:  03/24/25            Pt will increase pain free  shoulder elevation PROM to >/= 160 deg to improve functional mobility of UE       Start:  02/24/25    Expected End:  03/24/25            Pt will increase shoulder ER PROM in 90 deg abduction to >/=20 deg to improve functional mobility of UE       Start:  02/24/25    Expected End:  03/24/25            Pt will increase elbow AAROM to WNL deg in 4 weeks to improve functional mobility of UE.       Start:  02/24/25    Expected End:  03/24/25                Delfino Quan, PT

## 2025-03-13 ENCOUNTER — CLINICAL SUPPORT (OUTPATIENT)
Dept: REHABILITATION | Facility: HOSPITAL | Age: 70
End: 2025-03-13
Payer: MEDICARE

## 2025-03-13 DIAGNOSIS — M25.612 DECREASED RANGE OF MOTION OF LEFT SHOULDER: Primary | ICD-10-CM

## 2025-03-13 DIAGNOSIS — R29.898 DECREASED STRENGTH OF UPPER EXTREMITY: ICD-10-CM

## 2025-03-13 PROCEDURE — 97112 NEUROMUSCULAR REEDUCATION: CPT | Mod: HCNC

## 2025-03-13 PROCEDURE — 97110 THERAPEUTIC EXERCISES: CPT | Mod: HCNC

## 2025-03-13 PROCEDURE — 97140 MANUAL THERAPY 1/> REGIONS: CPT | Mod: HCNC

## 2025-03-17 ENCOUNTER — CLINICAL SUPPORT (OUTPATIENT)
Dept: REHABILITATION | Facility: HOSPITAL | Age: 70
End: 2025-03-17
Payer: MEDICARE

## 2025-03-17 DIAGNOSIS — M25.612 DECREASED RANGE OF MOTION OF LEFT SHOULDER: Primary | ICD-10-CM

## 2025-03-17 DIAGNOSIS — R29.898 DECREASED STRENGTH OF UPPER EXTREMITY: ICD-10-CM

## 2025-03-17 PROCEDURE — 97110 THERAPEUTIC EXERCISES: CPT | Mod: HCNC

## 2025-03-17 PROCEDURE — 97112 NEUROMUSCULAR REEDUCATION: CPT | Mod: HCNC

## 2025-03-17 NOTE — PROGRESS NOTES
Outpatient Rehab    Physical Therapy Visit    Patient Name: Ron Christina  MRN: [id number]  YOB: 1955  Encounter Date: 3/17/2025    Therapy Diagnosis:   Encounter Diagnoses   Name Primary?    Decreased range of motion of left shoulder Yes    Decreased strength of upper extremity          Physician: Alma Hathaway*    Physician Orders: Eval and Treat    Medical Diagnosis: Traumatic complete tear of left rotator cuff, initial encounter [S46.012A]      Visit # / Visits Authorized:  6 / 20   Date of Evaluation:  2/24/2025   Insurance Authorization Period: 2/11/2025 to 2/11/2026  Plan of Care Certification:  2/24/2025 to 5/19/2025      Time In:   10:00am  Time Out:  11:00am  Total Time:   60  Total Billable Time: 30    FOTO:  Intake Score:  %  Survey Score 1:  %  Survey Score 2:  %         Subjective         Pt reports shoulder is feeling good and has been following protocol with no pain. Exercises going well at home.     Objective    S/p L RCR 3/17/2025. Pt is 3wks, 3 days out      Shoulder Passive Range of Motion: 3/17/2025  Shoulder Right Left   Flexion    170 90    ER at 0    80 30   ER at 90    Not tested 2/2 post-op Not tested 2/2 post-op   IR at 90    Not tested 2/2 post-op Not tested 2/2 post-op         Treatment         Ron received the treatments listed below:      therapeutic exercises to develop strength, endurance, ROM, flexibility, posture, and core stabilization for 15 minutes including:  Supine dowel AAROM ER 30x  Supine dowel AAROM chest press 3x10    NT:  Wrist [id number]  Elbow arom 20x(pro, elke, sup)  Pendelum hangs 1 min 3x  manual therapy techniques: Joint mobilizations, Manual traction, Myofacial release, Manual Lymphatic Drainage, Soft tissue Mobilization, and Friction Massage were applied to the: shoulder for 15 minutes, including:  Grade I/II post/inferior mobs  Gentle oscillations  reassessment    neuromuscular re-education activities to improve: Balance, Coordination,  Kinesthetic, Sense, Proprioception, and Posture for 30 minutes. The following activities were included:  Scap squeezes 5min  Shoulder shrugs 5 sec holds 30x  Table slides(flexion, ER bias, and scaption) 5 min each  therapeutic activities to improve functional performance for 0  minutes, including:       Assessment & Plan   Assessment:     Pt presents with good continued progress. Achieved staged ROM goals upon arrival. Required verbal and tactile cue to decrease UT recruitment in scap squeeze, which improved. PT initiated AAROM chest press, which pt tolerated well.  Good compliance with patient's HEP. Will continue to progress per protocol.      Patient will continue to benefit from skilled outpatient physical therapy to address the deficits listed in the problem list box on initial evaluation, provide pt/family education and to maximize pt's level of independence in the home and community environment.     Patient's spiritual, cultural, and educational needs considered and patient agreeable to plan of care and goals.           Plan:  Continue POC    Goals:   Active       Long Term Goals       Pt will improve FOTO score to </=see FOTO% limited to decrease perceived limitation with carrying, moving, and handling objects.       Start:  02/24/25    Expected End:  08/11/25            Pt will increase  shoulder AROM to WFL in all planes to improve functional use of R RUE.       Start:  02/24/25    Expected End:  08/11/25            Pt will improve  shoulder MMTs to = 4+/5 to promote equal use of B UEs in performing functional tasks.       Start:  02/24/25    Expected End:  08/11/25            Pt will lift 10 lb objects without pain to promote functional QOL.       Start:  02/24/25    Expected End:  08/11/25            Pt to report pain </= 0/10       Start:  02/24/25    Expected End:  08/11/25               Short Term Goals        Pt will be independent with HEP to supplement PT in improving functional use of L UE.        Start:  02/24/25    Expected End:  03/24/25            Pt will increase pain free shoulder elevation PROM to >/= 160 deg to improve functional mobility of UE       Start:  02/24/25    Expected End:  03/24/25            Pt will increase shoulder ER PROM in 90 deg abduction to >/=20 deg to improve functional mobility of UE       Start:  02/24/25    Expected End:  03/24/25            Pt will increase elbow AAROM to WNL deg in 4 weeks to improve functional mobility of UE.       Start:  02/24/25    Expected End:  03/24/25                Delfino Quan, PT DPT

## 2025-03-17 NOTE — PROGRESS NOTES
Outpatient Rehab    Physical Therapy Visit    Patient Name: Ron Christina  MRN: [id number]  YOB: 1955  Encounter Date: 3/17/2025    Therapy Diagnosis:   Encounter Diagnoses   Name Primary?    Decreased range of motion of left shoulder Yes    Decreased strength of upper extremity          Physician: Alma Hathaway*    Physician Orders: Eval and Treat    Medical Diagnosis: Traumatic complete tear of left rotator cuff, initial encounter [S46.012A]      Visit # / Visits Authorized:  6 / 20   Date of Evaluation:  2/24/2025   Insurance Authorization Period: 2/11/2025 to 2/11/2026  Plan of Care Certification:  2/24/2025 to 5/19/2025      Time In:   10:05am  Time Out:  10:55am  Total Time:   50  Total Billable Time: 50    FOTO:  Intake Score:  %  Survey Score 1:  %  Survey Score 2:  %         Subjective         Pt reports shoulder is feeling good and has been following protocol with no pain. Exercises going well at home.     Objective    S/p L RCR 3/17/2025. Pt is 3wks, 3 days out      Shoulder Passive Range of Motion: 3/17/2025  Shoulder Right Left   Flexion    170 90    ER at 0    80 30   ER at 90    Not tested 2/2 post-op Not tested 2/2 post-op   IR at 90    Not tested 2/2 post-op Not tested 2/2 post-op         Treatment         Ron received the treatments listed below:      therapeutic exercises to develop strength, endurance, ROM, flexibility, posture, and core stabilization for 15 minutes including:  Supine dowel AAROM ER 30x  Supine dowel AAROM chest press 3x10    NT:  Wrist [id number]  Elbow arom 20x(pro, elke, sup)  Pendelum hangs 1 min 3x  manual therapy techniques: Joint mobilizations, Manual traction, Myofacial release, Manual Lymphatic Drainage, Soft tissue Mobilization, and Friction Massage were applied to the: shoulder for 15 minutes, including:  Grade I/II post/inferior mobs  Gentle oscillations  reassessment    neuromuscular re-education activities to improve: Balance, Coordination,  Kinesthetic, Sense, Proprioception, and Posture for 20 minutes. The following activities were included:  Scap squeezes 5min  Shoulder shrugs 5 sec holds 30x  Table slides(flexion, ER bias, and scaption) 5 min each  therapeutic activities to improve functional performance for 0  minutes, including:       Assessment & Plan   Assessment:     Pt presents with good continued progress. Achieved staged ROM goals upon arrival. Required verbal and tactile cue to decrease UT recruitment in scap squeeze, which improved. PT initiated AAROM chest press, which pt tolerated well.  Good compliance with patient's HEP. Will continue to progress per protocol.      Patient will continue to benefit from skilled outpatient physical therapy to address the deficits listed in the problem list box on initial evaluation, provide pt/family education and to maximize pt's level of independence in the home and community environment.     Patient's spiritual, cultural, and educational needs considered and patient agreeable to plan of care and goals.           Plan:  Continue POC    Goals:   Active       Long Term Goals       Pt will improve FOTO score to </=see FOTO% limited to decrease perceived limitation with carrying, moving, and handling objects.       Start:  02/24/25    Expected End:  08/11/25            Pt will increase  shoulder AROM to WFL in all planes to improve functional use of R RUE.       Start:  02/24/25    Expected End:  08/11/25            Pt will improve  shoulder MMTs to = 4+/5 to promote equal use of B UEs in performing functional tasks.       Start:  02/24/25    Expected End:  08/11/25            Pt will lift 10 lb objects without pain to promote functional QOL.       Start:  02/24/25    Expected End:  08/11/25            Pt to report pain </= 0/10       Start:  02/24/25    Expected End:  08/11/25               Short Term Goals        Pt will be independent with HEP to supplement PT in improving functional use of L UE.        Start:  02/24/25    Expected End:  03/24/25            Pt will increase pain free shoulder elevation PROM to >/= 160 deg to improve functional mobility of UE       Start:  02/24/25    Expected End:  03/24/25            Pt will increase shoulder ER PROM in 90 deg abduction to >/=20 deg to improve functional mobility of UE       Start:  02/24/25    Expected End:  03/24/25            Pt will increase elbow AAROM to WNL deg in 4 weeks to improve functional mobility of UE.       Start:  02/24/25    Expected End:  03/24/25                Delfino Quan, PT DPT

## 2025-03-19 ENCOUNTER — CLINICAL SUPPORT (OUTPATIENT)
Dept: REHABILITATION | Facility: HOSPITAL | Age: 70
End: 2025-03-19
Payer: MEDICARE

## 2025-03-19 DIAGNOSIS — M25.612 DECREASED RANGE OF MOTION OF LEFT SHOULDER: Primary | ICD-10-CM

## 2025-03-19 DIAGNOSIS — R29.898 DECREASED STRENGTH OF UPPER EXTREMITY: ICD-10-CM

## 2025-03-19 PROCEDURE — 97112 NEUROMUSCULAR REEDUCATION: CPT | Mod: HCNC

## 2025-03-19 PROCEDURE — 97140 MANUAL THERAPY 1/> REGIONS: CPT | Mod: HCNC

## 2025-03-19 PROCEDURE — 97110 THERAPEUTIC EXERCISES: CPT | Mod: HCNC

## 2025-03-19 NOTE — PROGRESS NOTES
Outpatient Rehab    Physical Therapy Visit    Patient Name: Ron Christina  MRN: [id number]  YOB: 1955  Encounter Date: 3/17/2025    Therapy Diagnosis:   Encounter Diagnoses   Name Primary?    Decreased range of motion of left shoulder Yes    Decreased strength of upper extremity          Physician: Alma Hathaway*    Physician Orders: Eval and Treat    Medical Diagnosis: Traumatic complete tear of left rotator cuff, initial encounter [S46.012A]      Visit # / Visits Authorized:  6 / 20   Date of Evaluation:  2/24/2025   Insurance Authorization Period: 2/11/2025 to 2/11/2026  Plan of Care Certification:  2/24/2025 to 5/19/2025      Time In:   10:00am  Time Out:  11:00am  Total Time:   60  Total Billable Time: 60    FOTO:  Intake Score:  %  Survey Score 1:  %  Survey Score 2:  %         Subjective         Pt reports shoulder is feeling good and has been following protocol with no pain. Exercises going well at home.     Objective    S/p L RCR 3/17/2025. Pt is 3wks, 3 days out      Shoulder Passive Range of Motion: 3/17/2025  Shoulder Right Left   Flexion    170 90    ER at 0    80 30   ER at 90    Not tested 2/2 post-op Not tested 2/2 post-op   IR at 90    Not tested 2/2 post-op Not tested 2/2 post-op         Treatment         Ron received the treatments listed below:      therapeutic exercises to develop strength, endurance, ROM, flexibility, posture, and core stabilization for 15 minutes including:  Supine dowel AAROM ER 30x  Supine dowel AAROM chest press 3x10    NT:  Wrist [id number]  Elbow arom 20x(pro, elke, sup)  Pendelum hangs 1 min 3x  manual therapy techniques: Joint mobilizations, Manual traction, Myofacial release, Manual Lymphatic Drainage, Soft tissue Mobilization, and Friction Massage were applied to the: shoulder for 15 minutes, including:  Grade I/II post/inferior mobs  Gentle oscillations  reassessment    neuromuscular re-education activities to improve: Balance, Coordination,  Kinesthetic, Sense, Proprioception, and Posture for 30 minutes. The following activities were included:  Scap squeezes 5min  Shoulder shrugs 5 sec holds 30x  Table slides(flexion, ER bias, and scaption) 5 min each  therapeutic activities to improve functional performance for 0  minutes, including:       Assessment & Plan   Assessment:     Pt presents with good continued progress. Achieved staged ROM goals upon arrival. Required verbal and tactile cue for full scapular retraction and UT compensation during scap squeezes. Responded well to cueing. Pt educated on strategies for tieing his shoes while maintaining compliance with shoulder precautions. Good compliance with patient's HEP. Will continue to progress per protocol.      Patient will continue to benefit from skilled outpatient physical therapy to address the deficits listed in the problem list box on initial evaluation, provide pt/family education and to maximize pt's level of independence in the home and community environment.     Patient's spiritual, cultural, and educational needs considered and patient agreeable to plan of care and goals.           Plan:  Continue POC    Goals:   Active       Long Term Goals       Pt will improve FOTO score to </=see FOTO% limited to decrease perceived limitation with carrying, moving, and handling objects.       Start:  02/24/25    Expected End:  08/11/25            Pt will increase  shoulder AROM to WFL in all planes to improve functional use of R RUE.       Start:  02/24/25    Expected End:  08/11/25            Pt will improve  shoulder MMTs to = 4+/5 to promote equal use of B UEs in performing functional tasks.       Start:  02/24/25    Expected End:  08/11/25            Pt will lift 10 lb objects without pain to promote functional QOL.       Start:  02/24/25    Expected End:  08/11/25            Pt to report pain </= 0/10       Start:  02/24/25    Expected End:  08/11/25               Short Term Goals        Pt will be  independent with HEP to supplement PT in improving functional use of L UE.       Start:  02/24/25    Expected End:  03/24/25            Pt will increase pain free shoulder elevation PROM to >/= 160 deg to improve functional mobility of UE       Start:  02/24/25    Expected End:  03/24/25            Pt will increase shoulder ER PROM in 90 deg abduction to >/=20 deg to improve functional mobility of UE       Start:  02/24/25    Expected End:  03/24/25            Pt will increase elbow AAROM to WNL deg in 4 weeks to improve functional mobility of UE.       Start:  02/24/25    Expected End:  03/24/25                Delfino Quan, PT DPT

## 2025-03-24 ENCOUNTER — CLINICAL SUPPORT (OUTPATIENT)
Dept: REHABILITATION | Facility: HOSPITAL | Age: 70
End: 2025-03-24
Payer: MEDICARE

## 2025-03-24 DIAGNOSIS — R29.898 DECREASED STRENGTH OF UPPER EXTREMITY: ICD-10-CM

## 2025-03-24 DIAGNOSIS — M25.612 DECREASED RANGE OF MOTION OF LEFT SHOULDER: Primary | ICD-10-CM

## 2025-03-24 PROCEDURE — 97110 THERAPEUTIC EXERCISES: CPT | Mod: HCNC

## 2025-03-24 PROCEDURE — 97140 MANUAL THERAPY 1/> REGIONS: CPT | Mod: HCNC

## 2025-03-24 PROCEDURE — 97112 NEUROMUSCULAR REEDUCATION: CPT | Mod: HCNC

## 2025-03-24 NOTE — PROGRESS NOTES
Outpatient Rehab    Physical Therapy Visit    Patient Name: Ron Christina  MRN: [id number]  YOB: 1955  Encounter Date: 3/17/2025    Therapy Diagnosis:   Encounter Diagnoses   Name Primary?    Decreased range of motion of left shoulder Yes    Decreased strength of upper extremity          Physician: Alma Hathaway*    Physician Orders: Eval and Treat    Medical Diagnosis: Traumatic complete tear of left rotator cuff, initial encounter [S46.012A]      Visit # / Visits Authorized:  6 / 20   Date of Evaluation:  2/24/2025   Insurance Authorization Period: 2/11/2025 to 2/11/2026  Plan of Care Certification:  2/24/2025 to 5/19/2025      Time In:   8:30am  Time Out:  9:30am  Total Time:   60  Total Billable Time: 60    FOTO:  Intake Score:  %  Survey Score 1:  %  Survey Score 2:  %         Subjective         Pt reports shoulder is feeling good. Felt a little more stiff this morning after peeling crawfish yesterday.    Objective    S/p L RCR DOS: 2/21/2025.   Pt is 4wks, 3 days out as of 3/24/2025      Shoulder Passive Range of Motion: 3/24/2025  Shoulder Right Left   Flexion    170 110   ER at 0    80 30   ER at 90    Not tested 2/2 post-op Not tested 2/2 post-op   IR at 90    Not tested 2/2 post-op Not tested 2/2 post-op         Treatment         Ron received the treatments listed below:      therapeutic exercises to develop strength, endurance, ROM, flexibility, posture, and core stabilization for 15 minutes including:  Supine dowel AAROM ER 30x  Supine dowel AAROM chest press 3x10  Supine dowel AAROM chest press to overhead elevation 20x  NT:  Wrist [id number]  Elbow arom 20x(pro, elke, sup)  Pendelum hangs 1 min 3x  manual therapy techniques: Joint mobilizations, Manual traction, Myofacial release, Manual Lymphatic Drainage, Soft tissue Mobilization, and Friction Massage were applied to the: shoulder for 15 minutes, including:  Grade I/II post/inferior mobs  Gentle  oscillations  reassessment    neuromuscular re-education activities to improve: Balance, Coordination, Kinesthetic, Sense, Proprioception, and Posture for 30 minutes. The following activities were included:  Scap squeezes 5min  Shoulder shrugs 5 sec holds 30x  Table slides(flexion, ER bias, and scaption) 5 min each  therapeutic activities to improve functional performance for 0  minutes, including:       Assessment & Plan   Assessment:     Pt presents with good continued progress. Achieved staged ROM goals upon arrival. Improved scapular retraction from previous session. Responded well to cueing. Progressed to supine dowel assist overhead elevation with good tolerance. Good compliance with patient's HEP. Will continue to progress per protocol.      Patient will continue to benefit from skilled outpatient physical therapy to address the deficits listed in the problem list box on initial evaluation, provide pt/family education and to maximize pt's level of independence in the home and community environment.     Patient's spiritual, cultural, and educational needs considered and patient agreeable to plan of care and goals.           Plan:  Continue POC    Goals:   Active       Long Term Goals       Pt will improve FOTO score to </=see FOTO% limited to decrease perceived limitation with carrying, moving, and handling objects.       Start:  02/24/25    Expected End:  08/11/25            Pt will increase  shoulder AROM to WFL in all planes to improve functional use of R RUE.       Start:  02/24/25    Expected End:  08/11/25            Pt will improve  shoulder MMTs to = 4+/5 to promote equal use of B UEs in performing functional tasks.       Start:  02/24/25    Expected End:  08/11/25            Pt will lift 10 lb objects without pain to promote functional QOL.       Start:  02/24/25    Expected End:  08/11/25            Pt to report pain </= 0/10       Start:  02/24/25    Expected End:  08/11/25               Short Term  Goals        Pt will be independent with HEP to supplement PT in improving functional use of L UE.       Start:  02/24/25    Expected End:  03/24/25            Pt will increase pain free shoulder elevation PROM to >/= 160 deg to improve functional mobility of UE       Start:  02/24/25    Expected End:  03/24/25            Pt will increase shoulder ER PROM in 90 deg abduction to >/=20 deg to improve functional mobility of UE       Start:  02/24/25    Expected End:  03/24/25            Pt will increase elbow AAROM to WNL deg in 4 weeks to improve functional mobility of UE.       Start:  02/24/25    Expected End:  03/24/25                Delfino Quan, PT DPT

## 2025-03-26 ENCOUNTER — CLINICAL SUPPORT (OUTPATIENT)
Dept: REHABILITATION | Facility: HOSPITAL | Age: 70
End: 2025-03-26
Payer: MEDICARE

## 2025-03-26 DIAGNOSIS — R29.898 DECREASED STRENGTH OF UPPER EXTREMITY: ICD-10-CM

## 2025-03-26 DIAGNOSIS — M25.612 DECREASED RANGE OF MOTION OF LEFT SHOULDER: Primary | ICD-10-CM

## 2025-03-26 PROCEDURE — 97112 NEUROMUSCULAR REEDUCATION: CPT | Mod: HCNC

## 2025-03-26 PROCEDURE — 97140 MANUAL THERAPY 1/> REGIONS: CPT | Mod: HCNC

## 2025-03-26 PROCEDURE — 97110 THERAPEUTIC EXERCISES: CPT | Mod: HCNC

## 2025-03-26 NOTE — PROGRESS NOTES
Outpatient Rehab    Physical Therapy Visit    Patient Name: Ron Christina  MRN: [id number]  YOB: 1955  Encounter Date: 3/17/2025    Therapy Diagnosis:   Encounter Diagnoses   Name Primary?    Decreased range of motion of left shoulder Yes    Decreased strength of upper extremity          Physician: Alma Hathaway*    Physician Orders: Eval and Treat    Medical Diagnosis: Traumatic complete tear of left rotator cuff, initial encounter [S46.012A]      Visit # / Visits Authorized:  9 / 20   Date of Evaluation:  2/24/2025   Insurance Authorization Period: 2/11/2025 to 2/11/2026  Plan of Care Certification:  2/24/2025 to 5/19/2025      Time In:   8:50am  Time Out:  9:50am  Total Time:   60  Total Billable Time: 60    FOTO:  Intake Score:  %  Survey Score 1:  %  Survey Score 2:  %         Subjective         Pt reports the back of his shoulder and down the back of his arm are a little sore this morning.     Objective    S/p L RCR DOS: 2/21/2025.   Pt is 4wks, 5 days out as of 3/24/2025      Shoulder Passive Range of Motion: 3/26/2025  Shoulder Right Left   Flexion    170 110   ER at 0    80 30   ER at 90    Not tested 2/2 post-op Not tested 2/2 post-op   IR at 90    Not tested 2/2 post-op Not tested 2/2 post-op         Treatment     Ron received the treatments listed below:      therapeutic exercises to develop strength, endurance, ROM, flexibility, posture, and core stabilization for 15 minutes including:  Supine dowel AAROM ER 30x  Supine dowel AAROM chest press 3x10  Supine dowel AAROM chest press to overhead elevation 30x  NT:  Wrist [id number]  Elbow arom 20x(pro, elke, sup)  Pendelum hangs 1 min 3x  manual therapy techniques: Joint mobilizations, Manual traction, Myofacial release, Manual Lymphatic Drainage, Soft tissue Mobilization, and Friction Massage were applied to the: shoulder for 15 minutes, including:  Grade I/II post/inferior mobs  Gentle oscillations  reassessment    neuromuscular  re-education activities to improve: Balance, Coordination, Kinesthetic, Sense, Proprioception, and Posture for 30 minutes. The following activities were included:  Scap squeezes 5min  Shoulder shrugs 5 sec holds 30x  Table slides(flexion, ER bias, and scaption) 5 min each  therapeutic activities to improve functional performance for 0  minutes, including:       Assessment & Plan   Assessment:     Pt presents with good continued progress. Achieved staged ROM goals upon arrival. Decreased the ROM on AAROM dowel flexion due to increased soreness. Pt instructed to stop before feeling pain and reports more comfort and no pain with the exercise. Pt demonstrates good scapular retraction with verbal and tactile cues. Good compliance with patient's HEP. Will continue to progress per protocol.    Patient will continue to benefit from skilled outpatient physical therapy to address the deficits listed in the problem list box on initial evaluation, provide pt/family education and to maximize pt's level of independence in the home and community environment.     Patient's spiritual, cultural, and educational needs considered and patient agreeable to plan of care and goals.           Plan:  Continue POC    Goals:   Active       Long Term Goals       Pt will improve FOTO score to </=see FOTO% limited to decrease perceived limitation with carrying, moving, and handling objects.       Start:  02/24/25    Expected End:  08/11/25            Pt will increase  shoulder AROM to WFL in all planes to improve functional use of R RUE.       Start:  02/24/25    Expected End:  08/11/25            Pt will improve  shoulder MMTs to = 4+/5 to promote equal use of B UEs in performing functional tasks.       Start:  02/24/25    Expected End:  08/11/25            Pt will lift 10 lb objects without pain to promote functional QOL.       Start:  02/24/25    Expected End:  08/11/25            Pt to report pain </= 0/10       Start:  02/24/25    Expected  End:  08/11/25               Short Term Goals        Pt will be independent with HEP to supplement PT in improving functional use of L UE.       Start:  02/24/25    Expected End:  03/24/25            Pt will increase pain free shoulder elevation PROM to >/= 160 deg to improve functional mobility of UE       Start:  02/24/25    Expected End:  03/24/25            Pt will increase shoulder ER PROM in 90 deg abduction to >/=20 deg to improve functional mobility of UE       Start:  02/24/25    Expected End:  03/24/25            Pt will increase elbow AAROM to WNL deg in 4 weeks to improve functional mobility of UE.       Start:  02/24/25    Expected End:  03/24/25                Delfino Quan, PT DPT

## 2025-03-26 NOTE — PROGRESS NOTES
Outpatient Rehab    Physical Therapy Visit    Patient Name: Ron Christina  MRN: [id number]  YOB: 1955  Encounter Date: 3/17/2025    Therapy Diagnosis:   Encounter Diagnoses   Name Primary?    Decreased range of motion of left shoulder Yes    Decreased strength of upper extremity          Physician: Alma Hathaway*    Physician Orders: Eval and Treat    Medical Diagnosis: Traumatic complete tear of left rotator cuff, initial encounter [S46.012A]      Visit # / Visits Authorized:  9 / 20   Date of Evaluation:  2/24/2025   Insurance Authorization Period: 2/11/2025 to 2/11/2026  Plan of Care Certification:  2/24/2025 to 5/19/2025      Time In:   8:50am  Time Out:  9:50am  Total Time:   60  Total Billable Time: 60    FOTO:  Intake Score:  %  Survey Score 1:  %  Survey Score 2:  %         Subjective         Pt reports the back of his shoulder and down the back of his arm are a little sore this morning.     Objective    S/p L RCR DOS: 2/21/2025.   Pt is 4wks, 5 days out as of 3/24/2025      Shoulder Passive Range of Motion: 3/26/2025  Shoulder Right Left   Flexion    170 110   ER at 0    80 30   ER at 90    Not tested 2/2 post-op Not tested 2/2 post-op   IR at 90    Not tested 2/2 post-op Not tested 2/2 post-op         Treatment     Ron received the treatments listed below:      therapeutic exercises to develop strength, endurance, ROM, flexibility, posture, and core stabilization for 15 minutes including:  Supine dowel AAROM ER 30x  Supine dowel AAROM chest press 3x10  Supine dowel AAROM chest press to overhead elevation 30x  NT:  Wrist [id number]  Elbow arom 20x(pro, elke, sup)  Pendelum hangs 1 min 3x  manual therapy techniques: Joint mobilizations, Manual traction, Myofacial release, Manual Lymphatic Drainage, Soft tissue Mobilization, and Friction Massage were applied to the: shoulder for 15 minutes, including:  Grade I/II post/inferior mobs  Gentle oscillations  reassessment    neuromuscular  re-education activities to improve: Balance, Coordination, Kinesthetic, Sense, Proprioception, and Posture for 30 minutes. The following activities were included:  Scap squeezes 5min  Shoulder shrugs 5 sec holds 30x  Table slides(flexion, ER bias, and scaption) 5 min each  therapeutic activities to improve functional performance for 0  minutes, including:       Assessment & Plan   Assessment:     Pt presents with good continued progress. Achieved staged ROM goals upon arrival. Decreased the ROM on AAROM dowel flexion due to increased soreness. Pt instructed to stop before feeling pain and reports more comfort and no pain with the exercise. Pt demonstrates good scapular retraction with verbal and tactile cues. Good compliance with patient's HEP. Will continue to progress per protocol.    Patient will continue to benefit from skilled outpatient physical therapy to address the deficits listed in the problem list box on initial evaluation, provide pt/family education and to maximize pt's level of independence in the home and community environment.     Patient's spiritual, cultural, and educational needs considered and patient agreeable to plan of care and goals.           Plan:  Continue POC    Goals:   Active       Long Term Goals       Pt will improve FOTO score to </=see FOTO% limited to decrease perceived limitation with carrying, moving, and handling objects.       Start:  02/24/25    Expected End:  08/11/25            Pt will increase  shoulder AROM to WFL in all planes to improve functional use of R RUE.       Start:  02/24/25    Expected End:  08/11/25            Pt will improve  shoulder MMTs to = 4+/5 to promote equal use of B UEs in performing functional tasks.       Start:  02/24/25    Expected End:  08/11/25            Pt will lift 10 lb objects without pain to promote functional QOL.       Start:  02/24/25    Expected End:  08/11/25            Pt to report pain </= 0/10       Start:  02/24/25    Expected  End:  08/11/25               Short Term Goals        Pt will be independent with HEP to supplement PT in improving functional use of L UE.       Start:  02/24/25    Expected End:  03/24/25            Pt will increase pain free shoulder elevation PROM to >/= 160 deg to improve functional mobility of UE       Start:  02/24/25    Expected End:  03/24/25            Pt will increase shoulder ER PROM in 90 deg abduction to >/=20 deg to improve functional mobility of UE       Start:  02/24/25    Expected End:  03/24/25            Pt will increase elbow AAROM to WNL deg in 4 weeks to improve functional mobility of UE.       Start:  02/24/25    Expected End:  03/24/25                Delfino Quan, PT DPT

## 2025-03-27 ENCOUNTER — LAB VISIT (OUTPATIENT)
Dept: LAB | Facility: HOSPITAL | Age: 70
End: 2025-03-27
Attending: INTERNAL MEDICINE
Payer: MEDICARE

## 2025-03-27 DIAGNOSIS — I82.409 RECURRENT DEEP VEIN THROMBOSIS (DVT): ICD-10-CM

## 2025-03-27 DIAGNOSIS — E78.49 OTHER HYPERLIPIDEMIA: Chronic | ICD-10-CM

## 2025-03-27 DIAGNOSIS — Z12.5 SCREENING FOR PROSTATE CANCER: ICD-10-CM

## 2025-03-27 LAB
ABSOLUTE EOSINOPHIL (OHS): 0.13 K/UL
ABSOLUTE MONOCYTE (OHS): 0.55 K/UL (ref 0.3–1)
ABSOLUTE NEUTROPHIL COUNT (OHS): 3.63 K/UL (ref 1.8–7.7)
ALBUMIN SERPL BCP-MCNC: 4.1 G/DL (ref 3.5–5.2)
ALP SERPL-CCNC: 58 UNIT/L (ref 40–150)
ALT SERPL W/O P-5'-P-CCNC: 23 UNIT/L (ref 10–44)
ANION GAP (OHS): 9 MMOL/L (ref 8–16)
AST SERPL-CCNC: 18 UNIT/L (ref 11–45)
BASOPHILS # BLD AUTO: 0.04 K/UL
BASOPHILS NFR BLD AUTO: 0.6 %
BILIRUB SERPL-MCNC: 0.7 MG/DL (ref 0.1–1)
BUN SERPL-MCNC: 18 MG/DL (ref 8–23)
CALCIUM SERPL-MCNC: 9.9 MG/DL (ref 8.7–10.5)
CHLORIDE SERPL-SCNC: 104 MMOL/L (ref 95–110)
CHOLEST SERPL-MCNC: 228 MG/DL (ref 120–199)
CHOLEST/HDLC SERPL: 4.1 {RATIO} (ref 2–5)
CO2 SERPL-SCNC: 25 MMOL/L (ref 23–29)
CREAT SERPL-MCNC: 0.8 MG/DL (ref 0.5–1.4)
ERYTHROCYTE [DISTWIDTH] IN BLOOD BY AUTOMATED COUNT: 12.8 % (ref 11.5–14.5)
GFR SERPLBLD CREATININE-BSD FMLA CKD-EPI: >60 ML/MIN/1.73/M2
GLUCOSE SERPL-MCNC: 98 MG/DL (ref 70–110)
HCT VFR BLD AUTO: 49.3 % (ref 40–54)
HDLC SERPL-MCNC: 56 MG/DL (ref 40–75)
HDLC SERPL: 24.6 % (ref 20–50)
HGB BLD-MCNC: 16.3 GM/DL (ref 14–18)
IMM GRANULOCYTES # BLD AUTO: 0.03 K/UL (ref 0–0.04)
IMM GRANULOCYTES NFR BLD AUTO: 0.5 % (ref 0–0.5)
LDLC SERPL CALC-MCNC: 153.4 MG/DL (ref 63–159)
LYMPHOCYTES # BLD AUTO: 1.97 K/UL (ref 1–4.8)
MCH RBC QN AUTO: 32.5 PG (ref 27–50)
MCHC RBC AUTO-ENTMCNC: 33.1 G/DL (ref 32–36)
MCV RBC AUTO: 98 FL (ref 82–98)
NONHDLC SERPL-MCNC: 172 MG/DL
NUCLEATED RBC (/100WBC) (OHS): 0 /100 WBC
PLATELET # BLD AUTO: 190 K/UL (ref 150–450)
PMV BLD AUTO: 10.2 FL (ref 9.2–12.9)
POTASSIUM SERPL-SCNC: 4.1 MMOL/L (ref 3.5–5.1)
PROT SERPL-MCNC: 7.8 GM/DL (ref 6–8.4)
PSA SERPL-MCNC: 0.82 NG/ML
RBC # BLD AUTO: 5.01 M/UL (ref 4.6–6.2)
RELATIVE EOSINOPHIL (OHS): 2 %
RELATIVE LYMPHOCYTE (OHS): 31 % (ref 18–48)
RELATIVE MONOCYTE (OHS): 8.7 % (ref 4–15)
RELATIVE NEUTROPHIL (OHS): 57.2 % (ref 38–73)
SODIUM SERPL-SCNC: 138 MMOL/L (ref 136–145)
TRIGL SERPL-MCNC: 93 MG/DL (ref 30–150)
WBC # BLD AUTO: 6.35 K/UL (ref 3.9–12.7)

## 2025-03-27 PROCEDURE — 84153 ASSAY OF PSA TOTAL: CPT | Mod: HCNC

## 2025-03-27 PROCEDURE — 85025 COMPLETE CBC W/AUTO DIFF WBC: CPT | Mod: HCNC

## 2025-03-27 PROCEDURE — 80061 LIPID PANEL: CPT | Mod: HCNC

## 2025-03-27 PROCEDURE — 80053 COMPREHEN METABOLIC PANEL: CPT | Mod: HCNC

## 2025-03-27 PROCEDURE — 36415 COLL VENOUS BLD VENIPUNCTURE: CPT | Mod: HCNC,PO

## 2025-03-28 ENCOUNTER — RESULTS FOLLOW-UP (OUTPATIENT)
Dept: INTERNAL MEDICINE | Facility: CLINIC | Age: 70
End: 2025-03-28

## 2025-03-29 NOTE — PROGRESS NOTES
S:Ron Christina presents for post-operative evaluation.     DATE OF PROCEDURE: 2/21/2025   OPERATION:   Left  1. Shoulder arthroscopic rotator cuff repair with Regeneten patch augmentation (CPT 95019-33)  2. Shoulder arthroscopic biceps tenodesis (CPT 58509)  3. Shoulder arthroscopic extensive debridement (CPT 32182)    4. Shoulder arthroscopic subacromial decompression     Ron Christina reports to be doing well 5 weeks 6 days s/p the above mentioned procedure. Going to PT at the [facility].  States the shoulder is feeling quite good.  No pain.  Pleased with recovery.  No numbness or tingling.    O:  Exam of the left shoulder shows well-healed incisions.  No significant swelling.  No signs of infection.  No significant tenderness.  Active forward elevation scapular plane to 80-90, passive to 130-140.  Passive external rotation with arm at side to 50-60 degrees.  No significant stiffness.  Good cuff activation.  No scapular winging.  Motor and sensory intact to the left hand.    A/P: Arthroscopic pictures were reviewed with the patient.  Complex repair given the medialized musculotendinous zone injury with Regeneten patch.  May begin the active phase of recovery.  No lifting more than a coke can or small book.  Focus on normalizing both active and passive range of motion.  Resisted/strengthening phase of recovery starts in 6 weeks.  Discontinue sling and pillow.  Prescription given for Celebrex.  I will see him back in 6-7 weeks.

## 2025-03-31 ENCOUNTER — CLINICAL SUPPORT (OUTPATIENT)
Dept: REHABILITATION | Facility: HOSPITAL | Age: 70
End: 2025-03-31
Payer: MEDICARE

## 2025-03-31 DIAGNOSIS — M25.612 DECREASED RANGE OF MOTION OF LEFT SHOULDER: Primary | ICD-10-CM

## 2025-03-31 DIAGNOSIS — R29.898 DECREASED STRENGTH OF UPPER EXTREMITY: ICD-10-CM

## 2025-03-31 PROCEDURE — 97110 THERAPEUTIC EXERCISES: CPT | Mod: HCNC

## 2025-03-31 PROCEDURE — 97112 NEUROMUSCULAR REEDUCATION: CPT | Mod: HCNC

## 2025-03-31 PROCEDURE — 97140 MANUAL THERAPY 1/> REGIONS: CPT | Mod: HCNC

## 2025-03-31 NOTE — PROGRESS NOTES
Outpatient Rehab    Physical Therapy Visit    Patient Name: Ron Christina  MRN: [id number]  YOB: 1955  Encounter Date: 3/17/2025    Therapy Diagnosis:   Encounter Diagnoses   Name Primary?    Decreased range of motion of left shoulder Yes    Decreased strength of upper extremity          Physician: Alma Hathaway*    Physician Orders: Eval and Treat    Medical Diagnosis: Traumatic complete tear of left rotator cuff, initial encounter [S46.012A]      Visit # / Visits Authorized:  10 / 20   Date of Evaluation:  2/24/2025   Insurance Authorization Period: 2/11/2025 to 2/11/2026  Plan of Care Certification:  2/24/2025 to 5/19/2025      Time In:   7:30am  Time Out:  8:30am  Total Time:   60  Total Billable Time: 60    FOTO:  Intake Score:  %  Survey Score 1:  %  Survey Score 2:  %         Subjective         Pt reports he had soreness in the front of his shoulder while doing the dowel chest press at home since he was not using the wedge under his arm. He stopped doing those.    Objective    S/p L RCR DOS: 2/21/2025.   Pt is 5wks, 6 days out as of 3/31/2025      Shoulder Passive Range of Motion: 3/31/2025  Shoulder Right Left   Flexion    170 110   ER at 0    80 30   ER at 90    Not tested 2/2 post-op Not tested 2/2 post-op   IR at 90    Not tested 2/2 post-op Not tested 2/2 post-op         Treatment     Ron received the treatments listed below:      therapeutic exercises to develop strength, endurance, ROM, flexibility, posture, and core stabilization for 15 minutes including:  Supine dowel AAROM ER 30x  Supine dowel AAROM chest press 3x10  Supine dowel AAROM chest press to overhead elevation 30x  NT:  Wrist [id number]  Elbow arom 20x(pro, elke, sup)  Pendelum hangs 1 min 3x  manual therapy techniques: Joint mobilizations, Manual traction, Myofacial release, Manual Lymphatic Drainage, Soft tissue Mobilization, and Friction Massage were applied to the: shoulder for 15 minutes, including:  Grade  I/II post/inferior mobs  Gentle oscillations  reassessment    neuromuscular re-education activities to improve: Balance, Coordination, Kinesthetic, Sense, Proprioception, and Posture for 30 minutes. The following activities were included:  Scap squeezes 5min  Table slides(flexion, ER bias, and scaption) 5 min each  ER walkout yellow TB 2x10  IR walkout yellow TB 2x10    NT:  Shoulder shrugs 5 sec holds 30x  therapeutic activities to improve functional performance for 0  minutes, including:       Assessment & Plan   Assessment:     Pt presents with good continued progress. Achieved staged ROM goals upon arrival. Initiated ER and IR walkouts today to facilitate light RTC activation, which pt tolerated well. Pt demonstrates improved technique with scapular retraction without upper trap compensation today. Good compliance with patient's HEP. Will continue to progress per protocol.    Patient will continue to benefit from skilled outpatient physical therapy to address the deficits listed in the problem list box on initial evaluation, provide pt/family education and to maximize pt's level of independence in the home and community environment.     Patient's spiritual, cultural, and educational needs considered and patient agreeable to plan of care and goals.           Plan:  Continue POC    Goals:   Active       Long Term Goals       Pt will improve FOTO score to </=see FOTO% limited to decrease perceived limitation with carrying, moving, and handling objects.       Start:  02/24/25    Expected End:  08/11/25            Pt will increase  shoulder AROM to WFL in all planes to improve functional use of R RUE.       Start:  02/24/25    Expected End:  08/11/25            Pt will improve  shoulder MMTs to = 4+/5 to promote equal use of B UEs in performing functional tasks.       Start:  02/24/25    Expected End:  08/11/25            Pt will lift 10 lb objects without pain to promote functional QOL.       Start:  02/24/25     Expected End:  08/11/25            Pt to report pain </= 0/10       Start:  02/24/25    Expected End:  08/11/25               Short Term Goals        Pt will be independent with HEP to supplement PT in improving functional use of L UE.       Start:  02/24/25    Expected End:  03/24/25            Pt will increase pain free shoulder elevation PROM to >/= 160 deg to improve functional mobility of UE       Start:  02/24/25    Expected End:  03/24/25            Pt will increase shoulder ER PROM in 90 deg abduction to >/=20 deg to improve functional mobility of UE       Start:  02/24/25    Expected End:  03/24/25            Pt will increase elbow AAROM to WNL deg in 4 weeks to improve functional mobility of UE.       Start:  02/24/25    Expected End:  03/24/25                Delfino Quan, PT DPT

## 2025-04-01 ENCOUNTER — OFFICE VISIT (OUTPATIENT)
Dept: INTERNAL MEDICINE | Facility: CLINIC | Age: 70
End: 2025-04-01
Payer: MEDICARE

## 2025-04-01 VITALS
HEIGHT: 73 IN | WEIGHT: 230.81 LBS | HEART RATE: 77 BPM | BODY MASS INDEX: 30.59 KG/M2 | OXYGEN SATURATION: 96 % | DIASTOLIC BLOOD PRESSURE: 70 MMHG | SYSTOLIC BLOOD PRESSURE: 118 MMHG

## 2025-04-01 DIAGNOSIS — E66.811 OBESITY (BMI 30.0-34.9): Chronic | ICD-10-CM

## 2025-04-01 DIAGNOSIS — E78.49 OTHER HYPERLIPIDEMIA: Chronic | ICD-10-CM

## 2025-04-01 DIAGNOSIS — Z00.00 ROUTINE MEDICAL EXAM: Primary | ICD-10-CM

## 2025-04-01 DIAGNOSIS — Z12.5 SCREENING FOR PROSTATE CANCER: ICD-10-CM

## 2025-04-01 PROCEDURE — 3078F DIAST BP <80 MM HG: CPT | Mod: HCNC,CPTII,S$GLB, | Performed by: INTERNAL MEDICINE

## 2025-04-01 PROCEDURE — 1126F AMNT PAIN NOTED NONE PRSNT: CPT | Mod: HCNC,CPTII,S$GLB, | Performed by: INTERNAL MEDICINE

## 2025-04-01 PROCEDURE — 1159F MED LIST DOCD IN RCRD: CPT | Mod: HCNC,CPTII,S$GLB, | Performed by: INTERNAL MEDICINE

## 2025-04-01 PROCEDURE — 3074F SYST BP LT 130 MM HG: CPT | Mod: HCNC,CPTII,S$GLB, | Performed by: INTERNAL MEDICINE

## 2025-04-01 PROCEDURE — 99397 PER PM REEVAL EST PAT 65+ YR: CPT | Mod: HCNC,S$GLB,, | Performed by: INTERNAL MEDICINE

## 2025-04-01 PROCEDURE — 1160F RVW MEDS BY RX/DR IN RCRD: CPT | Mod: HCNC,CPTII,S$GLB, | Performed by: INTERNAL MEDICINE

## 2025-04-01 PROCEDURE — 3008F BODY MASS INDEX DOCD: CPT | Mod: HCNC,CPTII,S$GLB, | Performed by: INTERNAL MEDICINE

## 2025-04-01 PROCEDURE — 3288F FALL RISK ASSESSMENT DOCD: CPT | Mod: HCNC,CPTII,S$GLB, | Performed by: INTERNAL MEDICINE

## 2025-04-01 PROCEDURE — 99999 PR PBB SHADOW E&M-EST. PATIENT-LVL III: CPT | Mod: PBBFAC,HCNC,, | Performed by: INTERNAL MEDICINE

## 2025-04-01 PROCEDURE — 1101F PT FALLS ASSESS-DOCD LE1/YR: CPT | Mod: HCNC,CPTII,S$GLB, | Performed by: INTERNAL MEDICINE

## 2025-04-01 NOTE — PROGRESS NOTES
Assessment & Plan  Assessment & Plan    IMPRESSION:  - Reviewed recent shoulder surgery and post-op progress.  - BP good despite current physical limitations.  - Evaluated recent lab results, including blood counts, electrolytes, kidney and liver function, blood sugar, prostate-specific antigen, and lipid panel.  - Patient completed recent stool test for colon cancer screening.    PLAN SUMMARY:  - Continue using sling for shoulder recovery  - Undergo physical therapy as prescribed  - Consider coronary calcium score test after shoulder recovery  - Potential statin therapy based on future test results  - Follow up in 1 year for annual check-up, unless needed sooner  - Contact office if interested in scheduling coronary calcium score test    RIGHT SHOULDER MUSCLE WASTING AND ATROPHY:  - Noted muscle wasting in the patient's right arm due to sling use.  - Evaluated the extent of muscle loss with the therapist, who plans to assist in muscle regain.  - Instructed the patient to continue using the sling for shoulder recovery and undergo physical therapy as prescribed.  - Acknowledged the patient is ahead of schedule in therapy for shoulder recovery.  - Recognized the patient's progress and the extent of the shoulder surgery.  - Advised the patient to continue recovery and wait for full healing before considering other tests.    PERSONAL HISTORY OF SHOULDER INJURY:  - Reviewed the patient's history of shoulder injury from volleyball.  - Noted previous evaluation of shoulder injury by another doctor.  - Assessed the patient's history of shoulder injury in relation to the current repair.  - Confirmed that the current shoulder surgery includes repair of previously injured bicep.    HYPERLIPIDEMIA:  - Reviewed the patient's cholesterol panel.  - Noted cholesterol levels are on the high normal side.  - Considered coronary calcium score to assess cardiovascular risk.  - Recommend considering coronary calcium score test after  shoulder recovery is complete.  - Explained the purpose and potential benefits of coronary calcium score test in assessing cardiovascular risk.  - Discussed how test results could inform decisions about cholesterol management and further cardiac testing, given borderline cholesterol levels.  - Suggested potential statin therapy based on future test results.  - Instructed the patient to contact the office if interested in scheduling the coronary calcium score test.    FOLLOW-UP:  - Follow up in 1 year for annual check-up, unless needed sooner.         1. Routine medical exam  -    Discussed healthy diet, regular exercise, necessary labs, age appropriate cancer screening, and routine vaccinations.       2. Other hyperlipidemia  Overview:  ASCVD risk score indicates need for statin.  I have counseled the patient on this risk factor as well also recommendation for statin use for reduction of heart attack and stroke risk.  He has verbally acknowledged this information and wishes NOT to pursue statins.  He is aware that this decision leaves him untreated for significant cardiovascular risk    Orders:  -     Comprehensive Metabolic Panel; Future; Expected date: 04/01/2026  -     Lipid Panel; Future; Expected date: 04/01/2026    3. Obesity (BMI 30.0-34.9)  Assessment & Plan:  The patient's BMI has been recorded in the chart. The patient has been provided educational materials regarding the benefits of attaining and maintaining a normal weight. We will continue to address and follow this issue during follow up visits.       4. Screening for prostate cancer  -     PSA, Screening; Future; Expected date: 04/01/2026          Follow-up: Follow up in about 1 year (around 4/1/2026) for Routine Physical.    This note was generated with the assistance of ambient listening technology. Verbal consent was obtained by the patient and accompanying visitor(s) for the recording of patient appointment to facilitate this note. I attest to  having reviewed and edited the generated note for accuracy, though some syntax or spelling errors may persist. Please contact the author of this note for any clarification.      ______________________________________________________________________    Chief Complaint  Chief Complaint   Patient presents with    Annual Exam       History of Present Illness    CHIEF COMPLAINT:  Mr. Christina presents today for follow up after shoulder surgery.    POST-OPERATIVE SHOULDER STATUS:  He was seen at initial post-operative visit 3 weeks ago with good progress and reports being ahead of schedule in therapy according to therapists. He notes significant muscle loss in the affected arm with palpable bone due to muscle atrophy. Prior to surgery, he continued activities like cutting grass and weeding despite shoulder pain. His surgery included three major repairs, including addressing a previous volleyball-related bicep injury from a few years ago that was not surgically treated at that time.    LABS AND IMAGING:  Carotid ultrasounds have been normal, showing no evidence of plaque buildup. Cholesterol panel remains elevated despite slight improvement.    PREVENTIVE CARE:  He completed and mailed stool test for colon cancer screening.            The 10-year ASCVD risk score (Keyanna MORGAN, et al., 2019) is: 16.1%    Values used to calculate the score:      Age: 69 years      Sex: Male      Is Non- : No      Diabetic: No      Tobacco smoker: No      Systolic Blood Pressure: 118 mmHg      Is BP treated: No      HDL Cholesterol: 56 mg/dL      Total Cholesterol: 228 mg/dL     ROS  Review of Systems   Constitutional:  Negative for chills, fever and unexpected weight change.   Respiratory:  Negative for cough, chest tightness, shortness of breath and wheezing.    Cardiovascular:  Negative for chest pain, palpitations and leg swelling.   Gastrointestinal:  Negative for abdominal pain and blood in stool.   Genitourinary:   "Negative for decreased urine volume, dysuria and hematuria.   Musculoskeletal:  Positive for arthralgias (left shoulder, improving). Negative for myalgias.   Neurological:  Negative for dizziness, weakness, light-headedness and headaches.   Psychiatric/Behavioral:  Negative for decreased concentration, dysphoric mood, sleep disturbance and suicidal ideas.            Physical Exam  Vitals:    04/01/25 1401   BP: 118/70   BP Location: Right arm   Patient Position: Sitting   Pulse: 77   SpO2: 96%   Weight: 104.7 kg (230 lb 13.2 oz)   Height: 6' 1" (1.854 m)    Body mass index is 30.45 kg/m².  Weight: 104.7 kg (230 lb 13.2 oz)   Height: 6' 1" (185.4 cm)   Physical Exam  Constitutional:       General: He is not in acute distress.     Appearance: He is well-developed.   HENT:      Head: Normocephalic and atraumatic.   Eyes:      General: Lids are normal. No scleral icterus.     Conjunctiva/sclera: Conjunctivae normal.      Pupils: Pupils are equal, round, and reactive to light.   Neck:      Thyroid: No thyromegaly.      Vascular: No carotid bruit or JVD.   Cardiovascular:      Rate and Rhythm: Normal rate and regular rhythm.      Heart sounds: Normal heart sounds. No murmur heard.     No friction rub. No S3 or S4 sounds.   Pulmonary:      Effort: Pulmonary effort is normal.      Breath sounds: Normal breath sounds. No wheezing, rhonchi or rales.   Abdominal:      General: Bowel sounds are normal. There is no distension.      Palpations: Abdomen is soft.      Tenderness: There is no guarding.   Musculoskeletal:      Cervical back: Full passive range of motion without pain and neck supple.      Right lower leg: No edema.      Left lower leg: No edema.      Comments: Left upper extremity in sling and swath    Skin:     General: Skin is warm and dry.      Findings: No rash.   Neurological:      Mental Status: He is alert and oriented to person, place, and time.   Psychiatric:         Speech: Speech normal.         Behavior: " Behavior normal.         Thought Content: Thought content normal.

## 2025-04-03 ENCOUNTER — OFFICE VISIT (OUTPATIENT)
Dept: SPORTS MEDICINE | Facility: CLINIC | Age: 70
End: 2025-04-03
Payer: MEDICARE

## 2025-04-03 ENCOUNTER — CLINICAL SUPPORT (OUTPATIENT)
Dept: REHABILITATION | Facility: HOSPITAL | Age: 70
End: 2025-04-03
Payer: MEDICARE

## 2025-04-03 ENCOUNTER — RESULTS FOLLOW-UP (OUTPATIENT)
Dept: INTERNAL MEDICINE | Facility: CLINIC | Age: 70
End: 2025-04-03

## 2025-04-03 ENCOUNTER — PATIENT MESSAGE (OUTPATIENT)
Dept: ADMINISTRATIVE | Facility: HOSPITAL | Age: 70
End: 2025-04-03
Payer: MEDICARE

## 2025-04-03 VITALS
SYSTOLIC BLOOD PRESSURE: 120 MMHG | HEART RATE: 105 BPM | BODY MASS INDEX: 30.38 KG/M2 | HEIGHT: 73 IN | WEIGHT: 229.25 LBS | DIASTOLIC BLOOD PRESSURE: 81 MMHG

## 2025-04-03 DIAGNOSIS — M25.612 DECREASED RANGE OF MOTION OF LEFT SHOULDER: Primary | ICD-10-CM

## 2025-04-03 DIAGNOSIS — R29.898 DECREASED STRENGTH OF UPPER EXTREMITY: ICD-10-CM

## 2025-04-03 DIAGNOSIS — Z98.890 S/P LEFT ROTATOR CUFF REPAIR: Primary | ICD-10-CM

## 2025-04-03 PROCEDURE — 97112 NEUROMUSCULAR REEDUCATION: CPT | Mod: HCNC

## 2025-04-03 PROCEDURE — 99999 PR PBB SHADOW E&M-EST. PATIENT-LVL III: CPT | Mod: PBBFAC,HCNC,, | Performed by: ORTHOPAEDIC SURGERY

## 2025-04-03 PROCEDURE — 97110 THERAPEUTIC EXERCISES: CPT | Mod: HCNC

## 2025-04-03 RX ORDER — CELECOXIB 200 MG/1
200 CAPSULE ORAL DAILY
Qty: 30 CAPSULE | Refills: 1 | Status: SHIPPED | OUTPATIENT
Start: 2025-04-03

## 2025-04-03 NOTE — PROGRESS NOTES
Outpatient Rehab    Physical Therapy Visit    Patient Name: Ron Christina  MRN: [id number]  YOB: 1955  Encounter Date: 3/17/2025    Therapy Diagnosis:   Encounter Diagnoses   Name Primary?    Decreased range of motion of left shoulder Yes    Decreased strength of upper extremity          Physician: Amla Hathaway*    Physician Orders: Eval and Treat    Medical Diagnosis: Traumatic complete tear of left rotator cuff, initial encounter [S46.012A]      Visit # / Visits Authorized:  10 / 20   Date of Evaluation:  2/24/2025   Insurance Authorization Period: 2/11/2025 to 2/11/2026  Plan of Care Certification:  2/24/2025 to 5/19/2025      Time In:   10:00am  Time Out:  11:00am  Total Time:   60  Total Billable Time: 60    FOTO:  Intake Score:  %  Survey Score 1:  %  Survey Score 2:  %         Subjective         Pt reports his shoulder is feeling good.    Objective    S/p L RCR DOS: 2/21/2025.   Pt is 5wks, 6 days out as of 3/31/2025      Shoulder Passive Range of Motion: 3/31/2025  Shoulder Right Left   Flexion    170 110   ER at 0    80 30   ER at 90    Not tested 2/2 post-op Not tested 2/2 post-op   IR at 90    Not tested 2/2 post-op Not tested 2/2 post-op         Treatment     Ron received the treatments listed below:      therapeutic exercises to develop strength, endurance, ROM, flexibility, posture, and core stabilization for 15 minutes including:  Supine dowel AAROM ER 30x  Supine dowel AAROM chest press to overhead elevation 30x (narrow and wide)  Arm bike for tissue extensibility 4 min fwd/ 4min bck    NT:  Wrist [id number]  Elbow arom 20x(pro, elke, sup)  Pendelum hangs 1 min 3x  manual therapy techniques: Joint mobilizations, Manual traction, Myofacial release, Manual Lymphatic Drainage, Soft tissue Mobilization, and Friction Massage were applied to the: shoulder for 15 minutes, including:  Grade I/II post/inferior mobs  Gentle oscillations  reassessment    neuromuscular re-education  activities to improve: Balance, Coordination, Kinesthetic, Sense, Proprioception, and Posture for 30 minutes. The following activities were included:  Sidelying dowel assist flexion 2x15  ER walkout yellow TB 2x10  IR walkout yellow TB 2x10    NT:  Shoulder shrugs 5 sec holds 30x  therapeutic activities to improve functional performance for 0  minutes, including:       Assessment & Plan   Assessment:     Pt presents with good continued progress. Achieved staged ROM goals upon arrival. Initiated sidelying dowel assist flexion for progressed aarom into flexion. Pt tolerated the progression well with appropriate levels of fatigue. Required min verbal cues for shoulder shrug compensatory pattern.. Good compliance with patient's HEP. Will continue to progress per protocol.    Patient will continue to benefit from skilled outpatient physical therapy to address the deficits listed in the problem list box on initial evaluation, provide pt/family education and to maximize pt's level of independence in the home and community environment.     Patient's spiritual, cultural, and educational needs considered and patient agreeable to plan of care and goals.           Plan:  Continue POC    Goals:   Active       Long Term Goals       Pt will improve FOTO score to </=see FOTO% limited to decrease perceived limitation with carrying, moving, and handling objects.       Start:  02/24/25    Expected End:  08/11/25            Pt will increase  shoulder AROM to WFL in all planes to improve functional use of R RUE.       Start:  02/24/25    Expected End:  08/11/25            Pt will improve  shoulder MMTs to = 4+/5 to promote equal use of B UEs in performing functional tasks.       Start:  02/24/25    Expected End:  08/11/25            Pt will lift 10 lb objects without pain to promote functional QOL.       Start:  02/24/25    Expected End:  08/11/25            Pt to report pain </= 0/10       Start:  02/24/25    Expected End:  08/11/25                Short Term Goals        Pt will be independent with HEP to supplement PT in improving functional use of L UE.       Start:  02/24/25    Expected End:  03/24/25            Pt will increase pain free shoulder elevation PROM to >/= 160 deg to improve functional mobility of UE       Start:  02/24/25    Expected End:  03/24/25            Pt will increase shoulder ER PROM in 90 deg abduction to >/=20 deg to improve functional mobility of UE       Start:  02/24/25    Expected End:  03/24/25            Pt will increase elbow AAROM to WNL deg in 4 weeks to improve functional mobility of UE.       Start:  02/24/25    Expected End:  03/24/25                Delfino Quan, PT DPT

## 2025-04-07 ENCOUNTER — CLINICAL SUPPORT (OUTPATIENT)
Dept: REHABILITATION | Facility: HOSPITAL | Age: 70
End: 2025-04-07
Payer: MEDICARE

## 2025-04-07 ENCOUNTER — PATIENT MESSAGE (OUTPATIENT)
Dept: SPORTS MEDICINE | Facility: CLINIC | Age: 70
End: 2025-04-07
Payer: MEDICARE

## 2025-04-07 DIAGNOSIS — R29.898 DECREASED STRENGTH OF UPPER EXTREMITY: ICD-10-CM

## 2025-04-07 DIAGNOSIS — M25.612 DECREASED RANGE OF MOTION OF LEFT SHOULDER: Primary | ICD-10-CM

## 2025-04-07 PROCEDURE — 97110 THERAPEUTIC EXERCISES: CPT | Mod: HCNC

## 2025-04-07 PROCEDURE — 97112 NEUROMUSCULAR REEDUCATION: CPT | Mod: HCNC

## 2025-04-07 NOTE — PROGRESS NOTES
Outpatient Rehab    Physical Therapy Visit    Patient Name: Ron Christina  MRN: [id number]  YOB: 1955  Encounter Date: 3/17/2025    Therapy Diagnosis:   Encounter Diagnoses   Name Primary?    Decreased range of motion of left shoulder Yes    Decreased strength of upper extremity          Physician: Alma Hathaway*    Physician Orders: Eval and Treat    Medical Diagnosis: Traumatic complete tear of left rotator cuff, initial encounter [S46.012A]      Visit # / Visits Authorized:  10 / 20   Date of Evaluation:  2/24/2025   Insurance Authorization Period: 2/11/2025 to 2/11/2026  Plan of Care Certification:  2/24/2025 to 5/19/2025      Time In:   8:40am  Time Out:  9:40am  Total Time:   60  Total Billable Time: 60    FOTO:  Intake Score:  %  Survey Score 1:  %  Survey Score 2:  %         Subjective         Pt reports his shoulder is feeling good. Doctor discontinued sling last week and reports no troubles.     Objective    S/p L RCR DOS: 2/21/2025.   Pt is 6wks, 3 days out as of 4/7/2025      Shoulder Passive Range of Motion: 4/7/2025  Shoulder Right Left   Flexion    170 140   ER at 0    80 50   ER at 90    Not tested 2/2 post-op Not tested 2/2 post-op   IR at 90    Not tested 2/2 post-op Not tested 2/2 post-op       Shoulder Active Range of Motion: 4/7/2025  Shoulder Right Left   Flexion   155 Pre-95  Post-115          Treatment     Ron received the treatments listed below:      therapeutic exercises to develop strength, endurance, ROM, flexibility, posture, and core stabilization for 15 minutes including:  Supine dowel AAROM ER 30x  Supine dowel AAROM chest press to overhead elevation 30x (narrow and wide)  Arm bike for tissue extensibility 4 min fwd/ 4min bck    NT:  Wrist [id number]  Elbow arom 20x(pro, elke, sup)  Pendelum hangs 1 min 3x  manual therapy techniques: Joint mobilizations, Manual traction, Myofacial release, Manual Lymphatic Drainage, Soft tissue Mobilization, and Friction  Massage were applied to the: shoulder for 15 minutes, including:  Grade I/II post/inferior mobs  Gentle oscillations  reassessment    neuromuscular re-education activities to improve: Balance, Coordination, Kinesthetic, Sense, Proprioception, and Posture for 30 minutes. The following activities were included:  Sidelying dowel assist flexion 2x15  ER walkout yellow TB 2x15  IR walkout yellow TB 2x15    NT:  Shoulder shrugs 5 sec holds 30x  therapeutic activities to improve functional performance for 0  minutes, including:       Assessment & Plan   Assessment:     Pt presents with good continued carryover in ROM. Continued mobility for ER and AAROM into flexion. Pt presents with 95 degrees AROM flexion, which improves to 115 following manual therapy and AAROM exercises. Passive ER at 0 is 50 degrees, which meets staged ROM goals. Continued with light ER/IR activation. Good compliance with patient's HEP. Will continue to progress per protocol.    Achieved staged ROM goals upon arrival. Initiated sidelying dowel assist flexion for progressed aarom into flexion. Pt tolerated the progression well with appropriate levels of fatigue. Required min verbal cues for shoulder shrug compensatory pattern..  Patient will continue to benefit from skilled outpatient physical therapy to address the deficits listed in the problem list box on initial evaluation, provide pt/family education and to maximize pt's level of independence in the home and community environment.     Patient's spiritual, cultural, and educational needs considered and patient agreeable to plan of care and goals.           Plan:  Continue POC    Goals:   Active       Long Term Goals       Pt will improve FOTO score to </=see FOTO% limited to decrease perceived limitation with carrying, moving, and handling objects.       Start:  02/24/25    Expected End:  08/11/25            Pt will increase  shoulder AROM to WFL in all planes to improve functional use of R RUE.        Start:  02/24/25    Expected End:  08/11/25            Pt will improve  shoulder MMTs to = 4+/5 to promote equal use of B UEs in performing functional tasks.       Start:  02/24/25    Expected End:  08/11/25            Pt will lift 10 lb objects without pain to promote functional QOL.       Start:  02/24/25    Expected End:  08/11/25            Pt to report pain </= 0/10       Start:  02/24/25    Expected End:  08/11/25               Short Term Goals        Pt will be independent with HEP to supplement PT in improving functional use of L UE.       Start:  02/24/25    Expected End:  03/24/25            Pt will increase pain free shoulder elevation PROM to >/= 160 deg to improve functional mobility of UE       Start:  02/24/25    Expected End:  03/24/25            Pt will increase shoulder ER PROM in 90 deg abduction to >/=20 deg to improve functional mobility of UE       Start:  02/24/25    Expected End:  03/24/25            Pt will increase elbow AAROM to WNL deg in 4 weeks to improve functional mobility of UE.       Start:  02/24/25    Expected End:  03/24/25                Delfino Quan, PT DPT

## 2025-04-07 NOTE — PROGRESS NOTES
Outpatient Rehab    Physical Therapy Visit    Patient Name: Ron Christina  MRN: [id number]  YOB: 1955  Encounter Date: 3/17/2025    Therapy Diagnosis:   Encounter Diagnoses   Name Primary?    Decreased range of motion of left shoulder Yes    Decreased strength of upper extremity          Physician: Alma Hathaway*    Physician Orders: Eval and Treat    Medical Diagnosis: Traumatic complete tear of left rotator cuff, initial encounter [S46.012A]      Visit # / Visits Authorized:  10 / 20   Date of Evaluation:  2/24/2025   Insurance Authorization Period: 2/11/2025 to 2/11/2026  Plan of Care Certification:  2/24/2025 to 5/19/2025      Time In:   8:40am  Time Out:  9:40am  Total Time:   60  Total Billable Time: 60    FOTO:  Intake Score:  %  Survey Score 1:  %  Survey Score 2:  %         Subjective         Pt reports his shoulder is feeling good. Doctor discontinued sling last week and reports no troubles.     Objective    S/p L RCR DOS: 2/21/2025.   Pt is 6wks, 3 days out as of 4/7/2025      Shoulder Passive Range of Motion: 4/7/2025  Shoulder Right Left   Flexion    170 140   ER at 0    80 50   ER at 90    Not tested 2/2 post-op Not tested 2/2 post-op   IR at 90    Not tested 2/2 post-op Not tested 2/2 post-op       Shoulder Active Range of Motion: 4/7/2025  Shoulder Right Left   Flexion   155 Pre-95  Post-115          Treatment     Ron received the treatments listed below:      therapeutic exercises to develop strength, endurance, ROM, flexibility, posture, and core stabilization for 15 minutes including:  Supine dowel AAROM ER 30x  Supine dowel AAROM chest press to overhead elevation 30x (narrow and wide)  Arm bike for tissue extensibility 4 min fwd/ 4min bck    NT:  Wrist [id number]  Elbow arom 20x(pro, elke, sup)  Pendelum hangs 1 min 3x  manual therapy techniques: Joint mobilizations, Manual traction, Myofacial release, Manual Lymphatic Drainage, Soft tissue Mobilization, and Friction  Massage were applied to the: shoulder for 15 minutes, including:  Grade I/II post/inferior mobs  Gentle oscillations  reassessment    neuromuscular re-education activities to improve: Balance, Coordination, Kinesthetic, Sense, Proprioception, and Posture for 30 minutes. The following activities were included:  Sidelying dowel assist flexion 2x15  ER walkout yellow TB 2x15  IR walkout yellow TB 2x15    NT:  Shoulder shrugs 5 sec holds 30x  therapeutic activities to improve functional performance for 0  minutes, including:       Assessment & Plan   Assessment:     Pt presents with good continued carryover in ROM. Continued mobility for ER and AAROM into flexion. Pt presents with 95 degrees AROM flexion, which improves to 115 following manual therapy and AAROM exercises. Passive ER at 0 is 50 degrees, which meets staged ROM goals. Continued with light ER/IR activation. Good compliance with patient's HEP. Will continue to progress per protocol.    Achieved staged ROM goals upon arrival. Initiated sidelying dowel assist flexion for progressed aarom into flexion. Pt tolerated the progression well with appropriate levels of fatigue. Required min verbal cues for shoulder shrug compensatory pattern..  Patient will continue to benefit from skilled outpatient physical therapy to address the deficits listed in the problem list box on initial evaluation, provide pt/family education and to maximize pt's level of independence in the home and community environment.     Patient's spiritual, cultural, and educational needs considered and patient agreeable to plan of care and goals.           Plan:  Continue POC    Goals:   Active       Long Term Goals       Pt will improve FOTO score to </=see FOTO% limited to decrease perceived limitation with carrying, moving, and handling objects.       Start:  02/24/25    Expected End:  08/11/25            Pt will increase  shoulder AROM to WFL in all planes to improve functional use of R RUE.        Start:  02/24/25    Expected End:  08/11/25            Pt will improve  shoulder MMTs to = 4+/5 to promote equal use of B UEs in performing functional tasks.       Start:  02/24/25    Expected End:  08/11/25            Pt will lift 10 lb objects without pain to promote functional QOL.       Start:  02/24/25    Expected End:  08/11/25            Pt to report pain </= 0/10       Start:  02/24/25    Expected End:  08/11/25               Short Term Goals        Pt will be independent with HEP to supplement PT in improving functional use of L UE.       Start:  02/24/25    Expected End:  03/24/25            Pt will increase pain free shoulder elevation PROM to >/= 160 deg to improve functional mobility of UE       Start:  02/24/25    Expected End:  03/24/25            Pt will increase shoulder ER PROM in 90 deg abduction to >/=20 deg to improve functional mobility of UE       Start:  02/24/25    Expected End:  03/24/25            Pt will increase elbow AAROM to WNL deg in 4 weeks to improve functional mobility of UE.       Start:  02/24/25    Expected End:  03/24/25                Delfino Quan, PT DPT

## 2025-04-11 ENCOUNTER — CLINICAL SUPPORT (OUTPATIENT)
Dept: REHABILITATION | Facility: HOSPITAL | Age: 70
End: 2025-04-11
Payer: MEDICARE

## 2025-04-11 DIAGNOSIS — M25.612 DECREASED RANGE OF MOTION OF LEFT SHOULDER: Primary | ICD-10-CM

## 2025-04-11 DIAGNOSIS — R29.898 DECREASED STRENGTH OF UPPER EXTREMITY: ICD-10-CM

## 2025-04-11 PROCEDURE — 97110 THERAPEUTIC EXERCISES: CPT | Mod: HCNC

## 2025-04-11 PROCEDURE — 97112 NEUROMUSCULAR REEDUCATION: CPT | Mod: HCNC

## 2025-04-11 NOTE — PROGRESS NOTES
Outpatient Rehab    Physical Therapy Visit    Patient Name: Ron Christina  MRN: [id number]  YOB: 1955  Encounter Date: 3/17/2025    Therapy Diagnosis:   Encounter Diagnoses   Name Primary?    Decreased range of motion of left shoulder Yes    Decreased strength of upper extremity          Physician: Alma Hathaway*    Physician Orders: Eval and Treat    Medical Diagnosis: Traumatic complete tear of left rotator cuff, initial encounter [S46.012A]      Visit # / Visits Authorized:  10 / 20   Date of Evaluation:  2/24/2025   Insurance Authorization Period: 2/11/2025 to 2/11/2026  Plan of Care Certification:  2/24/2025 to 5/19/2025      Time In:   8:40am  Time Out:  9:40am  Total Time:   60  Total Billable Time: 60    FOTO:  Intake Score:  %  Survey Score 1:  %  Survey Score 2:  %         Subjective         Pt reports his shoulder is feeling good. Doctor discontinued sling last week and reports no troubles.     Objective    S/p L RCR DOS: 2/21/2025.   Pt is 7wks, 0 days out as of 4/7/2025      Shoulder Passive Range of Motion: 4/10/2025  Shoulder Right Left   Flexion    170 Pre- 135  Post-149   ER at 0    80 50 free and easy   ER at 90    Not tested 2/2 post-op Not tested 2/2 post-op   IR at 90    Not tested 2/2 post-op Not tested 2/2 post-op       Shoulder Active Range of Motion: 4/7/2025  Shoulder Right Left   Flexion   155 Pre-115  Post-125          Treatment     Ron received the treatments listed below:      therapeutic exercises to develop strength, endurance, ROM, flexibility, posture, and core stabilization for 15 minutes including:  Supine dowel AAROM ER 30x  Supine dowel AAROM chest press to overhead elevation 30x (narrow and wide)  Arm bike for tissue extensibility 4 min fwd/ 4min bck    NT:  Wrist [id number]  Elbow arom 20x(pro, elke, sup)  Pendelum hangs 1 min 3x  manual therapy techniques: Joint mobilizations, Manual traction, Myofacial release, Manual Lymphatic Drainage, Soft  tissue Mobilization, and Friction Massage were applied to the: shoulder for 15 minutes, including:  Grade I/II post/inferior mobs  Gentle oscillations  reassessment    neuromuscular re-education activities to improve: Balance, Coordination, Kinesthetic, Sense, Proprioception, and Posture for 30 minutes. The following activities were included:  Sidelying dowel assist flexion 2x15  ER walkout yellow TB 3x15  IR walkout yellow TB 3x15    NT:  Shoulder shrugs 5 sec holds 30x  therapeutic activities to improve functional performance for 0  minutes, including:       Assessment & Plan   Assessment:    Pt presents with good continued carryover in ROM. Has 115/135 AROM/PROM flexion upon entry and improves to 125/149 following manual therapy. Progressed amount of sets on light ER/IR walkouts, which pt tolerated well. Good compliance with patient's HEP. Will continue to progress per protocol.       Patient will continue to benefit from skilled outpatient physical therapy to address the deficits listed in the problem list box on initial evaluation, provide pt/family education and to maximize pt's level of independence in the home and community environment.     Patient's spiritual, cultural, and educational needs considered and patient agreeable to plan of care and goals.           Plan:  Continue POC    Goals:   Active       Long Term Goals       Pt will improve FOTO score to </=see FOTO% limited to decrease perceived limitation with carrying, moving, and handling objects.       Start:  02/24/25    Expected End:  08/11/25            Pt will increase  shoulder AROM to WFL in all planes to improve functional use of R RUE.       Start:  02/24/25    Expected End:  08/11/25            Pt will improve  shoulder MMTs to = 4+/5 to promote equal use of B UEs in performing functional tasks.       Start:  02/24/25    Expected End:  08/11/25            Pt will lift 10 lb objects without pain to promote functional QOL.       Start:   02/24/25    Expected End:  08/11/25            Pt to report pain </= 0/10       Start:  02/24/25    Expected End:  08/11/25               Short Term Goals        Pt will be independent with HEP to supplement PT in improving functional use of L UE.       Start:  02/24/25    Expected End:  03/24/25            Pt will increase pain free shoulder elevation PROM to >/= 160 deg to improve functional mobility of UE       Start:  02/24/25    Expected End:  03/24/25            Pt will increase shoulder ER PROM in 90 deg abduction to >/=20 deg to improve functional mobility of UE       Start:  02/24/25    Expected End:  03/24/25            Pt will increase elbow AAROM to WNL deg in 4 weeks to improve functional mobility of UE.       Start:  02/24/25    Expected End:  03/24/25                Delfino Quan, PT DPT

## 2025-04-11 NOTE — PROGRESS NOTES
Outpatient Rehab    Physical Therapy Visit    Patient Name: Ron Christina  MRN: [id number]  YOB: 1955  Encounter Date: 3/17/2025    Therapy Diagnosis:   Encounter Diagnoses   Name Primary?    Decreased range of motion of left shoulder Yes    Decreased strength of upper extremity          Physician: Alma Hathaway*    Physician Orders: Eval and Treat    Medical Diagnosis: Traumatic complete tear of left rotator cuff, initial encounter [S46.012A]      Visit # / Visits Authorized:  10 / 20   Date of Evaluation:  2/24/2025   Insurance Authorization Period: 2/11/2025 to 2/11/2026  Plan of Care Certification:  2/24/2025 to 5/19/2025      Time In:   8:40am  Time Out:  9:40am  Total Time:   60  Total Billable Time: 60    FOTO:  Intake Score:  %  Survey Score 1:  %  Survey Score 2:  %         Subjective         Pt reports his shoulder is feeling good. Doctor discontinued sling last week and reports no troubles.     Objective    S/p L RCR DOS: 2/21/2025.   Pt is 7wks, 0 days out as of 4/7/2025      Shoulder Passive Range of Motion: 4/10/2025  Shoulder Right Left   Flexion    170 Pre- 135  Post-149   ER at 0    80 50 free and easy   ER at 90    Not tested 2/2 post-op Not tested 2/2 post-op   IR at 90    Not tested 2/2 post-op Not tested 2/2 post-op       Shoulder Active Range of Motion: 4/7/2025  Shoulder Right Left   Flexion   155 Pre-115  Post-125          Treatment     Ron received the treatments listed below:      therapeutic exercises to develop strength, endurance, ROM, flexibility, posture, and core stabilization for 15 minutes including:  Supine dowel AAROM ER 30x  Supine dowel AAROM chest press to overhead elevation 30x (narrow and wide)  Arm bike for tissue extensibility 4 min fwd/ 4min bck    NT:  Wrist [id number]  Elbow arom 20x(pro, elke, sup)  Pendelum hangs 1 min 3x  manual therapy techniques: Joint mobilizations, Manual traction, Myofacial release, Manual Lymphatic Drainage, Soft  tissue Mobilization, and Friction Massage were applied to the: shoulder for 15 minutes, including:  Grade I/II post/inferior mobs  Gentle oscillations  reassessment    neuromuscular re-education activities to improve: Balance, Coordination, Kinesthetic, Sense, Proprioception, and Posture for 30 minutes. The following activities were included:  Sidelying dowel assist flexion 2x15  ER walkout yellow TB 3x15  IR walkout yellow TB 3x15    NT:  Shoulder shrugs 5 sec holds 30x  therapeutic activities to improve functional performance for 0  minutes, including:       Assessment & Plan   Assessment:    Pt presents with good continued carryover in ROM. Has 115/135 AROM/PROM flexion upon entry and improves to 125/149 following manual therapy. Progressed amount of sets on light ER/IR walkouts, which pt tolerated well. Good compliance with patient's HEP. Will continue to progress per protocol.       Patient will continue to benefit from skilled outpatient physical therapy to address the deficits listed in the problem list box on initial evaluation, provide pt/family education and to maximize pt's level of independence in the home and community environment.     Patient's spiritual, cultural, and educational needs considered and patient agreeable to plan of care and goals.           Plan:  Continue POC    Goals:   Active       Long Term Goals       Pt will improve FOTO score to </=see FOTO% limited to decrease perceived limitation with carrying, moving, and handling objects.       Start:  02/24/25    Expected End:  08/11/25            Pt will increase  shoulder AROM to WFL in all planes to improve functional use of R RUE.       Start:  02/24/25    Expected End:  08/11/25            Pt will improve  shoulder MMTs to = 4+/5 to promote equal use of B UEs in performing functional tasks.       Start:  02/24/25    Expected End:  08/11/25            Pt will lift 10 lb objects without pain to promote functional QOL.       Start:   02/24/25    Expected End:  08/11/25            Pt to report pain </= 0/10       Start:  02/24/25    Expected End:  08/11/25               Short Term Goals        Pt will be independent with HEP to supplement PT in improving functional use of L UE.       Start:  02/24/25    Expected End:  03/24/25            Pt will increase pain free shoulder elevation PROM to >/= 160 deg to improve functional mobility of UE       Start:  02/24/25    Expected End:  03/24/25            Pt will increase shoulder ER PROM in 90 deg abduction to >/=20 deg to improve functional mobility of UE       Start:  02/24/25    Expected End:  03/24/25            Pt will increase elbow AAROM to WNL deg in 4 weeks to improve functional mobility of UE.       Start:  02/24/25    Expected End:  03/24/25                Delfino Quan, PT DPT

## 2025-04-14 ENCOUNTER — CLINICAL SUPPORT (OUTPATIENT)
Dept: REHABILITATION | Facility: HOSPITAL | Age: 70
End: 2025-04-14
Payer: MEDICARE

## 2025-04-14 DIAGNOSIS — R29.898 DECREASED STRENGTH OF UPPER EXTREMITY: ICD-10-CM

## 2025-04-14 DIAGNOSIS — M25.612 DECREASED RANGE OF MOTION OF LEFT SHOULDER: Primary | ICD-10-CM

## 2025-04-14 PROCEDURE — 97140 MANUAL THERAPY 1/> REGIONS: CPT | Mod: HCNC

## 2025-04-14 PROCEDURE — 97110 THERAPEUTIC EXERCISES: CPT | Mod: HCNC

## 2025-04-14 PROCEDURE — 97112 NEUROMUSCULAR REEDUCATION: CPT | Mod: HCNC

## 2025-04-14 NOTE — PROGRESS NOTES
Outpatient Rehab    Physical Therapy Visit    Patient Name: Ron Christina  MRN: [id number]  YOB: 1955  Encounter Date: 3/17/2025    Therapy Diagnosis:   Encounter Diagnoses   Name Primary?    Decreased range of motion of left shoulder Yes    Decreased strength of upper extremity          Physician: Alma Hathaway*    Physician Orders: Eval and Treat    Medical Diagnosis: Traumatic complete tear of left rotator cuff, initial encounter [S46.012A]      Visit # / Visits Authorized:  14 / 20   Date of Evaluation:  2/24/2025   Insurance Authorization Period: 2/11/2025 to 2/11/2026  Plan of Care Certification:  2/24/2025 to 5/19/2025      Time In:   8:40am  Time Out:  9:40am  Total Time:   60  Total Billable Time: 60    FOTO:  Intake Score:  %  Survey Score 1:  %  Survey Score 2:  %         Subjective         Pt reports his shoulder is feeling good. Pt had some muscle soreness in his shoulder after last visit that lasted about a day. Using his ice machine helps decrease soreness.     Objective    S/p L RCR DOS: 2/21/2025.   Pt is 7wks, 3 days out as of 4/14/2025      Shoulder Passive Range of Motion: 4/10/2025  Shoulder Right Left   Flexion    170 Pre- 135  Post-149   ER at 0    80 50 free and easy   ER at 90    Not tested 2/2 post-op Not tested 2/2 post-op   IR at 90    Not tested 2/2 post-op Not tested 2/2 post-op       Shoulder Active Range of Motion: 4/14/2025  Shoulder Right Left   Flexion   155 120          Treatment     Ron received the treatments listed below:      therapeutic exercises to develop strength, endurance, ROM, flexibility, posture, and core stabilization for 15 minutes including:  Supine dowel AAROM ER 30x  Supine dowel AAROM chest press to overhead elevation 30x (narrow and wide)  Arm bike for tissue extensibility 4 min fwd/ 4min bck    NT:  Wrist [id number]  Elbow arom 20x(pro, elke, sup)  Pendelum hangs 1 min 3x  manual therapy techniques: Joint mobilizations, Manual  traction, Myofacial release, Manual Lymphatic Drainage, Soft tissue Mobilization, and Friction Massage were applied to the: shoulder for 15 minutes, including:  Grade I/II post/inferior mobs  Gentle oscillations  reassessment    neuromuscular re-education activities to improve: Balance, Coordination, Kinesthetic, Sense, Proprioception, and Posture for 30 minutes. The following activities were included:  Sidelying dowel assist flexion 2x15  ER walkout yellow TB 3x15  IR walkout yellow TB 3x15  SL ER 0# 2x10    NT:  Shoulder shrugs 5 sec holds 30x  therapeutic activities to improve functional performance for 0  minutes, including:       Assessment & Plan   Assessment:    Pt presents with good continued carryover in ROM. Pt has reached staged ROM goals. Initiated SL ER AROM today to improve RTC activation and pt demonstrated good scapular and shoulder mechanics. Good compliance with patient's HEP. Will continue to progress per protocol.       Patient will continue to benefit from skilled outpatient physical therapy to address the deficits listed in the problem list box on initial evaluation, provide pt/family education and to maximize pt's level of independence in the home and community environment.     Patient's spiritual, cultural, and educational needs considered and patient agreeable to plan of care and goals.           Plan:  Continue POC    Goals:   Active       Long Term Goals       Pt will improve FOTO score to </=see FOTO% limited to decrease perceived limitation with carrying, moving, and handling objects.       Start:  02/24/25    Expected End:  08/11/25            Pt will increase  shoulder AROM to WFL in all planes to improve functional use of R RUE.       Start:  02/24/25    Expected End:  08/11/25            Pt will improve  shoulder MMTs to = 4+/5 to promote equal use of B UEs in performing functional tasks.       Start:  02/24/25    Expected End:  08/11/25            Pt will lift 10 lb objects without  pain to promote functional QOL.       Start:  02/24/25    Expected End:  08/11/25            Pt to report pain </= 0/10       Start:  02/24/25    Expected End:  08/11/25               Short Term Goals        Pt will be independent with HEP to supplement PT in improving functional use of L UE.       Start:  02/24/25    Expected End:  03/24/25            Pt will increase pain free shoulder elevation PROM to >/= 160 deg to improve functional mobility of UE       Start:  02/24/25    Expected End:  03/24/25            Pt will increase shoulder ER PROM in 90 deg abduction to >/=20 deg to improve functional mobility of UE       Start:  02/24/25    Expected End:  03/24/25            Pt will increase elbow AAROM to WNL deg in 4 weeks to improve functional mobility of UE.       Start:  02/24/25    Expected End:  03/24/25                Delfino Quan, PT DPT

## 2025-04-14 NOTE — PROGRESS NOTES
Outpatient Rehab    Physical Therapy Visit    Patient Name: Ron Christina  MRN: [id number]  YOB: 1955  Encounter Date: 3/17/2025    Therapy Diagnosis:   Encounter Diagnoses   Name Primary?    Decreased range of motion of left shoulder Yes    Decreased strength of upper extremity          Physician: Alma Hathaway*    Physician Orders: Eval and Treat    Medical Diagnosis: Traumatic complete tear of left rotator cuff, initial encounter [S46.012A]      Visit # / Visits Authorized:  14 / 20   Date of Evaluation:  2/24/2025   Insurance Authorization Period: 2/11/2025 to 2/11/2026  Plan of Care Certification:  2/24/2025 to 5/19/2025      Time In:   8:40am  Time Out:  9:40am  Total Time:   60  Total Billable Time: 60    FOTO:  Intake Score:  %  Survey Score 1:  %  Survey Score 2:  %         Subjective         Pt reports his shoulder is feeling good. Pt had some muscle soreness in his shoulder after last visit that lasted about a day. Using his ice machine helps decrease soreness.     Objective    S/p L RCR DOS: 2/21/2025.   Pt is 7wks, 3 days out as of 4/14/2025      Shoulder Passive Range of Motion: 4/10/2025  Shoulder Right Left   Flexion    170 Pre- 135  Post-149   ER at 0    80 50 free and easy   ER at 90    Not tested 2/2 post-op Not tested 2/2 post-op   IR at 90    Not tested 2/2 post-op Not tested 2/2 post-op       Shoulder Active Range of Motion: 4/14/2025  Shoulder Right Left   Flexion   155 120          Treatment     Ron received the treatments listed below:      therapeutic exercises to develop strength, endurance, ROM, flexibility, posture, and core stabilization for 15 minutes including:  Supine dowel AAROM ER 30x  Supine dowel AAROM chest press to overhead elevation 30x (narrow and wide)  Arm bike for tissue extensibility 4 min fwd/ 4min bck    NT:  Wrist [id number]  Elbow arom 20x(pro, elke, sup)  Pendelum hangs 1 min 3x  manual therapy techniques: Joint mobilizations, Manual  traction, Myofacial release, Manual Lymphatic Drainage, Soft tissue Mobilization, and Friction Massage were applied to the: shoulder for 15 minutes, including:  Grade I/II post/inferior mobs  Gentle oscillations  reassessment    neuromuscular re-education activities to improve: Balance, Coordination, Kinesthetic, Sense, Proprioception, and Posture for 30 minutes. The following activities were included:  Sidelying dowel assist flexion 2x15  ER walkout yellow TB 3x15  IR walkout yellow TB 3x15  SL ER 0# 2x10    NT:  Shoulder shrugs 5 sec holds 30x  therapeutic activities to improve functional performance for 0  minutes, including:       Assessment & Plan   Assessment:    Pt presents with good continued carryover in ROM. Pt has reached staged ROM goals. Initiated SL ER AROM today to improve RTC activation and pt demonstrated good scapular and shoulder mechanics. Good compliance with patient's HEP. Will continue to progress per protocol.       Patient will continue to benefit from skilled outpatient physical therapy to address the deficits listed in the problem list box on initial evaluation, provide pt/family education and to maximize pt's level of independence in the home and community environment.     Patient's spiritual, cultural, and educational needs considered and patient agreeable to plan of care and goals.           Plan:  Continue POC    Goals:   Active       Long Term Goals       Pt will improve FOTO score to </=see FOTO% limited to decrease perceived limitation with carrying, moving, and handling objects.       Start:  02/24/25    Expected End:  08/11/25            Pt will increase  shoulder AROM to WFL in all planes to improve functional use of R RUE.       Start:  02/24/25    Expected End:  08/11/25            Pt will improve  shoulder MMTs to = 4+/5 to promote equal use of B UEs in performing functional tasks.       Start:  02/24/25    Expected End:  08/11/25            Pt will lift 10 lb objects without  pain to promote functional QOL.       Start:  02/24/25    Expected End:  08/11/25            Pt to report pain </= 0/10       Start:  02/24/25    Expected End:  08/11/25               Short Term Goals        Pt will be independent with HEP to supplement PT in improving functional use of L UE.       Start:  02/24/25    Expected End:  03/24/25            Pt will increase pain free shoulder elevation PROM to >/= 160 deg to improve functional mobility of UE       Start:  02/24/25    Expected End:  03/24/25            Pt will increase shoulder ER PROM in 90 deg abduction to >/=20 deg to improve functional mobility of UE       Start:  02/24/25    Expected End:  03/24/25            Pt will increase elbow AAROM to WNL deg in 4 weeks to improve functional mobility of UE.       Start:  02/24/25    Expected End:  03/24/25                Delfino Quan, PT DPT

## 2025-04-21 ENCOUNTER — CLINICAL SUPPORT (OUTPATIENT)
Dept: REHABILITATION | Facility: HOSPITAL | Age: 70
End: 2025-04-21
Payer: MEDICARE

## 2025-04-21 DIAGNOSIS — R29.898 DECREASED STRENGTH OF UPPER EXTREMITY: ICD-10-CM

## 2025-04-21 DIAGNOSIS — M25.612 DECREASED RANGE OF MOTION OF LEFT SHOULDER: Primary | ICD-10-CM

## 2025-04-21 PROCEDURE — 97110 THERAPEUTIC EXERCISES: CPT | Mod: HCNC

## 2025-04-21 PROCEDURE — 97140 MANUAL THERAPY 1/> REGIONS: CPT | Mod: HCNC

## 2025-04-21 PROCEDURE — 97112 NEUROMUSCULAR REEDUCATION: CPT | Mod: HCNC

## 2025-04-21 NOTE — PROGRESS NOTES
Outpatient Rehab    Physical Therapy Visit    Patient Name: Ron Christina  MRN: [id number]  YOB: 1955  Encounter Date: 3/17/2025    Therapy Diagnosis:   Encounter Diagnoses   Name Primary?    Decreased range of motion of left shoulder Yes    Decreased strength of upper extremity          Physician: Alma Hathaway*    Physician Orders: Eval and Treat    Medical Diagnosis: Traumatic complete tear of left rotator cuff, initial encounter [S46.012A]      Visit # / Visits Authorized:  15 / 20   Date of Evaluation:  2/24/2025   Insurance Authorization Period: 2/11/2025 to 2/11/2026  Plan of Care Certification:  2/24/2025 to 5/19/2025      Time In:   8:45am  Time Out:  9:45am  Total Time:   60  Total Billable Time: 60    FOTO:  Intake Score:  %  Survey Score 1:  %  Survey Score 2:  %         Subjective         Pt reports his shoulder is feeling good. Had some light shoulder muscle soreness for about a day after last visit.   Objective    S/p L RCR DOS: 2/21/2025.   Pt is 8wks, 3 days out as of 4/21/2025      Shoulder Passive Range of Motion: 4/21/2025  Shoulder Right Left   Flexion    170 150   ER at 0    80 50 free and easy   ER at 90    Not tested 2/2 post-op Not tested 2/2 post-op   IR at 90    Not tested 2/2 post-op Not tested 2/2 post-op       Shoulder Active Range of Motion: 4/21/2025  Shoulder Right Left   Flexion   155 120          Treatment     Ron received the treatments listed below:      therapeutic exercises to develop strength, endurance, ROM, flexibility, posture, and core stabilization for 15 minutes including:  Supine dowel AAROM ER 30x  Supine dowel AAROM chest press to overhead elevation 30x (narrow and wide)  Arm bike for tissue extensibility 4 min fwd/ 4min bck    NT:  Wrist [id number]  Elbow arom 20x(pro, elke, sup)  Pendelum hangs 1 min 3x  manual therapy techniques: Joint mobilizations, Manual traction, Myofacial release, Manual Lymphatic Drainage, Soft tissue  Mobilization, and Friction Massage were applied to the: shoulder for 15 minutes, including:  Grade I/II post/inferior mobs  Gentle oscillations  Lat contract/relax  reassessment    neuromuscular re-education activities to improve: Balance, Coordination, Kinesthetic, Sense, Proprioception, and Posture for 30 minutes. The following activities were included:  Sidelying dowel assist flexion 2x15  ER walkout OTB 3x15  IR walkout OTB 3x15  SL ER 0# 2x10  Landmine dowel press 2x15    NT:  Shoulder shrugs 5 sec holds 30x  therapeutic activities to improve functional performance for 0  minutes, including:       Assessment & Plan   Assessment:    Pt presents with good continued carryover in ROM. Pt has reached staged ROM goals. Initiated landmine dowel press to help maintain active flexion ROM. Mod verbal and tactile cues provided for preventing shrug and keeping elbow in. Progressed resistance on ER/IR walkouts, which pt tolerated well with good mechanics. Good compliance with patient's HEP. Will continue to progress per protocol.       Patient will continue to benefit from skilled outpatient physical therapy to address the deficits listed in the problem list box on initial evaluation, provide pt/family education and to maximize pt's level of independence in the home and community environment.     Patient's spiritual, cultural, and educational needs considered and patient agreeable to plan of care and goals.           Plan:  Continue POC    Goals:   Active       Long Term Goals       Pt will improve FOTO score to </=see FOTO% limited to decrease perceived limitation with carrying, moving, and handling objects.       Start:  02/24/25    Expected End:  08/11/25            Pt will increase  shoulder AROM to WFL in all planes to improve functional use of R RUE.       Start:  02/24/25    Expected End:  08/11/25            Pt will improve  shoulder MMTs to = 4+/5 to promote equal use of B UEs in performing functional tasks.        Start:  02/24/25    Expected End:  08/11/25            Pt will lift 10 lb objects without pain to promote functional QOL.       Start:  02/24/25    Expected End:  08/11/25            Pt to report pain </= 0/10       Start:  02/24/25    Expected End:  08/11/25               Short Term Goals        Pt will be independent with HEP to supplement PT in improving functional use of L UE.       Start:  02/24/25    Expected End:  03/24/25            Pt will increase pain free shoulder elevation PROM to >/= 160 deg to improve functional mobility of UE       Start:  02/24/25    Expected End:  03/24/25            Pt will increase shoulder ER PROM in 90 deg abduction to >/=20 deg to improve functional mobility of UE       Start:  02/24/25    Expected End:  03/24/25            Pt will increase elbow AAROM to WNL deg in 4 weeks to improve functional mobility of UE.       Start:  02/24/25    Expected End:  03/24/25                Delfino Quan, PT DPT

## 2025-04-21 NOTE — PROGRESS NOTES
Outpatient Rehab    Physical Therapy Visit    Patient Name: Ron Christina  MRN: [id number]  YOB: 1955  Encounter Date: 3/17/2025    Therapy Diagnosis:   Encounter Diagnoses   Name Primary?    Decreased range of motion of left shoulder Yes    Decreased strength of upper extremity          Physician: Alma Hathaway*    Physician Orders: Eval and Treat    Medical Diagnosis: Traumatic complete tear of left rotator cuff, initial encounter [S46.012A]      Visit # / Visits Authorized:  15 / 20   Date of Evaluation:  2/24/2025   Insurance Authorization Period: 2/11/2025 to 2/11/2026  Plan of Care Certification:  2/24/2025 to 5/19/2025      Time In:   8:45am  Time Out:  9:45am  Total Time:   60  Total Billable Time: 60    FOTO:  Intake Score:  %  Survey Score 1:  %  Survey Score 2:  %         Subjective         Pt reports his shoulder is feeling good. Had some light shoulder muscle soreness for about a day after last visit.   Objective    S/p L RCR DOS: 2/21/2025.   Pt is 8wks, 3 days out as of 4/21/2025      Shoulder Passive Range of Motion: 4/21/2025  Shoulder Right Left   Flexion    170 150   ER at 0    80 50 free and easy   ER at 90    Not tested 2/2 post-op Not tested 2/2 post-op   IR at 90    Not tested 2/2 post-op Not tested 2/2 post-op       Shoulder Active Range of Motion: 4/21/2025  Shoulder Right Left   Flexion   155 120          Treatment     Ron received the treatments listed below:      therapeutic exercises to develop strength, endurance, ROM, flexibility, posture, and core stabilization for 15 minutes including:  Supine dowel AAROM ER 30x  Supine dowel AAROM chest press to overhead elevation 30x (narrow and wide)  Arm bike for tissue extensibility 4 min fwd/ 4min bck    NT:  Wrist [id number]  Elbow arom 20x(pro, elke, sup)  Pendelum hangs 1 min 3x  manual therapy techniques: Joint mobilizations, Manual traction, Myofacial release, Manual Lymphatic Drainage, Soft tissue  I went in to check on this patient and she said that her hand is feeling much better, mobility is fine, redness has receded.  No fevers.  She is taking the antibiotics and not having any significant side effects.  She is comfortable with continuation of the antibiotic.  She wasn't sure if she really needed to come in since it was doing some better.  She will follow-up if any worsening.   Mobilization, and Friction Massage were applied to the: shoulder for 15 minutes, including:  Grade I/II post/inferior mobs  Gentle oscillations  Lat contract/relax  reassessment    neuromuscular re-education activities to improve: Balance, Coordination, Kinesthetic, Sense, Proprioception, and Posture for 30 minutes. The following activities were included:  Sidelying dowel assist flexion 2x15  ER walkout OTB 3x15  IR walkout OTB 3x15  SL ER 0# 2x10  Landmine dowel press 2x15    NT:  Shoulder shrugs 5 sec holds 30x  therapeutic activities to improve functional performance for 0  minutes, including:       Assessment & Plan   Assessment:    Pt presents with good continued carryover in ROM. Pt has reached staged ROM goals. Initiated landmine dowel press to help maintain active flexion ROM. Mod verbal and tactile cues provided for preventing shrug and keeping elbow in. Progressed resistance on ER/IR walkouts, which pt tolerated well with good mechanics. Good compliance with patient's HEP. Will continue to progress per protocol.       Patient will continue to benefit from skilled outpatient physical therapy to address the deficits listed in the problem list box on initial evaluation, provide pt/family education and to maximize pt's level of independence in the home and community environment.     Patient's spiritual, cultural, and educational needs considered and patient agreeable to plan of care and goals.           Plan:  Continue POC    Goals:   Active       Long Term Goals       Pt will improve FOTO score to </=see FOTO% limited to decrease perceived limitation with carrying, moving, and handling objects.       Start:  02/24/25    Expected End:  08/11/25            Pt will increase  shoulder AROM to WFL in all planes to improve functional use of R RUE.       Start:  02/24/25    Expected End:  08/11/25            Pt will improve  shoulder MMTs to = 4+/5 to promote equal use of B UEs in performing functional tasks.        Start:  02/24/25    Expected End:  08/11/25            Pt will lift 10 lb objects without pain to promote functional QOL.       Start:  02/24/25    Expected End:  08/11/25            Pt to report pain </= 0/10       Start:  02/24/25    Expected End:  08/11/25               Short Term Goals        Pt will be independent with HEP to supplement PT in improving functional use of L UE.       Start:  02/24/25    Expected End:  03/24/25            Pt will increase pain free shoulder elevation PROM to >/= 160 deg to improve functional mobility of UE       Start:  02/24/25    Expected End:  03/24/25            Pt will increase shoulder ER PROM in 90 deg abduction to >/=20 deg to improve functional mobility of UE       Start:  02/24/25    Expected End:  03/24/25            Pt will increase elbow AAROM to WNL deg in 4 weeks to improve functional mobility of UE.       Start:  02/24/25    Expected End:  03/24/25                Delfino Quan, PT DPT

## 2025-04-24 ENCOUNTER — CLINICAL SUPPORT (OUTPATIENT)
Dept: REHABILITATION | Facility: HOSPITAL | Age: 70
End: 2025-04-24
Payer: MEDICARE

## 2025-04-24 DIAGNOSIS — M25.612 DECREASED RANGE OF MOTION OF LEFT SHOULDER: Primary | ICD-10-CM

## 2025-04-24 DIAGNOSIS — R29.898 DECREASED STRENGTH OF UPPER EXTREMITY: ICD-10-CM

## 2025-04-24 PROCEDURE — 97140 MANUAL THERAPY 1/> REGIONS: CPT | Mod: HCNC

## 2025-04-24 PROCEDURE — 97112 NEUROMUSCULAR REEDUCATION: CPT | Mod: HCNC

## 2025-04-24 PROCEDURE — 97110 THERAPEUTIC EXERCISES: CPT | Mod: HCNC

## 2025-04-24 NOTE — PROGRESS NOTES
Outpatient Rehab    Physical Therapy Visit    Patient Name: Ron Christina  MRN: [id number]  YOB: 1955  Encounter Date: 3/17/2025    Therapy Diagnosis:   Encounter Diagnoses   Name Primary?    Decreased range of motion of left shoulder Yes    Decreased strength of upper extremity          Physician: Alma Hathaway*    Physician Orders: Eval and Treat    Medical Diagnosis: Traumatic complete tear of left rotator cuff, initial encounter [S46.012A]      Visit # / Visits Authorized:  16 / 20   Date of Evaluation:  2/24/2025   Insurance Authorization Period: 2/11/2025 to 2/11/2026  Plan of Care Certification:  2/24/2025 to 5/19/2025      Time In:   7:45am  Time Out:  8:45am  Total Time:   60  Total Billable Time: 60    FOTO:  Intake Score:  %  Survey Score 1:  %  Survey Score 2:  %         Subjective         Pt reports his shoulder is a little tight this morning. 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   Pt is 8wks, 3 days out as of 4/21/2025      Shoulder Passive Range of Motion: 4/24/2025  Shoulder Right Left   Flexion    170 Pre-120  Post-140   ER at 0    80 50 free and easy   ER at 90    Not tested 2/2 post-op Not tested 2/2 post-op   IR at 90    Not tested 2/2 post-op Not tested 2/2 post-op       Shoulder Active Range of Motion: 4/21/2025  Shoulder Right Left   Flexion   155 120          Treatment     Ron received the treatments listed below:      therapeutic exercises to develop strength, endurance, ROM, flexibility, posture, and core stabilization for 15 minutes including:  Supine dowel AAROM ER 30x  Supine dowel AAROM chest press to overhead elevation 30x (narrow and wide)  Arm bike for tissue extensibility 4 min fwd/ 4min bck    NT:  Wrist [id number]  Elbow arom 20x(pro, elke, sup)  Pendelum hangs 1 min 3x  manual therapy techniques: Joint mobilizations, Manual traction, Myofacial  release, Manual Lymphatic Drainage, Soft tissue Mobilization, and Friction Massage were applied to the: shoulder for 15 minutes, including:  Grade I/II post/inferior mobs  Gentle oscillations  Lat contract/relax  reassessment    neuromuscular re-education activities to improve: Balance, Coordination, Kinesthetic, Sense, Proprioception, and Posture for 30 minutes. The following activities were included:  Sidelying dowel assist flexion 2x15-NT  ER walkout OTB 3x15  IR walkout OTB 3x15  SL ER 0# 2x15 5 sec holds  Landmine dowel press 2x15-NT  Pulleys w/ cueing for UT/LS control 3minx2    NT:  Shoulder shrugs 5 sec holds 30x  therapeutic activities to improve functional performance for 0  minutes, including:       Assessment & Plan   Assessment:    Pt presents with min continued carryover in ROM. Pt has reached staged ROM goals. Intro'd pulleys's for active assist elevation with mod cueing for UT/LS compensatory pattern. Also required mod verbal cues for Er walkouts. Appropriate muscle response with intervenitons and no adverse reactions. Plan to add wall slides next visit to progress elevation w/ HEP. Will continue to progress per protocol.       Patient will continue to benefit from skilled outpatient physical therapy to address the deficits listed in the problem list box on initial evaluation, provide pt/family education and to maximize pt's level of independence in the home and community environment.     Patient's spiritual, cultural, and educational needs considered and patient agreeable to plan of care and goals.           Plan:  Continue POC    Goals:   Active       Long Term Goals       Pt will improve FOTO score to </=see FOTO% limited to decrease perceived limitation with carrying, moving, and handling objects.       Start:  02/24/25    Expected End:  08/11/25            Pt will increase  shoulder AROM to WFL in all planes to improve functional use of R RUE.       Start:  02/24/25    Expected End:  08/11/25             Pt will improve  shoulder MMTs to = 4+/5 to promote equal use of B UEs in performing functional tasks.       Start:  02/24/25    Expected End:  08/11/25            Pt will lift 10 lb objects without pain to promote functional QOL.       Start:  02/24/25    Expected End:  08/11/25            Pt to report pain </= 0/10       Start:  02/24/25    Expected End:  08/11/25               Short Term Goals        Pt will be independent with HEP to supplement PT in improving functional use of L UE.       Start:  02/24/25    Expected End:  03/24/25            Pt will increase pain free shoulder elevation PROM to >/= 160 deg to improve functional mobility of UE       Start:  02/24/25    Expected End:  03/24/25            Pt will increase shoulder ER PROM in 90 deg abduction to >/=20 deg to improve functional mobility of UE       Start:  02/24/25    Expected End:  03/24/25            Pt will increase elbow AAROM to WNL deg in 4 weeks to improve functional mobility of UE.       Start:  02/24/25    Expected End:  03/24/25                Delfino Quan, PT DPT

## 2025-04-28 ENCOUNTER — TELEPHONE (OUTPATIENT)
Dept: OPHTHALMOLOGY | Facility: CLINIC | Age: 70
End: 2025-04-28
Payer: MEDICARE

## 2025-04-28 NOTE — TELEPHONE ENCOUNTER
----- Message from Shoshana sent at 4/28/2025 11:24 AM CDT -----  Type:  Sooner Apoointment RequestCaller is requesting a sooner appointment.  Name of Caller:Ron When is the first available appointment?soon Symptoms:burn eyes redness blurry vision Would the patient rather a call back or a response via [facility]? Call Best Call Back Number: [phone] Information:

## 2025-04-29 ENCOUNTER — CLINICAL SUPPORT (OUTPATIENT)
Dept: REHABILITATION | Facility: HOSPITAL | Age: 70
End: 2025-04-29
Payer: MEDICARE

## 2025-04-29 DIAGNOSIS — R29.898 DECREASED STRENGTH OF UPPER EXTREMITY: ICD-10-CM

## 2025-04-29 DIAGNOSIS — M25.612 DECREASED RANGE OF MOTION OF LEFT SHOULDER: Primary | ICD-10-CM

## 2025-04-29 PROCEDURE — 97140 MANUAL THERAPY 1/> REGIONS: CPT | Mod: HCNC

## 2025-04-29 PROCEDURE — 97112 NEUROMUSCULAR REEDUCATION: CPT | Mod: HCNC

## 2025-04-29 PROCEDURE — 97110 THERAPEUTIC EXERCISES: CPT | Mod: HCNC

## 2025-04-29 NOTE — PROGRESS NOTES
Outpatient Rehab    Physical Therapy Visit    Patient Name: Ron Christina  MRN: [id number]  YOB: 1955  Encounter Date: 3/17/2025    Therapy Diagnosis:   Encounter Diagnoses   Name Primary?    Decreased range of motion of left shoulder Yes    Decreased strength of upper extremity          Physician: Alma Hathaway*    Physician Orders: Eval and Treat    Medical Diagnosis: Traumatic complete tear of left rotator cuff, initial encounter [S46.012A]      Visit # / Visits Authorized:  17 / 30   Date of Evaluation:  2/24/2025   Insurance Authorization Period: 2/11/2025 to 2/11/2026  Plan of Care Certification:  2/24/2025 to 5/19/2025      Time In:   8:00am  Time Out:  9:00am  Total Time:   30 min  Total Billable Time: 30 min    FOTO:  Intake Score:  %  Survey Score 1:  %  Survey Score 2:  %         Subjective         Pt reports his shoulder was a little sore after last session. HEP is going well at home.   Objective    S/p L RCR DOS: 2/21/2025.   Pt is 9wks, 5 days out as of 4/28/2025      Shoulder Passive Range of Motion: 4/28/2025  Shoulder Right Left   Flexion    170 Pre-140  Post-155   ER at 0    80 65 free and easy   ER at 90    Not tested 2/2 post-op Not tested 2/2 post-op   IR at 90    Not tested 2/2 post-op Not tested 2/2 post-op       Shoulder Active Range of Motion: 4/28/2025  Shoulder Right Left   Flexion   155 Pre-130  Post-145          Treatment     Ron received the treatments listed below:      therapeutic exercises to develop strength, endurance, ROM, flexibility, posture, and core stabilization for 15 minutes including:  Supine dowel AAROM ER 30x  Supine dowel AAROM chest press to overhead elevation 30x (narrow and wide)  Arm bike for tissue extensibility 4 min fwd/ 4min bck    NT:  Wrist [id number]  Elbow arom 20x(pro, elke, sup)  Pendelum hangs 1 min 3x  manual therapy techniques: Joint mobilizations, Manual traction, Myofacial release, Manual Lymphatic Drainage, Soft tissue  Mobilization, and Friction Massage were applied to the: shoulder for 15 minutes, including:  Grade III post/inferior mobs  Gentle oscillations  Lat contract/relax  reassessment    neuromuscular re-education activities to improve: Balance, Coordination, Kinesthetic, Sense, Proprioception, and Posture for 30 minutes. The following activities were included:  ER walkout OTB 3x15  IR walkout GTB 3x10  Wall slides 3x10    NT:  Shoulder shrugs 5 sec holds 30x  Sidelying dowel assist flexion 2x15  SL ER 0# 2x15 5 sec holds  Landmine dowel press 2x15  Pulleys w/ cueing for UT/LS control 3minx2  therapeutic activities to improve functional performance for 0  minutes, including:       Assessment & Plan   Assessment:    Pt presents with good carryover in ROM and improvement in active flexion from 130-145 pre/post session. Initiated wall slides today to improve scapulohumeral rhythm while reaching OH. Pt requires cues to prevent upper trap compensation. Progressed resistance on IR walkouts today, which pt tolerates well. Will continue to progress as tolerated.       Patient will continue to benefit from skilled outpatient physical therapy to address the deficits listed in the problem list box on initial evaluation, provide pt/family education and to maximize pt's level of independence in the home and community environment.     Patient's spiritual, cultural, and educational needs considered and patient agreeable to plan of care and goals.           Plan:  Continue POC    Goals:   Active       Long Term Goals       Pt will improve FOTO score to </=see FOTO% limited to decrease perceived limitation with carrying, moving, and handling objects.       Start:  02/24/25    Expected End:  08/11/25            Pt will increase  shoulder AROM to WFL in all planes to improve functional use of R RUE.       Start:  02/24/25    Expected End:  08/11/25            Pt will improve  shoulder MMTs to = 4+/5 to promote equal use of B UEs in  performing functional tasks.       Start:  02/24/25    Expected End:  08/11/25            Pt will lift 10 lb objects without pain to promote functional QOL.       Start:  02/24/25    Expected End:  08/11/25            Pt to report pain </= 0/10       Start:  02/24/25    Expected End:  08/11/25               Short Term Goals        Pt will be independent with HEP to supplement PT in improving functional use of L UE.       Start:  02/24/25    Expected End:  03/24/25            Pt will increase pain free shoulder elevation PROM to >/= 160 deg to improve functional mobility of UE       Start:  02/24/25    Expected End:  03/24/25            Pt will increase shoulder ER PROM in 90 deg abduction to >/=20 deg to improve functional mobility of UE       Start:  02/24/25    Expected End:  03/24/25            Pt will increase elbow AAROM to WNL deg in 4 weeks to improve functional mobility of UE.       Start:  02/24/25    Expected End:  03/24/25                Delfino Quan, PT DPT

## 2025-04-29 NOTE — PROGRESS NOTES
Outpatient Rehab    Physical Therapy Visit    Patient Name: Ron Christina  MRN: [id number]  YOB: 1955  Encounter Date: 3/17/2025    Therapy Diagnosis:   Encounter Diagnoses   Name Primary?    Decreased range of motion of left shoulder Yes    Decreased strength of upper extremity          Physician: Alma Hathaway*    Physician Orders: Eval and Treat    Medical Diagnosis: Traumatic complete tear of left rotator cuff, initial encounter [S46.012A]      Visit # / Visits Authorized:  17 / 30   Date of Evaluation:  2/24/2025   Insurance Authorization Period: 2/11/2025 to 2/11/2026  Plan of Care Certification:  2/24/2025 to 5/19/2025      Time In:   8:00am  Time Out:  9:00am  Total Time:   30 min  Total Billable Time: 30 min    FOTO:  Intake Score:  %  Survey Score 1:  %  Survey Score 2:  %         Subjective         Pt reports his shoulder was a little sore after last session. HEP is going well at home.   Objective    S/p L RCR DOS: 2/21/2025.   Pt is 9wks, 5 days out as of 4/28/2025      Shoulder Passive Range of Motion: 4/28/2025  Shoulder Right Left   Flexion    170 Pre-140  Post-155   ER at 0    80 65 free and easy   ER at 90    Not tested 2/2 post-op Not tested 2/2 post-op   IR at 90    Not tested 2/2 post-op Not tested 2/2 post-op       Shoulder Active Range of Motion: 4/28/2025  Shoulder Right Left   Flexion   155 Pre-130  Post-145          Treatment     Ron received the treatments listed below:      therapeutic exercises to develop strength, endurance, ROM, flexibility, posture, and core stabilization for 15 minutes including:  Supine dowel AAROM ER 30x  Supine dowel AAROM chest press to overhead elevation 30x (narrow and wide)  Arm bike for tissue extensibility 4 min fwd/ 4min bck    NT:  Wrist [id number]  Elbow arom 20x(pro, elke, sup)  Pendelum hangs 1 min 3x  manual therapy techniques: Joint mobilizations, Manual traction, Myofacial release, Manual Lymphatic Drainage, Soft tissue  Mobilization, and Friction Massage were applied to the: shoulder for 15 minutes, including:  Grade III post/inferior mobs  Gentle oscillations  Lat contract/relax  reassessment    neuromuscular re-education activities to improve: Balance, Coordination, Kinesthetic, Sense, Proprioception, and Posture for 30 minutes. The following activities were included:  ER walkout OTB 3x15  IR walkout GTB 3x10  Wall slides 3x10    NT:  Shoulder shrugs 5 sec holds 30x  Sidelying dowel assist flexion 2x15  SL ER 0# 2x15 5 sec holds  Landmine dowel press 2x15  Pulleys w/ cueing for UT/LS control 3minx2  therapeutic activities to improve functional performance for 0  minutes, including:       Assessment & Plan   Assessment:    Pt presents with good carryover in ROM and improvement in active flexion from 130-145 pre/post session. Initiated wall slides today to improve scapulohumeral rhythm while reaching OH. Pt requires cues to prevent upper trap compensation. Progressed resistance on IR walkouts today, which pt tolerates well. Will continue to progress as tolerated.       Patient will continue to benefit from skilled outpatient physical therapy to address the deficits listed in the problem list box on initial evaluation, provide pt/family education and to maximize pt's level of independence in the home and community environment.     Patient's spiritual, cultural, and educational needs considered and patient agreeable to plan of care and goals.           Plan:  Continue POC    Goals:   Active       Long Term Goals       Pt will improve FOTO score to </=see FOTO% limited to decrease perceived limitation with carrying, moving, and handling objects.       Start:  02/24/25    Expected End:  08/11/25            Pt will increase  shoulder AROM to WFL in all planes to improve functional use of R RUE.       Start:  02/24/25    Expected End:  08/11/25            Pt will improve  shoulder MMTs to = 4+/5 to promote equal use of B UEs in  performing functional tasks.       Start:  02/24/25    Expected End:  08/11/25            Pt will lift 10 lb objects without pain to promote functional QOL.       Start:  02/24/25    Expected End:  08/11/25            Pt to report pain </= 0/10       Start:  02/24/25    Expected End:  08/11/25               Short Term Goals        Pt will be independent with HEP to supplement PT in improving functional use of L UE.       Start:  02/24/25    Expected End:  03/24/25            Pt will increase pain free shoulder elevation PROM to >/= 160 deg to improve functional mobility of UE       Start:  02/24/25    Expected End:  03/24/25            Pt will increase shoulder ER PROM in 90 deg abduction to >/=20 deg to improve functional mobility of UE       Start:  02/24/25    Expected End:  03/24/25            Pt will increase elbow AAROM to WNL deg in 4 weeks to improve functional mobility of UE.       Start:  02/24/25    Expected End:  03/24/25                Delfino Quan, PT DPT

## 2025-05-01 ENCOUNTER — CLINICAL SUPPORT (OUTPATIENT)
Dept: REHABILITATION | Facility: HOSPITAL | Age: 70
End: 2025-05-01
Payer: MEDICARE

## 2025-05-01 DIAGNOSIS — S46.012A TRAUMATIC COMPLETE TEAR OF LEFT ROTATOR CUFF, INITIAL ENCOUNTER: ICD-10-CM

## 2025-05-01 DIAGNOSIS — M25.512 ACUTE PAIN OF LEFT SHOULDER: ICD-10-CM

## 2025-05-01 DIAGNOSIS — M25.612 DECREASED RANGE OF MOTION OF LEFT SHOULDER: Primary | ICD-10-CM

## 2025-05-01 DIAGNOSIS — R29.898 DECREASED STRENGTH OF UPPER EXTREMITY: ICD-10-CM

## 2025-05-01 PROCEDURE — 97110 THERAPEUTIC EXERCISES: CPT | Mod: HCNC

## 2025-05-01 PROCEDURE — 97112 NEUROMUSCULAR REEDUCATION: CPT | Mod: HCNC

## 2025-05-01 PROCEDURE — 97140 MANUAL THERAPY 1/> REGIONS: CPT | Mod: HCNC

## 2025-05-01 NOTE — PROGRESS NOTES
Outpatient Rehab    Physical Therapy Visit    Patient Name: Ron Christina  MRN: [id number]  YOB: 1955  Encounter Date: 3/17/2025    Therapy Diagnosis:   Encounter Diagnoses   Name Primary?    Decreased range of motion of left shoulder Yes    Decreased strength of upper extremity          Physician: Alma Hathaway*    Physician Orders: Eval and Treat    Medical Diagnosis: Traumatic complete tear of left rotator cuff, initial encounter [S46.012A]      Visit # / Visits Authorized:  18 / 30   Date of Evaluation:  2/24/2025   Insurance Authorization Period: 2/11/2025 to 2/11/2026  Plan of Care Certification:  2/24/2025 to 5/19/2025      Time In:   9:00am  Time Out:  10:00am  Total Time:   60 min  Total Billable Time: 60 min    FOTO:  Intake Score:  %  Survey Score 1:  %  Survey Score 2:  %         Subjective         Pt reports increased anterior shoulder soreness after last visit. It is a 1.5/10 soreness that will come on when he raises his arm and goes away quickly.   Objective    S/p L RCR DOS: 2/21/2025.   Pt is 10wks, 0 days out as of 5/1/2025      Shoulder Passive Range of Motion: 4/28/2025  Shoulder Right Left   Flexion    170 Pre-140  Post-155   ER at 0    80 65 free and easy   ER at 90    Not tested 2/2 post-op Not tested 2/2 post-op   IR at 90    Not tested 2/2 post-op Not tested 2/2 post-op       Shoulder Active Range of Motion: 4/28/2025  Shoulder Right Left   Flexion   155 Pre-130  Post-145          Treatment     Ron received the treatments listed below:      therapeutic exercises to develop strength, endurance, ROM, flexibility, posture, and core stabilization for 15 minutes including:  Supine dowel AAROM ER 30x  Supine dowel AAROM chest press to overhead elevation 30x (narrow and wide)  Arm bike for tissue extensibility 4 min fwd/ 4min bck    NT:  Wrist [id number]  Elbow arom 20x(pro, elke, sup)  Pendelum hangs 1 min 3x  manual therapy techniques: Joint mobilizations, Manual  traction, Myofacial release, Manual Lymphatic Drainage, Soft tissue Mobilization, and Friction Massage were applied to the: shoulder for 15 minutes, including:  Grade III post/inferior mobs  Gentle oscillations  Lat contract/relax  reassessment    neuromuscular re-education activities to improve: Balance, Coordination, Kinesthetic, Sense, Proprioception, and Posture for 30 minutes. The following activities were included:  SL ER 1# 3x10  IR walkout GTB 3x10  Wall slides 3x8    NT:  Shoulder shrugs 5 sec holds 30x  Sidelying dowel assist flexion 2x15  Landmine dowel press 2x15  Pulleys w/ cueing for UT/LS control 3minx2  ER walkout OTB 3x15  therapeutic activities to improve functional performance for 0  minutes, including:       Assessment & Plan   Assessment:    Pt presents with good carryover in ROM. Anterior shoulder soreness improves after warming up on UBE.  Continued with RTC activation and AROM exercises. Pt demonstrates improved ability to prevent UT compensation with wall slides today. Pt tolerates session well. Will continue to progress as tolerated.       Patient will continue to benefit from skilled outpatient physical therapy to address the deficits listed in the problem list box on initial evaluation, provide pt/family education and to maximize pt's level of independence in the home and community environment.     Patient's spiritual, cultural, and educational needs considered and patient agreeable to plan of care and goals.           Plan:  Continue POC    Goals:   Active       Long Term Goals       Pt will improve FOTO score to </=see FOTO% limited to decrease perceived limitation with carrying, moving, and handling objects.       Start:  02/24/25    Expected End:  08/11/25            Pt will increase  shoulder AROM to WFL in all planes to improve functional use of R RUE.       Start:  02/24/25    Expected End:  08/11/25            Pt will improve  shoulder MMTs to = 4+/5 to promote equal use of B UEs  in performing functional tasks.       Start:  02/24/25    Expected End:  08/11/25            Pt will lift 10 lb objects without pain to promote functional QOL.       Start:  02/24/25    Expected End:  08/11/25            Pt to report pain </= 0/10       Start:  02/24/25    Expected End:  08/11/25               Short Term Goals        Pt will be independent with HEP to supplement PT in improving functional use of L UE.       Start:  02/24/25    Expected End:  03/24/25            Pt will increase pain free shoulder elevation PROM to >/= 160 deg to improve functional mobility of UE       Start:  02/24/25    Expected End:  03/24/25            Pt will increase shoulder ER PROM in 90 deg abduction to >/=20 deg to improve functional mobility of UE       Start:  02/24/25    Expected End:  03/24/25            Pt will increase elbow AAROM to WNL deg in 4 weeks to improve functional mobility of UE.       Start:  02/24/25    Expected End:  03/24/25                Delfino Quan, PT DPT

## 2025-05-01 NOTE — PROGRESS NOTES
Outpatient Rehab    Physical Therapy Visit    Patient Name: Ron Christina  MRN: [id number]  YOB: 1955  Encounter Date: 3/17/2025    Therapy Diagnosis:   Encounter Diagnoses   Name Primary?    Decreased range of motion of left shoulder Yes    Decreased strength of upper extremity          Physician: Alma Hathaway*    Physician Orders: Eval and Treat    Medical Diagnosis: Traumatic complete tear of left rotator cuff, initial encounter [S46.012A]      Visit # / Visits Authorized:  18 / 30   Date of Evaluation:  2/24/2025   Insurance Authorization Period: 2/11/2025 to 2/11/2026  Plan of Care Certification:  2/24/2025 to 5/19/2025      Time In:   9:00am  Time Out:  10:00am  Total Time:   60 min  Total Billable Time: 60 min    FOTO:  Intake Score:  %  Survey Score 1:  %  Survey Score 2:  %         Subjective         Pt reports increased anterior shoulder soreness after last visit. It is a 1.5/10 soreness that will come on when he raises his arm and goes away quickly.   Objective    S/p L RCR DOS: 2/21/2025.   Pt is 10wks, 0 days out as of 5/1/2025      Shoulder Passive Range of Motion: 4/28/2025  Shoulder Right Left   Flexion    170 Pre-140  Post-155   ER at 0    80 65 free and easy   ER at 90    Not tested 2/2 post-op Not tested 2/2 post-op   IR at 90    Not tested 2/2 post-op Not tested 2/2 post-op       Shoulder Active Range of Motion: 4/28/2025  Shoulder Right Left   Flexion   155 Pre-130  Post-145          Treatment     Ron received the treatments listed below:      therapeutic exercises to develop strength, endurance, ROM, flexibility, posture, and core stabilization for 15 minutes including:  Supine dowel AAROM ER 30x  Supine dowel AAROM chest press to overhead elevation 30x (narrow and wide)  Arm bike for tissue extensibility 4 min fwd/ 4min bck    NT:  Wrist [id number]  Elbow arom 20x(pro, elke, sup)  Pendelum hangs 1 min 3x  manual therapy techniques: Joint mobilizations, Manual  traction, Myofacial release, Manual Lymphatic Drainage, Soft tissue Mobilization, and Friction Massage were applied to the: shoulder for 15 minutes, including:  Grade III post/inferior mobs  Gentle oscillations  Lat contract/relax  reassessment    neuromuscular re-education activities to improve: Balance, Coordination, Kinesthetic, Sense, Proprioception, and Posture for 30 minutes. The following activities were included:  SL ER 1# 3x10  IR walkout GTB 3x10  Wall slides 3x8    NT:  Shoulder shrugs 5 sec holds 30x  Sidelying dowel assist flexion 2x15  Landmine dowel press 2x15  Pulleys w/ cueing for UT/LS control 3minx2  ER walkout OTB 3x15  therapeutic activities to improve functional performance for 0  minutes, including:       Assessment & Plan   Assessment:    Pt presents with good carryover in ROM. Anterior shoulder soreness improves after warming up on UBE.  Continued with RTC activation and AROM exercises. Pt demonstrates improved ability to prevent UT compensation with wall slides today. Pt tolerates session well. Will continue to progress as tolerated.       Patient will continue to benefit from skilled outpatient physical therapy to address the deficits listed in the problem list box on initial evaluation, provide pt/family education and to maximize pt's level of independence in the home and community environment.     Patient's spiritual, cultural, and educational needs considered and patient agreeable to plan of care and goals.           Plan:  Continue POC    Goals:   Active       Long Term Goals       Pt will improve FOTO score to </=see FOTO% limited to decrease perceived limitation with carrying, moving, and handling objects.       Start:  02/24/25    Expected End:  08/11/25            Pt will increase  shoulder AROM to WFL in all planes to improve functional use of R RUE.       Start:  02/24/25    Expected End:  08/11/25            Pt will improve  shoulder MMTs to = 4+/5 to promote equal use of B UEs  in performing functional tasks.       Start:  02/24/25    Expected End:  08/11/25            Pt will lift 10 lb objects without pain to promote functional QOL.       Start:  02/24/25    Expected End:  08/11/25            Pt to report pain </= 0/10       Start:  02/24/25    Expected End:  08/11/25               Short Term Goals        Pt will be independent with HEP to supplement PT in improving functional use of L UE.       Start:  02/24/25    Expected End:  03/24/25            Pt will increase pain free shoulder elevation PROM to >/= 160 deg to improve functional mobility of UE       Start:  02/24/25    Expected End:  03/24/25            Pt will increase shoulder ER PROM in 90 deg abduction to >/=20 deg to improve functional mobility of UE       Start:  02/24/25    Expected End:  03/24/25            Pt will increase elbow AAROM to WNL deg in 4 weeks to improve functional mobility of UE.       Start:  02/24/25    Expected End:  03/24/25                Delfino Quan, PT DPT

## 2025-05-06 ENCOUNTER — CLINICAL SUPPORT (OUTPATIENT)
Dept: REHABILITATION | Facility: HOSPITAL | Age: 70
End: 2025-05-06
Payer: MEDICARE

## 2025-05-06 DIAGNOSIS — M25.612 DECREASED RANGE OF MOTION OF LEFT SHOULDER: Primary | ICD-10-CM

## 2025-05-06 DIAGNOSIS — R29.898 DECREASED STRENGTH OF UPPER EXTREMITY: ICD-10-CM

## 2025-05-06 PROCEDURE — 97112 NEUROMUSCULAR REEDUCATION: CPT | Mod: HCNC

## 2025-05-06 PROCEDURE — 97140 MANUAL THERAPY 1/> REGIONS: CPT | Mod: HCNC

## 2025-05-06 PROCEDURE — 97110 THERAPEUTIC EXERCISES: CPT | Mod: HCNC

## 2025-05-06 NOTE — PROGRESS NOTES
Outpatient Rehab    Physical Therapy Visit    Patient Name: Ron Christina  MRN: [id number]  YOB: 1955  Encounter Date: 3/17/2025    Therapy Diagnosis:   Encounter Diagnoses   Name Primary?    Decreased range of motion of left shoulder Yes    Decreased strength of upper extremity          Physician: Alma Hathaway*    Physician Orders: Eval and Treat    Medical Diagnosis: Traumatic complete tear of left rotator cuff, initial encounter [S46.012A]      Visit # / Visits Authorized:  19 / 30   Date of Evaluation:  2/24/2025   Insurance Authorization Period: 2/11/2025 to 2/11/2026  Plan of Care Certification:  2/24/2025 to 5/19/2025      Time In:   9:00am  Time Out:  10:00am  Total Time:   60 min  Total Billable Time: 60 min    FOTO:  Intake Score:  %  Survey Score 1:  %  Survey Score 2:  %         Subjective         Pt reports shoulder has been a little more sore than normal recently after aggravating shoulder at home last week. He has been under 2/10 pain and has his full mobility.   Objective    S/p L RCR DOS: 2/21/2025.   Pt is 10wks, 5 days out as of 5/5/2025      Shoulder Passive Range of Motion: 4/28/2025  Shoulder Right Left   Flexion    170 Pre-140  Post-155   ER at 0    80 65 free and easy   ER at 90    Not tested 2/2 post-op Not tested 2/2 post-op   IR at 90    Not tested 2/2 post-op Not tested 2/2 post-op       Shoulder Active Range of Motion: 5/6/2025  Shoulder Right Left   Flexion   155 Pre-135  Post-145          Treatment     Ron received the treatments listed below:      therapeutic exercises to develop strength, endurance, ROM, flexibility, posture, and core stabilization for 15 minutes including:  Supine dowel AAROM ER 30x  Supine dowel AAROM chest press to overhead elevation 30x (narrow and wide)  Arm bike for tissue extensibility 4 min fwd/ 4min bck    NT:  Wrist [id number]  Elbow arom 20x(pro, elke, sup)  Pendelum hangs 1 min 3x  manual therapy techniques: Joint  "mobilizations, Manual traction, Myofacial release, Manual Lymphatic Drainage, Soft tissue Mobilization, and Friction Massage were applied to the: shoulder for 15 minutes, including:  Grade III post/inferior mobs  Gentle oscillations  Lat contract/relax  reassessment    neuromuscular re-education activities to improve: Balance, Coordination, Kinesthetic, Sense, Proprioception, and Posture for 30 minutes. The following activities were included:  SL ER 0# 3x10 5"seconds  IR walkout GTB 3x15  Wall slides 3x8  ER walkout OTB 3x10  NT:  Shoulder shrugs 5 sec holds 30x  Sidelying dowel assist flexion 2x15  Landmine dowel press 2x15  Pulleys w/ cueing for UT/LS control 3minx2    therapeutic activities to improve functional performance for 0  minutes, including:       Assessment & Plan   Assessment:    Pt presents with good carryover in ROM. Presents with slightly increased soreness in the shoulder and reports improvement by the end of session. Continued with RTC activation and AROM exercises. Requires tactile and verbal cues for UT compensation with wall slides and pulleys and is able to correct. Will continue to progress as tolerated.       Patient will continue to benefit from skilled outpatient physical therapy to address the deficits listed in the problem list box on initial evaluation, provide pt/family education and to maximize pt's level of independence in the home and community environment.     Patient's spiritual, cultural, and educational needs considered and patient agreeable to plan of care and goals.           Plan:  Continue POC    Goals:   Active       Long Term Goals       Pt will improve FOTO score to </=see FOTO% limited to decrease perceived limitation with carrying, moving, and handling objects.       Start:  02/24/25    Expected End:  08/11/25            Pt will increase  shoulder AROM to WFL in all planes to improve functional use of R RUE.       Start:  02/24/25    Expected End:  08/11/25            " Pt will improve  shoulder MMTs to = 4+/5 to promote equal use of B UEs in performing functional tasks.       Start:  02/24/25    Expected End:  08/11/25            Pt will lift 10 lb objects without pain to promote functional QOL.       Start:  02/24/25    Expected End:  08/11/25            Pt to report pain </= 0/10       Start:  02/24/25    Expected End:  08/11/25               Short Term Goals        Pt will be independent with HEP to supplement PT in improving functional use of L UE.       Start:  02/24/25    Expected End:  03/24/25            Pt will increase pain free shoulder elevation PROM to >/= 160 deg to improve functional mobility of UE       Start:  02/24/25    Expected End:  03/24/25            Pt will increase shoulder ER PROM in 90 deg abduction to >/=20 deg to improve functional mobility of UE       Start:  02/24/25    Expected End:  03/24/25            Pt will increase elbow AAROM to WNL deg in 4 weeks to improve functional mobility of UE.       Start:  02/24/25    Expected End:  03/24/25                Delfino Quan, PT DPT

## 2025-05-06 NOTE — PROGRESS NOTES
Outpatient Rehab    Physical Therapy Visit    Patient Name: Ron Christina  MRN: [id number]  YOB: 1955  Encounter Date: 3/17/2025    Therapy Diagnosis:   Encounter Diagnoses   Name Primary?    Decreased range of motion of left shoulder Yes    Decreased strength of upper extremity          Physician: Alma Hathaway*    Physician Orders: Eval and Treat    Medical Diagnosis: Traumatic complete tear of left rotator cuff, initial encounter [S46.012A]      Visit # / Visits Authorized:  18 / 30   Date of Evaluation:  2/24/2025   Insurance Authorization Period: 2/11/2025 to 2/11/2026  Plan of Care Certification:  2/24/2025 to 5/19/2025      Time In:   9:00am  Time Out:  10:00am  Total Time:   60 min  Total Billable Time: 60 min    FOTO:  Intake Score:  %  Survey Score 1:  %  Survey Score 2:  %         Subjective         Pt reports shoulder has been a little more sore than normal recently after aggravating shoulder at home last week. He has been under 2/10 pain and has his full mobility.   Objective    S/p L RCR DOS: 2/21/2025.   Pt is 10wks, 5 days out as of 5/5/2025      Shoulder Passive Range of Motion: 4/28/2025  Shoulder Right Left   Flexion    170 Pre-140  Post-155   ER at 0    80 65 free and easy   ER at 90    Not tested 2/2 post-op Not tested 2/2 post-op   IR at 90    Not tested 2/2 post-op Not tested 2/2 post-op       Shoulder Active Range of Motion: 5/6/2025  Shoulder Right Left   Flexion   155 Pre-135  Post-145          Treatment     Ron received the treatments listed below:      therapeutic exercises to develop strength, endurance, ROM, flexibility, posture, and core stabilization for 15 minutes including:  Supine dowel AAROM ER 30x  Supine dowel AAROM chest press to overhead elevation 30x (narrow and wide)  Arm bike for tissue extensibility 4 min fwd/ 4min bck    NT:  Wrist [id number]  Elbow arom 20x(pro, elke, sup)  Pendelum hangs 1 min 3x  manual therapy techniques: Joint  "mobilizations, Manual traction, Myofacial release, Manual Lymphatic Drainage, Soft tissue Mobilization, and Friction Massage were applied to the: shoulder for 15 minutes, including:  Grade III post/inferior mobs  Gentle oscillations  Lat contract/relax  reassessment    neuromuscular re-education activities to improve: Balance, Coordination, Kinesthetic, Sense, Proprioception, and Posture for 30 minutes. The following activities were included:  SL ER 0# 3x10 5"seconds  IR walkout GTB 3x15  Wall slides 3x8  ER walkout OTB 3x10  NT:  Shoulder shrugs 5 sec holds 30x  Sidelying dowel assist flexion 2x15  Landmine dowel press 2x15  Pulleys w/ cueing for UT/LS control 3minx2    therapeutic activities to improve functional performance for 0  minutes, including:       Assessment & Plan   Assessment:    Pt presents with good carryover in ROM. Presents with slightly increased soreness in the shoulder and reports improvement by the end of session. Continued with RTC activation and AROM exercises. Requires tactile and verbal cues for UT compensation with wall slides and pulleys and is able to correct. Will continue to progress as tolerated.       Patient will continue to benefit from skilled outpatient physical therapy to address the deficits listed in the problem list box on initial evaluation, provide pt/family education and to maximize pt's level of independence in the home and community environment.     Patient's spiritual, cultural, and educational needs considered and patient agreeable to plan of care and goals.           Plan:  Continue POC    Goals:   Active       Long Term Goals       Pt will improve FOTO score to </=see FOTO% limited to decrease perceived limitation with carrying, moving, and handling objects.       Start:  02/24/25    Expected End:  08/11/25            Pt will increase  shoulder AROM to WFL in all planes to improve functional use of R RUE.       Start:  02/24/25    Expected End:  08/11/25            " Pt will improve  shoulder MMTs to = 4+/5 to promote equal use of B UEs in performing functional tasks.       Start:  02/24/25    Expected End:  08/11/25            Pt will lift 10 lb objects without pain to promote functional QOL.       Start:  02/24/25    Expected End:  08/11/25            Pt to report pain </= 0/10       Start:  02/24/25    Expected End:  08/11/25               Short Term Goals        Pt will be independent with HEP to supplement PT in improving functional use of L UE.       Start:  02/24/25    Expected End:  03/24/25            Pt will increase pain free shoulder elevation PROM to >/= 160 deg to improve functional mobility of UE       Start:  02/24/25    Expected End:  03/24/25            Pt will increase shoulder ER PROM in 90 deg abduction to >/=20 deg to improve functional mobility of UE       Start:  02/24/25    Expected End:  03/24/25            Pt will increase elbow AAROM to WNL deg in 4 weeks to improve functional mobility of UE.       Start:  02/24/25    Expected End:  03/24/25                Delfino Quan, PT DPT

## 2025-05-08 ENCOUNTER — CLINICAL SUPPORT (OUTPATIENT)
Dept: REHABILITATION | Facility: HOSPITAL | Age: 70
End: 2025-05-08
Payer: MEDICARE

## 2025-05-08 DIAGNOSIS — R29.898 DECREASED STRENGTH OF UPPER EXTREMITY: ICD-10-CM

## 2025-05-08 DIAGNOSIS — M25.612 DECREASED RANGE OF MOTION OF LEFT SHOULDER: Primary | ICD-10-CM

## 2025-05-08 PROCEDURE — 97110 THERAPEUTIC EXERCISES: CPT | Mod: HCNC

## 2025-05-08 PROCEDURE — 97112 NEUROMUSCULAR REEDUCATION: CPT | Mod: HCNC

## 2025-05-08 NOTE — PROGRESS NOTES
Outpatient Rehab    Physical Therapy Visit    Patient Name: Ron Christina  MRN: [id number]  YOB: 1955  Encounter Date: 3/17/2025    Therapy Diagnosis:   Encounter Diagnoses   Name Primary?    Decreased range of motion of left shoulder Yes    Decreased strength of upper extremity          Physician: Alma Hathaway*    Physician Orders: Eval and Treat    Medical Diagnosis: Traumatic complete tear of left rotator cuff, initial encounter [S46.012A]      Visit # / Visits Authorized:  20 / 30   Date of Evaluation:  2/24/2025   Insurance Authorization Period: 2/11/2025 to 2/11/2026  Plan of Care Certification:  2/24/2025 to 5/19/2025      Time In:   9:00am  Time Out:  10:00am  Total Time:   60 min  Total Billable Time: 60 min    FOTO:  Intake Score:  %  Survey Score 1:  %  Survey Score 2:  %         Subjective         Pt reports shoulder has been a little more sore than normal recently after aggravating shoulder at home last week. He has been under 2/10 pain and has his full mobility.   Objective    S/p L RCR DOS: 2/21/2025.   Pt is 10wks, 5 days out as of 5/5/2025      Shoulder Passive Range of Motion: 4/28/2025  Shoulder Right Left   Flexion    170 Pre-140  Post-155   ER at 0    80 65 free and easy   ER at 90    Not tested 2/2 post-op Not tested 2/2 post-op   IR at 90    Not tested 2/2 post-op Not tested 2/2 post-op       Shoulder Active Range of Motion: 5/8/2025  Shoulder Right Left   Flexion   155 Pre-140  Post-145          Treatment     Ron received the treatments listed below:      therapeutic exercises to develop strength, endurance, ROM, flexibility, posture, and core stabilization for 15 minutes including:  Supine dowel AAROM ER 30x (@30, and @60)  Supine dowel AAROM chest press to overhead elevation 30x (narrow and wide)  Arm bike for tissue extensibility 4 min fwd/ 4min bck    NT:  Wrist [id number]  Elbow arom 20x(pro, elke, sup)  Pendelum hangs 1 min 3x  manual therapy techniques:  "Joint mobilizations, Manual traction, Myofacial release, Manual Lymphatic Drainage, Soft tissue Mobilization, and Friction Massage were applied to the: shoulder for 15 minutes, including:  Grade III post/inferior mobs  Gentle oscillations  Lat contract/relax  reassessment    neuromuscular re-education activities to improve: Balance, Coordination, Kinesthetic, Sense, Proprioception, and Posture for 30 minutes. The following activities were included:  SL ER 0# 3x10 5"seconds  Wall slides 3x10  Pulleys w/ cueing for UT/LS control 3minx2    ER walkout OTB 2x10  IR walkout OTB 2x10    NT:  Sidelying dowel assist flexion 2x15  Landmine dowel press 2x15      therapeutic activities to improve functional performance for 0  minutes, including:       Assessment & Plan   Assessment:    Pt presents with good carryover in ROM. Presents with slightly increased soreness in the shoulder and reports improvement by the end of session. Regressed with RTC activation for improved tolerance and did well. Requires tactile and verbal cues for UT compensation with wall slides and pulleys and is able to correct. Will continue to progress as tolerated.       Patient will continue to benefit from skilled outpatient physical therapy to address the deficits listed in the problem list box on initial evaluation, provide pt/family education and to maximize pt's level of independence in the home and community environment.     Patient's spiritual, cultural, and educational needs considered and patient agreeable to plan of care and goals.           Plan:  Continue POC    Goals:   Active       Long Term Goals       Pt will improve FOTO score to </=see FOTO% limited to decrease perceived limitation with carrying, moving, and handling objects.       Start:  02/24/25    Expected End:  08/11/25            Pt will increase  shoulder AROM to WFL in all planes to improve functional use of R RUE.       Start:  02/24/25    Expected End:  08/11/25            Pt " will improve  shoulder MMTs to = 4+/5 to promote equal use of B UEs in performing functional tasks.       Start:  02/24/25    Expected End:  08/11/25            Pt will lift 10 lb objects without pain to promote functional QOL.       Start:  02/24/25    Expected End:  08/11/25            Pt to report pain </= 0/10       Start:  02/24/25    Expected End:  08/11/25               Short Term Goals        Pt will be independent with HEP to supplement PT in improving functional use of L UE.       Start:  02/24/25    Expected End:  03/24/25            Pt will increase pain free shoulder elevation PROM to >/= 160 deg to improve functional mobility of UE       Start:  02/24/25    Expected End:  03/24/25            Pt will increase shoulder ER PROM in 90 deg abduction to >/=20 deg to improve functional mobility of UE       Start:  02/24/25    Expected End:  03/24/25            Pt will increase elbow AAROM to WNL deg in 4 weeks to improve functional mobility of UE.       Start:  02/24/25    Expected End:  03/24/25                Delfino Quan, PT DPT

## 2025-05-09 NOTE — PROGRESS NOTES
CC: Left shoulder post-op follow up     DATE OF PROCEDURE: 2/21/2025   OPERATION:   Left  1. Shoulder arthroscopic rotator cuff repair with Regeneten patch augmentation (CPT 05664-53)  2. Shoulder arthroscopic biceps tenodesis (CPT 78371)  3. Shoulder arthroscopic extensive debridement (CPT 02388)    4. Shoulder arthroscopic subacromial decompression     Ron Christina presents today for follow up appointment of his left shoulder. Patient is now 3 months status post above procedure. Continues PT at [facility] with Ian. Doing well. Reports occasional muscle soreness and spasms in his biceps.  He did have a pre-existing left elbow injury with distal biceps injury with a chronic deformity in the past.  Otherwise he does not have the same pain he had before surgery.  Very encouraged.  Making good progress.    Prior Hx 4/3/2025:   Ron Christina reports to be doing well 5 weeks 6 days s/p the above mentioned procedure. Going to PT at the [place] location.  States the shoulder is feeling quite good.  No pain.  Pleased with recovery.  No numbness or tingling.    REVIEW OF SYSTEMS:   Constitution: Negative. Negative for chills, fever and night sweats.    Hematologic/Lymphatic: Negative for bleeding problem. Does not bruise/bleed easily.   Skin: Negative for dry skin, itching and rash.   Musculoskeletal: Negative for falls.  Negative for Left shoulder pain and muscle weakness.     All other review of symptoms were reviewed and found to be noncontributory.     PAST MEDICAL HISTORY:   Past Medical History:   Diagnosis Date    Allergy     COVID-19 07/2021    presumed Delta strain    Deep vein thrombosis     Elevated blood pressure reading without diagnosis of hypertension 09/28/2016    Good home BP log      Other hyperlipidemia 02/13/2014    ASCVD risk score indicates need for statin.  I have counseled the patient on this risk factor as well also recommendation for statin use for reduction of heart attack and stroke  "risk.  He has verbally acknowledged this information and wishes NOT to pursue statins.  He is aware that this decision leaves him untreated for significant cardiovascular risk      Recurrent deep vein thrombosis (DVT) 10/06/2023    Positive family history.  Known to Hematology.  Recommended long-term DOAC      Umbilical hernia without obstruction and without gangrene 11/22/2019    Small.  Asymptomatic       PAST SURGICAL HISTORY:   Past Surgical History:   Procedure Laterality Date    ARTHROSCOPIC DEBRIDEMENT OF SHOULDER Left 2/21/2025    Procedure: DEBRIDEMENT, SHOULDER, ARTHROSCOPIC;  Surgeon: ELIZABETH Brown MD;  Location: [facility] OR;  Service: Orthopedics;  Laterality: Left;    ARTHROSCOPIC REPAIR OF ROTATOR CUFF OF SHOULDER Left 2/21/2025    Procedure: REPAIR, ROTATOR CUFF, ARTHROSCOPIC- POLAR CARE;  Surgeon: ELIZABETH Brown MD;  Location: [facility] OR;  Service: Orthopedics;  Laterality: Left;  General with Regional - SINGLE SHOT INTERSCALENE    ARTHROSCOPY OF SHOULDER WITH DECOMPRESSION OF SUBACROMIAL SPACE Left 2/21/2025    Procedure: ARTHROSCOPY, SHOULDER, WITH SUBACROMIAL SPACE DECOMPRESSION;  Surgeon: ELIZABETH Brown MD;  Location: [facility] OR;  Service: Orthopedics;  Laterality: Left;    ARTHROSCOPY,SHOULDER,WITH BICEPS TENODESIS Left 2/21/2025    Procedure: ARTHROSCOPY,SHOULDER,WITH BICEPS TENODESIS;  Surgeon: ELIZABETH Brown MD;  Location: [facility] OR;  Service: Orthopedics;  Laterality: Left;  General with Regional - SINGLE SHOT INTERSCALENE    HERNIA REPAIR       FAMILY HISTORY:   Family History   Problem Relation Name Age of Onset    Alcohol abuse Mother      Asthma Father       SOCIAL HISTORY:   Social History[1]    MEDICATIONS:   Current Medications[2]    ALLERGIES:   Review of patient's allergies indicates:   Allergen Reactions    Penicillins Other (See Comments)      PHYSICAL EXAMINATION:  BP (!) 138/92   Pulse 75   Ht 6' 1" (1.854 m)   Wt 109 kg (240 lb 3.1 oz)   BMI 31.69 kg/m²     General: " Well-developed well-nourished 69 y.o. malein no acute distress   Cardiovascular: Regular rhythm by palpation of distal pulse, normal color and temperature, no concerning varicosities on symptomatic side   Lungs: No labored breathing or wheezing appreciated   Neuro: Alert and oriented ×3   Psychiatric: well oriented to person, place and time, demonstrates normal mood and affect   Skin: No rashes, lesions or ulcers, normal temperature, turgor, and texture on involved extremity    Ortho/SPM Exam  Exam of the left shoulder again shows well-healed incisions.  No significant swelling.  No signs of infection.  No significant tenderness.  Active forward elevation scapular plane to 160, passive to 170.  Active external rotation with arm at side to 50-60 degrees.  Passive external rotation with arm at side to 70 degrees.  No significant stiffness.  Internal rotation to T10.  Good cuff activation.  No pain on mild resisted scaption. No scapular winging.  Motor and sensory intact to the left hand.  Chronic biceps deformity that does not seem to bother him.    IMAGING:  None    ASSESSMENT:      ICD-10-CM ICD-9-CM   1. Weakness of left shoulder  R29.898 781.99   2. S/P left rotator cuff repair  Z98.890 V45.89       PLAN:     -Findings and treatment options were discussed with the patient.  Clinically he is doing quite well.  Cautioned against doing too much too soon.  May begin the resisted/strengthening phase of recovery.  This was a large, segmental tear with poor tissue quality.  No lifting more than 5 lb.  -Continue PT - working on building functional strength.  -RTC in 2 months.  Expect more of a full release to all normal day-to-day activity at that time.  No heavy lifting until 6 months postop.  -All questions answered    Procedures          [1]   Social History  Socioeconomic History    Marital status:    Tobacco Use    Smoking status: Never     Passive exposure: Never    Smokeless tobacco: Never   Substance and  Sexual Activity    Alcohol use: Yes     Alcohol/week: 2.0 standard drinks of alcohol     Types: 1 Glasses of wine, 1 Shots of liquor per week     Comment: once a month    Drug use: No    Sexual activity: Not Currently     Social Drivers of Health     Financial Resource Strain: Low Risk  (5/21/2025)    Overall Financial Resource Strain (CARDIA)     Difficulty of Paying Living Expenses: Not hard at all   Food Insecurity: Patient Declined (5/21/2025)    Hunger Vital Sign     Worried About Running Out of Food in the Last Year: Patient declined     Ran Out of Food in the Last Year: Patient declined   Transportation Needs: Unknown (5/21/2025)    PRAPARE - Transportation     Lack of Transportation (Medical): Patient declined     Lack of Transportation (Non-Medical): No   Physical Activity: Insufficiently Active (5/21/2025)    Exercise Vital Sign     Days of Exercise per Week: 4 days     Minutes of Exercise per Session: 30 min   Stress: Stress Concern Present (5/21/2025)    [employer] - Occupational Stress Questionnaire     Feeling of Stress : To some extent   Housing Stability: Patient Declined (5/21/2025)    Housing Stability Vital Sign     Unable to Pay for Housing in the Last Year: Patient declined     Homeless in the Last Year: Patient declined   [2]   Current Outpatient Medications:     apixaban (ELIQUIS) 2.5 mg Tab, Take 1 tablet (2.5 mg total) by mouth 2 (two) times daily., Disp: 60 tablet, Rfl: 5    celecoxib (CELEBREX) 200 MG capsule, Take 1 capsule (200 mg total) by mouth once daily., Disp: 30 capsule, Rfl: 1    ondansetron (ZOFRAN-ODT) 4 MG TbDL, Take 1 tablet (4 mg total) by mouth every 8 (eight) hours as needed (nausea). (Patient not taking: Reported on 3/6/2025), Disp: 30 tablet, Rfl: 0    oxyCODONE (ROXICODONE) 5 MG immediate release tablet, Take 1-2 tablets (5-10 mg total) by mouth every 4 to 6 hours as needed for Pain. (Patient not taking: Reported on 4/1/2025), Disp: 25  tablet, Rfl: 0

## 2025-05-13 ENCOUNTER — CLINICAL SUPPORT (OUTPATIENT)
Dept: REHABILITATION | Facility: HOSPITAL | Age: 70
End: 2025-05-13
Payer: MEDICARE

## 2025-05-13 DIAGNOSIS — M25.612 DECREASED RANGE OF MOTION OF LEFT SHOULDER: Primary | ICD-10-CM

## 2025-05-13 DIAGNOSIS — R29.898 DECREASED STRENGTH OF UPPER EXTREMITY: ICD-10-CM

## 2025-05-13 PROCEDURE — 97140 MANUAL THERAPY 1/> REGIONS: CPT | Mod: HCNC

## 2025-05-13 PROCEDURE — 97112 NEUROMUSCULAR REEDUCATION: CPT | Mod: HCNC

## 2025-05-13 NOTE — PROGRESS NOTES
Outpatient Rehab    Physical Therapy Visit    Patient Name: Ron Christina  MRN: [id number]  YOB: 1955  Encounter Date: 3/17/2025    Therapy Diagnosis:   Encounter Diagnoses   Name Primary?    Decreased range of motion of left shoulder Yes    Decreased strength of upper extremity          Physician: Alma Hathaway*    Physician Orders: Eval and Treat    Medical Diagnosis: Traumatic complete tear of left rotator cuff, initial encounter [S46.012A]      Visit # / Visits Authorized:  20 / 30   Date of Evaluation:  2/24/2025   Insurance Authorization Period: 2/11/2025 to 2/11/2026  Plan of Care Certification:  2/24/2025 to 5/19/2025      Time In:   8:45am  Time Out:  9:45am  Total Time:   60 min  Total Billable Time: 60 min    FOTO:  Intake Score:  %  Survey Score 1:  %  Survey Score 2:  %         Subjective         Pt reports shoulder has been a little more sore than normal recently after aggravating shoulder at home last week. He has been under 2/10 pain and has his full mobility.   Objective    S/p L RCR DOS: 2/21/2025.   Pt is 10wks, 5 days out as of 5/5/2025      Shoulder Passive Range of Motion: 4/28/2025  Shoulder Right Left   Flexion    170 Pre-140  Post-155   ER at 0    80 65 free and easy   ER at 90    Not tested 2/2 post-op Not tested 2/2 post-op   IR at 90    Not tested 2/2 post-op Not tested 2/2 post-op       Shoulder Active Range of Motion: 5/8/2025  Shoulder Right Left   Flexion   155 Pre-140  Post-145          Treatment     Ron received the treatments listed below:      therapeutic exercises to develop strength, endurance, ROM, flexibility, posture, and core stabilization for 15 minutes including:  Supine dowel AAROM ER 30x (@30, and @60)  Supine dowel AAROM chest press to overhead elevation 30x (narrow and wide)  Arm bike for tissue extensibility 4 min fwd/ 4min bck    NT:  Wrist [id number]  Elbow arom 20x(pro, elke, sup)  Pendelum hangs 1 min 3x  manual therapy techniques:  "Joint mobilizations, Manual traction, Myofacial release, Manual Lymphatic Drainage, Soft tissue Mobilization, and Friction Massage were applied to the: shoulder for 15 minutes, including:  Grade III post/inferior mobs  Gentle oscillations  Lat contract/relax  reassessment    neuromuscular re-education activities to improve: Balance, Coordination, Kinesthetic, Sense, Proprioception, and Posture for 30 minutes. The following activities were included:  SL ER 0# 3x10 5"seconds  SL flexion arom 3x10    ER walkout OTB 2x10  IR walkout OTB 2x10    NT:  Sidelying dowel assist flexion 2x15  Landmine dowel press 2x15      therapeutic activities to improve functional performance for 0  minutes, including:       Assessment & Plan   Assessment:    Pt presents with min carryover in ROM from previous visit 2/2 to barriers to complete HEP at home Discussed strategies to overcome barriers with good patient buy in. Progressed gravity minimized flexion with good tolerance and appropriate muscle response. Plan to introduce increased in AROM interventions and load next visit. Will continue to progress as tolerated.       Patient will continue to benefit from skilled outpatient physical therapy to address the deficits listed in the problem list box on initial evaluation, provide pt/family education and to maximize pt's level of independence in the home and community environment.     Patient's spiritual, cultural, and educational needs considered and patient agreeable to plan of care and goals.           Plan:  Continue POC    Goals:   Active       Long Term Goals       Pt will improve FOTO score to </=see FOTO% limited to decrease perceived limitation with carrying, moving, and handling objects.       Start:  02/24/25    Expected End:  08/11/25            Pt will increase  shoulder AROM to WFL in all planes to improve functional use of R RUE.       Start:  02/24/25    Expected End:  08/11/25            Pt will improve  shoulder MMTs to = " 4+/5 to promote equal use of B UEs in performing functional tasks.       Start:  02/24/25    Expected End:  08/11/25            Pt will lift 10 lb objects without pain to promote functional QOL.       Start:  02/24/25    Expected End:  08/11/25            Pt to report pain </= 0/10       Start:  02/24/25    Expected End:  08/11/25               Short Term Goals        Pt will be independent with HEP to supplement PT in improving functional use of L UE.       Start:  02/24/25    Expected End:  03/24/25            Pt will increase pain free shoulder elevation PROM to >/= 160 deg to improve functional mobility of UE       Start:  02/24/25    Expected End:  03/24/25            Pt will increase shoulder ER PROM in 90 deg abduction to >/=20 deg to improve functional mobility of UE       Start:  02/24/25    Expected End:  03/24/25            Pt will increase elbow AAROM to WNL deg in 4 weeks to improve functional mobility of UE.       Start:  02/24/25    Expected End:  03/24/25                Delfino Quan, PT DPT

## 2025-05-15 ENCOUNTER — CLINICAL SUPPORT (OUTPATIENT)
Dept: REHABILITATION | Facility: HOSPITAL | Age: 70
End: 2025-05-15
Payer: MEDICARE

## 2025-05-15 DIAGNOSIS — R29.898 DECREASED STRENGTH OF UPPER EXTREMITY: ICD-10-CM

## 2025-05-15 DIAGNOSIS — M25.612 DECREASED RANGE OF MOTION OF LEFT SHOULDER: Primary | ICD-10-CM

## 2025-05-15 PROCEDURE — 97112 NEUROMUSCULAR REEDUCATION: CPT | Mod: HCNC

## 2025-05-15 PROCEDURE — 97140 MANUAL THERAPY 1/> REGIONS: CPT | Mod: HCNC

## 2025-05-15 NOTE — PROGRESS NOTES
Outpatient Rehab    Physical Therapy Visit    Patient Name: Ron Christina  MRN: [id number]  YOB: 1955  Encounter Date: 3/17/2025    Therapy Diagnosis:   Encounter Diagnoses   Name Primary?    Decreased range of motion of left shoulder Yes    Decreased strength of upper extremity          Physician: Alma Hathaway*    Physician Orders: Eval and Treat    Medical Diagnosis: Traumatic complete tear of left rotator cuff, initial encounter [S46.012A]      Visit # / Visits Authorized:  20 / 30   Date of Evaluation:  2/24/2025   Insurance Authorization Period: 2/11/2025 to 2/11/2026  Plan of Care Certification:  2/24/2025 to 5/19/2025      Time In:   8:45am  Time Out:  9:45am  Total Time:   60 min  Total Billable Time: 60 min    FOTO:  Intake Score:  %  Survey Score 1:  %  Survey Score 2:  %         Subjective         Pt reports shoulder has been a little more sore than normal recently after aggravating shoulder at home last week. He has been under 2/10 pain and has his full mobility.   Objective    S/p L RCR DOS: 2/21/2025.   Pt is 10wks, 5 days out as of 5/5/2025      Shoulder Passive Range of Motion: 5/15/2025  Shoulder Right Left   ER at 0    80 70   ER at 90    Not tested 2/2 post-op Not tested 2/2 post-op   IR at 90    Not tested 2/2 post-op Not tested 2/2 post-op       Shoulder Active Range of Motion: 5/15/2025  Shoulder Right Left   Flexion   155 Pre-140  Post-150          Treatment     Ron received the treatments listed below:      therapeutic exercises to develop strength, endurance, ROM, flexibility, posture, and core stabilization for 15 minutes including:  Supine dowel AAROM ER 30x (@30, and @60)  Supine dowel AAROM chest press to overhead elevation 30x (narrow and wide)  Arm bike for tissue extensibility 4 min fwd/ 4min bck    NT:  Wrist [id number]  Elbow arom 20x(pro, elke, sup)  Pendelum hangs 1 min 3x  manual therapy techniques: Joint mobilizations, Manual traction, Myofacial  release, Manual Lymphatic Drainage, Soft tissue Mobilization, and Friction Massage were applied to the: shoulder for 15 minutes, including:  Grade III post/inferior mobs  Gentle oscillations  Lat contract/relax  reassessment    neuromuscular re-education activities to improve: Balance, Coordination, Kinesthetic, Sense, Proprioception, and Posture for 30 minutes. The following activities were included:  Seated ER @ 90 3x10 1#    ER walkout GTB 2x10  IR walkout GTB 2x10    Wall slides 2x10  Wall ball RS series 20x each      NT:  Sidelying dowel assist flexion 2x15  Landmine dowel press 2x15      therapeutic activities to improve functional performance for 0  minutes, including:       Assessment & Plan   Assessment:    Pt presents with coninued excellent arom for this stage of rehab. Introduced loaded cuff interventions today with good tolerance. Min irritaition with endurance based wall ball rhythmic stab during the 3rd set 2/2 to compensatory patterns w/ fatigue. Excellent improvement in arom pre to post session. Will continue to progress as tolerated.       Patient will continue to benefit from skilled outpatient physical therapy to address the deficits listed in the problem list box on initial evaluation, provide pt/family education and to maximize pt's level of independence in the home and community environment.     Patient's spiritual, cultural, and educational needs considered and patient agreeable to plan of care and goals.           Plan:  Continue POC    Goals:   Active       Long Term Goals       Pt will improve FOTO score to </=see FOTO% limited to decrease perceived limitation with carrying, moving, and handling objects.       Start:  02/24/25    Expected End:  08/11/25            Pt will increase  shoulder AROM to WFL in all planes to improve functional use of R RUE.       Start:  02/24/25    Expected End:  08/11/25            Pt will improve  shoulder MMTs to = 4+/5 to promote equal use of B UEs in  performing functional tasks.       Start:  02/24/25    Expected End:  08/11/25            Pt will lift 10 lb objects without pain to promote functional QOL.       Start:  02/24/25    Expected End:  08/11/25            Pt to report pain </= 0/10       Start:  02/24/25    Expected End:  08/11/25               Short Term Goals        Pt will be independent with HEP to supplement PT in improving functional use of L UE.       Start:  02/24/25    Expected End:  03/24/25            Pt will increase pain free shoulder elevation PROM to >/= 160 deg to improve functional mobility of UE       Start:  02/24/25    Expected End:  03/24/25            Pt will increase shoulder ER PROM in 90 deg abduction to >/=20 deg to improve functional mobility of UE       Start:  02/24/25    Expected End:  03/24/25            Pt will increase elbow AAROM to WNL deg in 4 weeks to improve functional mobility of UE.       Start:  02/24/25    Expected End:  03/24/25                Delfino Quan, PT DPT

## 2025-05-20 ENCOUNTER — CLINICAL SUPPORT (OUTPATIENT)
Dept: REHABILITATION | Facility: HOSPITAL | Age: 70
End: 2025-05-20
Payer: MEDICARE

## 2025-05-20 DIAGNOSIS — R29.898 DECREASED STRENGTH OF UPPER EXTREMITY: ICD-10-CM

## 2025-05-20 DIAGNOSIS — M25.612 DECREASED RANGE OF MOTION OF LEFT SHOULDER: Primary | ICD-10-CM

## 2025-05-20 PROCEDURE — 97110 THERAPEUTIC EXERCISES: CPT | Mod: HCNC

## 2025-05-20 PROCEDURE — 97112 NEUROMUSCULAR REEDUCATION: CPT | Mod: HCNC

## 2025-05-20 NOTE — PROGRESS NOTES
Outpatient Rehab    Physical Therapy Visit    Patient Name: Ron Christina  MRN: [id number]  YOB: 1955  Encounter Date: 3/17/2025    Therapy Diagnosis:   Encounter Diagnoses   Name Primary?    Decreased range of motion of left shoulder Yes    Decreased strength of upper extremity          Physician: Alma Hathaway*    Physician Orders: Eval and Treat    Medical Diagnosis: Traumatic complete tear of left rotator cuff, initial encounter [S46.012A]      Visit # / Visits Authorized:  20 / 30   Date of Evaluation:  2/24/2025   Insurance Authorization Period: 2/11/2025 to 2/11/2026  Plan of Care Certification:  2/24/2025 to 5/19/2025      Time In:   10:00m  Time Out:  11:00am  Total Time:   60 min  Total Billable Time: 60 min    FOTO:  Intake Score:  %  Survey Score 1:  %  Survey Score 2:  %         Subjective         Pt reports shoulder has been feeling good.  Objective    S/p L RCR DOS: 2/21/2025.   Pt is 10wks, 5 days out as of 5/5/2025      Shoulder Passive Range of Motion: 5/15/2025  Shoulder Right Left   ER at 0    80 70   ER at 90    Not tested 2/2 post-op Not tested 2/2 post-op   IR at 90    Not tested 2/2 post-op Not tested 2/2 post-op       Shoulder Active Range of Motion: 5/20/2025  Shoulder Right Left   Flexion   155 Pre-150  Post-160          Treatment     Ron received the treatments listed below:      therapeutic exercises to develop strength, endurance, ROM, flexibility, posture, and core stabilization for 30 minutes including:  Supine dowel AAROM ER 30x (@30, and @60)  Supine dowel AAROM chest press to overhead elevation 30x (narrow and wide)  Arm bike for tissue extensibility 4 min fwd/ 4min bck    NT:  Wrist [id number]  Elbow arom 20x(pro, elke, sup)  Pendelum hangs 1 min 3x  manual therapy techniques: Joint mobilizations, Manual traction, Myofacial release, Manual Lymphatic Drainage, Soft tissue Mobilization, and Friction Massage were applied to the: shoulder for 15 minutes,  including:  Grade III post/inferior mobs  Gentle oscillations  Lat contract/relax  reassessment    neuromuscular re-education activities to improve: Balance, Coordination, Kinesthetic, Sense, Proprioception, and Posture for 15 minutes. The following activities were included:  Seated CC 7# pull down with eccentric emphasis  Wall ball RS series 20x eachx2      NT:  Sidelying dowel assist flexion 2x15  Landmine dowel press 2x15      therapeutic activities to improve functional performance for 0  minutes, including:       Assessment & Plan   Assessment:    Pt presents with coninued excellent arom for this stage of rehab. Improved tolerance to wall ball rhythmic stab from previous session. Required min verbal cues for LS compensatory pattern during cuff activation. Excellent improvement in arom pre to post session. Will continue to progress as tolerated.       Patient will continue to benefit from skilled outpatient physical therapy to address the deficits listed in the problem list box on initial evaluation, provide pt/family education and to maximize pt's level of independence in the home and community environment.     Patient's spiritual, cultural, and educational needs considered and patient agreeable to plan of care and goals.           Plan:  Continue POC    Goals:   Active       Long Term Goals       Pt will improve FOTO score to </=see FOTO% limited to decrease perceived limitation with carrying, moving, and handling objects.       Start:  02/24/25    Expected End:  08/11/25            Pt will increase  shoulder AROM to WFL in all planes to improve functional use of R RUE.       Start:  02/24/25    Expected End:  08/11/25            Pt will improve  shoulder MMTs to = 4+/5 to promote equal use of B UEs in performing functional tasks.       Start:  02/24/25    Expected End:  08/11/25            Pt will lift 10 lb objects without pain to promote functional QOL.       Start:  02/24/25    Expected End:  08/11/25             Pt to report pain </= 0/10       Start:  02/24/25    Expected End:  08/11/25               Short Term Goals        Pt will be independent with HEP to supplement PT in improving functional use of L UE.       Start:  02/24/25    Expected End:  03/24/25            Pt will increase pain free shoulder elevation PROM to >/= 160 deg to improve functional mobility of UE       Start:  02/24/25    Expected End:  03/24/25            Pt will increase shoulder ER PROM in 90 deg abduction to >/=20 deg to improve functional mobility of UE       Start:  02/24/25    Expected End:  03/24/25            Pt will increase elbow AAROM to WNL deg in 4 weeks to improve functional mobility of UE.       Start:  02/24/25    Expected End:  03/24/25                Delfino Quan, PT DPT

## 2025-05-22 ENCOUNTER — CLINICAL SUPPORT (OUTPATIENT)
Dept: REHABILITATION | Facility: HOSPITAL | Age: 70
End: 2025-05-22
Payer: MEDICARE

## 2025-05-22 DIAGNOSIS — R29.898 DECREASED STRENGTH OF UPPER EXTREMITY: ICD-10-CM

## 2025-05-22 DIAGNOSIS — M25.612 DECREASED RANGE OF MOTION OF LEFT SHOULDER: Primary | ICD-10-CM

## 2025-05-22 PROCEDURE — 97112 NEUROMUSCULAR REEDUCATION: CPT | Mod: HCNC

## 2025-05-22 PROCEDURE — 97140 MANUAL THERAPY 1/> REGIONS: CPT | Mod: HCNC

## 2025-05-22 NOTE — PROGRESS NOTES
Outpatient Rehab    Physical Therapy Progress Note : Updated Plan of Care    Patient Name: Ron Christina  MRN: [id number]  YOB: 1955  Encounter Date: 5/22/2025    Therapy Diagnosis:   Encounter Diagnoses   Name Primary?    Decreased range of motion of left shoulder Yes    Decreased strength of upper extremity      Physician: Alma Htahaway*    Physician Orders: Eval and Treat  Medical Diagnosis:     Visit # / Visits Authorized:  24 / 30  Insurance Authorization Period: 2/26/2025 to 7/10/2025  Date of Evaluation: 2/24/2025  Plan of Care Certification: 5/22/2025 to 8/14/2025       Time In:   8:55am  Time Out:  9:55am  Total Time (in minutes):   60  Total Billable Time (in minutes):  30    FOTO:  Intake Score:  %  Survey Score 2:  %  Survey Score 3:  %    Precautions:       Subjective          Shoulder is feeling good, a little soreness in tehba    Objective            Shoulder Passive Range of Motion: 5/15/2025  Shoulder Right Left   ER at 0    80 70   ER at 90    Not tested 2/2 post-op Not tested 2/2 post-op   IR at 90    Not tested 2/2 post-op Not tested 2/2 post-op       Shoulder Active Range of Motion: 5/22/2025  Shoulder Right Left   Flexion   155 Pre-155  Post-165        Treatment     Ron received the treatments listed below:      therapeutic exercises to develop strength, endurance, ROM, flexibility, posture, and core stabilization for 10 minutes including:  Arm bike for tissue extensibility 4 min fwd/ 4min bck    NT:  Wrist [id number]  Elbow arom 20x(pro, elke, sup)  Pendelum hangs 1 min 3x  manual therapy techniques: Joint mobilizations, Manual traction, Myofacial release, Manual Lymphatic Drainage, Soft tissue Mobilization, and Friction Massage were applied to the: shoulder for 15 minutes, including:  Grade III post/inferior mobs  Gentle oscillations  Lat contract/relax  reassessment    neuromuscular re-education activities to improve: Balance, Coordination, Kinesthetic, Sense,  Proprioception, and Posture for 35 minutes. The following activities were included:  Seated CC 10# pull down with eccentric emphasis 3x10  Wall ball RS series 20x eachx3  Seated ER 90/90 DB 2# 3x12    NT:  Sidelying dowel assist flexion 2x15  Landmine dowel press 2x15      therapeutic activities to improve functional performance for 0  minutes, including:       Time Entry(in minutes):       Assessment & Plan     The patient completed today's session as listed above. Continued excellent carryover in shoulder AROM with improvement session to session, and pre to post session. Doing excellent for this stage of rehab. Requires min verbal cues for perscapular control during cuff activation. Will continue to progress.    The patient will continue to benefit from skilled outpatient physical therapy in order to address the deficits listed in the problem list on the initial evaluation, provide patient and family education, and maximize the patients level of independence in the home and community environments.     The patient's spiritual, cultural, and educational needs were considered, and the patient is agreeable to the plan of care and goals.           Goals:   Active       Long Term Goals       Pt will improve FOTO score to </=see FOTO% limited to decrease perceived limitation with carrying, moving, and handling objects. (Progressing)       Start:  02/24/25    Expected End:  08/11/25            Pt will increase  shoulder AROM to WFL in all planes to improve functional use of R RUE. (Met)       Start:  02/24/25    Expected End:  08/11/25    Resolved:  05/22/25         Pt will improve  shoulder MMTs to = 4+/5 to promote equal use of B UEs in performing functional tasks. (Progressing)       Start:  02/24/25    Expected End:  08/11/25            Pt will lift 10 lb objects without pain to promote functional QOL. (Progressing)       Start:  02/24/25    Expected End:  08/11/25            Pt to report pain </= 0/10 (Progressing)        Start:  02/24/25    Expected End:  08/11/25              Resolved       Short Term Goals        Pt will be independent with HEP to supplement PT in improving functional use of L UE. (Met)       Start:  02/24/25    Expected End:  03/24/25    Resolved:  05/22/25         Pt will increase pain free shoulder elevation PROM to >/= 160 deg to improve functional mobility of UE (Met)       Start:  02/24/25    Expected End:  03/24/25    Resolved:  05/22/25         Pt will increase shoulder ER PROM in 90 deg abduction to >/=20 deg to improve functional mobility of UE (Met)       Start:  02/24/25    Expected End:  03/24/25    Resolved:  05/22/25         Pt will increase elbow AAROM to WNL deg in 4 weeks to improve functional mobility of UE. (Met)       Start:  02/24/25    Expected End:  03/24/25    Resolved:  05/22/25             Delfino Quan, PT

## 2025-05-27 ENCOUNTER — OFFICE VISIT (OUTPATIENT)
Dept: SPORTS MEDICINE | Facility: CLINIC | Age: 70
End: 2025-05-27
Payer: MEDICARE

## 2025-05-27 VITALS
WEIGHT: 240.19 LBS | HEART RATE: 75 BPM | SYSTOLIC BLOOD PRESSURE: 138 MMHG | HEIGHT: 73 IN | DIASTOLIC BLOOD PRESSURE: 92 MMHG | BODY MASS INDEX: 31.83 KG/M2

## 2025-05-27 DIAGNOSIS — Z98.890 S/P LEFT ROTATOR CUFF REPAIR: ICD-10-CM

## 2025-05-27 DIAGNOSIS — R29.898 WEAKNESS OF LEFT SHOULDER: Primary | ICD-10-CM

## 2025-05-27 PROCEDURE — 99213 OFFICE O/P EST LOW 20 MIN: CPT | Mod: HCNC,S$GLB,, | Performed by: ORTHOPAEDIC SURGERY

## 2025-05-27 PROCEDURE — 3008F BODY MASS INDEX DOCD: CPT | Mod: CPTII,HCNC,S$GLB, | Performed by: ORTHOPAEDIC SURGERY

## 2025-05-27 PROCEDURE — 1125F AMNT PAIN NOTED PAIN PRSNT: CPT | Mod: CPTII,HCNC,S$GLB, | Performed by: ORTHOPAEDIC SURGERY

## 2025-05-27 PROCEDURE — 1159F MED LIST DOCD IN RCRD: CPT | Mod: CPTII,HCNC,S$GLB, | Performed by: ORTHOPAEDIC SURGERY

## 2025-05-27 PROCEDURE — 3075F SYST BP GE 130 - 139MM HG: CPT | Mod: CPTII,HCNC,S$GLB, | Performed by: ORTHOPAEDIC SURGERY

## 2025-05-27 PROCEDURE — 99999 PR PBB SHADOW E&M-EST. PATIENT-LVL III: CPT | Mod: PBBFAC,HCNC,, | Performed by: ORTHOPAEDIC SURGERY

## 2025-05-27 PROCEDURE — 3080F DIAST BP >= 90 MM HG: CPT | Mod: CPTII,HCNC,S$GLB, | Performed by: ORTHOPAEDIC SURGERY

## 2025-05-27 PROCEDURE — 3288F FALL RISK ASSESSMENT DOCD: CPT | Mod: CPTII,HCNC,S$GLB, | Performed by: ORTHOPAEDIC SURGERY

## 2025-05-27 PROCEDURE — 1101F PT FALLS ASSESS-DOCD LE1/YR: CPT | Mod: CPTII,HCNC,S$GLB, | Performed by: ORTHOPAEDIC SURGERY

## 2025-05-29 ENCOUNTER — CLINICAL SUPPORT (OUTPATIENT)
Dept: REHABILITATION | Facility: HOSPITAL | Age: 70
End: 2025-05-29
Payer: MEDICARE

## 2025-05-29 DIAGNOSIS — R29.898 DECREASED STRENGTH OF UPPER EXTREMITY: ICD-10-CM

## 2025-05-29 DIAGNOSIS — M25.612 DECREASED RANGE OF MOTION OF LEFT SHOULDER: Primary | ICD-10-CM

## 2025-05-29 PROCEDURE — 97110 THERAPEUTIC EXERCISES: CPT | Mod: HCNC

## 2025-05-29 PROCEDURE — 97112 NEUROMUSCULAR REEDUCATION: CPT | Mod: HCNC

## 2025-05-29 PROCEDURE — 97140 MANUAL THERAPY 1/> REGIONS: CPT | Mod: HCNC

## 2025-05-29 NOTE — PROGRESS NOTES
Outpatient Rehab    Physical Therapy Visit    Patient Name: Ron Christina  MRN: [id number]  YOB: 1955  Encounter Date: 5/29/2025    Therapy Diagnosis:   Encounter Diagnoses   Name Primary?    Decreased range of motion of left shoulder Yes    Decreased strength of upper extremity      Physician: Alma Hathaway*    Physician Orders: Eval and Treat  Medical Diagnosis:     Visit # / Visits Authorized:  25 / 30  Insurance Authorization Period: 2/26/2025 to 7/10/2025  Date of Evaluation: 2/24/2025  Plan of Care Certification: 2/24/2025 to 8/14/2025      Time In:   9:00am  Time Out:  10:00am  Total Time (in minutes):   60  Total Billable Time (in minutes):  60    FOTO:  Intake Score:  %  Survey Score 2:  %  Survey Score 3:  %    Precautions:       Subjective          Shoulder is feeling good. Wasn't as good about doing his exercises the last few days since he's been on vacation    Objective          Shoulder Passive Range of Motion: 5/15/2025  Shoulder Right Left   ER at 0    80 70   ER at 90    Not tested 2/2 post-op Not tested 2/2 post-op   IR at 90    Not tested 2/2 post-op Not tested 2/2 post-op       Shoulder Active Range of Motion: 5/29/2025  Shoulder Right Left   Flexion   155 Pre-155  Post-166          Treatment     Ron received the treatments listed below:      therapeutic exercises to develop strength, endurance, ROM, flexibility, posture, and core stabilization for 8 minutes including:    Arm bike for tissue extensibility 4 min fwd/ 4min bck    NT:  Wrist [id number]  Elbow arom 20x(pro, elke, sup)  Pendelum hangs 1 min 3x  manual therapy techniques: Joint mobilizations, Manual traction, Myofacial release, Manual Lymphatic Drainage, Soft tissue Mobilization, and Friction Massage were applied to the: shoulder for 15 minutes, including:  Grade III post/inferior mobs  Gentle oscillations  Lat contract/relax  reassessment    neuromuscular re-education activities to improve: Balance,  Coordination, Kinesthetic, Sense, Proprioception, and Posture for 37 minutes. The following activities were included:  Seated CC 10# pull down with eccentric emphasis  OTB IR walkouts @ 90 FL 3x10  Seated ER 90/90 2# 3x10  Standing scaption series 1# [id number]    NT:  Sidelying dowel assist flexion 2x15  Landmine dowel press 2x15      therapeutic activities to improve functional performance for 0  minutes, including:       Time Entry(in minutes):       Assessment & Plan   Assessment:     Pt presents with coninued excellent arom for this stage of rehab. Progressed RC activation with good tolerance and appropriate muscle response. Excellent improvement in arom pre to post session. Will continue to progress as tolerated.     The patient will continue to benefit from skilled outpatient physical therapy in order to address the deficits listed in the problem list on the initial evaluation, provide patient and family education, and maximize the patients level of independence in the home and community environments.     The patient's spiritual, cultural, and educational needs were considered, and the patient is agreeable to the plan of care and goals.           Plan:   Continue POC    Goals:   Active       Long Term Goals       Pt will improve FOTO score to </=see FOTO% limited to decrease perceived limitation with carrying, moving, and handling objects. (Progressing)       Start:  02/24/25    Expected End:  08/11/25            Pt will increase  shoulder AROM to WFL in all planes to improve functional use of R RUE. (Met)       Start:  02/24/25    Expected End:  08/11/25    Resolved:  05/22/25         Pt will improve  shoulder MMTs to = 4+/5 to promote equal use of B UEs in performing functional tasks. (Progressing)       Start:  02/24/25    Expected End:  08/11/25            Pt will lift 10 lb objects without pain to promote functional QOL. (Progressing)       Start:  02/24/25    Expected End:  08/11/25            Pt to  report pain </= 0/10 (Progressing)       Start:  02/24/25    Expected End:  08/11/25              Resolved       Short Term Goals        Pt will be independent with HEP to supplement PT in improving functional use of L UE. (Met)       Start:  02/24/25    Expected End:  03/24/25    Resolved:  05/22/25         Pt will increase pain free shoulder elevation PROM to >/= 160 deg to improve functional mobility of UE (Met)       Start:  02/24/25    Expected End:  03/24/25    Resolved:  05/22/25         Pt will increase shoulder ER PROM in 90 deg abduction to >/=20 deg to improve functional mobility of UE (Met)       Start:  02/24/25    Expected End:  03/24/25    Resolved:  05/22/25         Pt will increase elbow AAROM to WNL deg in 4 weeks to improve functional mobility of UE. (Met)       Start:  02/24/25    Expected End:  03/24/25    Resolved:  05/22/25             Delfino Quan, PT

## 2025-06-02 RX ORDER — CELECOXIB 200 MG/1
CAPSULE ORAL
Qty: 30 CAPSULE | Refills: 1 | Status: SHIPPED | OUTPATIENT
Start: 2025-06-02

## 2025-06-03 ENCOUNTER — CLINICAL SUPPORT (OUTPATIENT)
Dept: REHABILITATION | Facility: HOSPITAL | Age: 70
End: 2025-06-03
Payer: MEDICARE

## 2025-06-03 DIAGNOSIS — R29.898 DECREASED STRENGTH OF UPPER EXTREMITY: ICD-10-CM

## 2025-06-03 DIAGNOSIS — M25.612 DECREASED RANGE OF MOTION OF LEFT SHOULDER: Primary | ICD-10-CM

## 2025-06-03 PROCEDURE — 97112 NEUROMUSCULAR REEDUCATION: CPT | Mod: HCNC

## 2025-06-03 PROCEDURE — 97110 THERAPEUTIC EXERCISES: CPT | Mod: HCNC

## 2025-06-05 ENCOUNTER — CLINICAL SUPPORT (OUTPATIENT)
Dept: REHABILITATION | Facility: HOSPITAL | Age: 70
End: 2025-06-05
Payer: MEDICARE

## 2025-06-05 DIAGNOSIS — M25.612 DECREASED RANGE OF MOTION OF LEFT SHOULDER: Primary | ICD-10-CM

## 2025-06-05 DIAGNOSIS — R29.898 DECREASED STRENGTH OF UPPER EXTREMITY: ICD-10-CM

## 2025-06-05 PROCEDURE — 97112 NEUROMUSCULAR REEDUCATION: CPT | Mod: HCNC

## 2025-06-05 PROCEDURE — 97110 THERAPEUTIC EXERCISES: CPT | Mod: HCNC

## 2025-06-05 PROCEDURE — 97140 MANUAL THERAPY 1/> REGIONS: CPT | Mod: HCNC

## 2025-06-12 ENCOUNTER — CLINICAL SUPPORT (OUTPATIENT)
Dept: REHABILITATION | Facility: HOSPITAL | Age: 70
End: 2025-06-12
Payer: MEDICARE

## 2025-06-12 DIAGNOSIS — M25.612 DECREASED RANGE OF MOTION OF LEFT SHOULDER: Primary | ICD-10-CM

## 2025-06-12 DIAGNOSIS — R29.898 DECREASED STRENGTH OF UPPER EXTREMITY: ICD-10-CM

## 2025-06-12 PROCEDURE — 97112 NEUROMUSCULAR REEDUCATION: CPT | Mod: HCNC

## 2025-06-12 PROCEDURE — 97140 MANUAL THERAPY 1/> REGIONS: CPT | Mod: HCNC

## 2025-06-12 PROCEDURE — 97110 THERAPEUTIC EXERCISES: CPT | Mod: HCNC

## 2025-06-12 NOTE — PROGRESS NOTES
Outpatient Rehab    Physical Therapy Visit    Patient Name: Ron Christina  MRN: [id number]  YOB: 1955  Encounter Date: 6/12/2025    Therapy Diagnosis:   Encounter Diagnoses   Name Primary?    Decreased range of motion of left shoulder Yes    Decreased strength of upper extremity      Physician: Alma Hathaway*    Physician Orders: Eval and Treat  Medical Diagnosis:     Visit # / Visits Authorized:  28 / 40  Insurance Authorization Period: 2/26/2025 to 12/31/2025  Date of Evaluation: 2/24/2025  Plan of Care Certification: 2/24/2025 to 8/14/2025      Time In:   10:00am  Time Out:  11:00am  Total Time (in minutes):   60  Total Billable Time (in minutes):  60    FOTO:  Intake Score:  %  Survey Score 2:  %  Survey Score 3:  %    Precautions:       Subjective          Shoulder is feeling good. No changes     Objective          Shoulder Passive Range of Motion: 5/15/2025  Shoulder Right Left   ER at 0    80 70   ER at 90    Not tested 2/2 post-op Not tested 2/2 post-op   IR at 90    Not tested 2/2 post-op Not tested 2/2 post-op       Shoulder Active Range of Motion:6/12/2025  Shoulder Right Left   Flexion   170 Pre-160  Post-170          Treatment     oRn received the treatments listed below:      therapeutic exercises to develop strength, endurance, ROM, flexibility, posture, and core stabilization for 8 minutes including:    Arm bike for tissue extensibility 4 min fwd/ 4min bck    NT:  Wrist [id number]  Elbow arom 20x(pro, elke, sup)  Pendelum hangs 1 min 3x  manual therapy techniques: Joint mobilizations, Manual traction, Myofacial release, Manual Lymphatic Drainage, Soft tissue Mobilization, and Friction Massage were applied to the: shoulder for 15 minutes, including:  Grade III post/inferior mobs  Gentle oscillations  Lat contract/relax  reassessment    neuromuscular re-education activities to improve: Balance, Coordination, Kinesthetic, Sense, Proprioception, and Posture for 37 minutes. The  following activities were included:  Seated CC 10# pull down with eccentric emphasis 3x12  OTB iso ER w/ flexion uppercut 3x12  GTB iso IRw/ flexion uppercut 3x12    Wall ball rythmic stab 500g 3x20 each (up/down, side/side, CW, CCW)    NT:  Sidelying dowel assist flexion 2x15  Landmine dowel press 2x15      therapeutic activities to improve functional performance for 0  minutes, including:       Time Entry(in minutes):       Assessment & Plan   Assessment:     Pt presents with coninued excellent arom for this stage of rehab. Presented with irritbility levels back to baseline. Progressed RC activation volume with good tolerance. Required min verbal cues for compensatory patterns with flexion raises. Excellent improvement in arom pre to post session. Will continue to progress as tolerated.     The patient will continue to benefit from skilled outpatient physical therapy in order to address the deficits listed in the problem list on the initial evaluation, provide patient and family education, and maximize the patients level of independence in the home and community environments.     The patient's spiritual, cultural, and educational needs were considered, and the patient is agreeable to the plan of care and goals.           Plan:   Continue POC    Goals:   Active       Long Term Goals       Pt will improve FOTO score to </=see FOTO% limited to decrease perceived limitation with carrying, moving, and handling objects. (Progressing)       Start:  02/24/25    Expected End:  08/11/25            Pt will increase  shoulder AROM to WFL in all planes to improve functional use of R RUE. (Met)       Start:  02/24/25    Expected End:  08/11/25    Resolved:  05/22/25         Pt will improve  shoulder MMTs to = 4+/5 to promote equal use of B UEs in performing functional tasks. (Progressing)       Start:  02/24/25    Expected End:  08/11/25            Pt will lift 10 lb objects without pain to promote functional QOL.  (Progressing)       Start:  02/24/25    Expected End:  08/11/25            Pt to report pain </= 0/10 (Progressing)       Start:  02/24/25    Expected End:  08/11/25              Resolved       Short Term Goals        Pt will be independent with HEP to supplement PT in improving functional use of L UE. (Met)       Start:  02/24/25    Expected End:  03/24/25    Resolved:  05/22/25         Pt will increase pain free shoulder elevation PROM to >/= 160 deg to improve functional mobility of UE (Met)       Start:  02/24/25    Expected End:  03/24/25    Resolved:  05/22/25         Pt will increase shoulder ER PROM in 90 deg abduction to >/=20 deg to improve functional mobility of UE (Met)       Start:  02/24/25    Expected End:  03/24/25    Resolved:  05/22/25         Pt will increase elbow AAROM to WNL deg in 4 weeks to improve functional mobility of UE. (Met)       Start:  02/24/25    Expected End:  03/24/25    Resolved:  05/22/25             Delfino Quan, PT

## 2025-06-17 ENCOUNTER — CLINICAL SUPPORT (OUTPATIENT)
Dept: REHABILITATION | Facility: HOSPITAL | Age: 70
End: 2025-06-17
Payer: MEDICARE

## 2025-06-17 DIAGNOSIS — M25.612 DECREASED RANGE OF MOTION OF LEFT SHOULDER: Primary | ICD-10-CM

## 2025-06-17 DIAGNOSIS — R29.898 DECREASED STRENGTH OF UPPER EXTREMITY: ICD-10-CM

## 2025-06-17 PROCEDURE — 97112 NEUROMUSCULAR REEDUCATION: CPT | Mod: HCNC

## 2025-06-17 PROCEDURE — 97140 MANUAL THERAPY 1/> REGIONS: CPT | Mod: HCNC

## 2025-06-17 PROCEDURE — 97110 THERAPEUTIC EXERCISES: CPT | Mod: HCNC

## 2025-06-17 NOTE — PROGRESS NOTES
Outpatient Rehab    Physical Therapy Visit    Patient Name: Ron Christina  MRN: [id number]  YOB: 1955  Encounter Date: 6/17/2025    Therapy Diagnosis:   Encounter Diagnoses   Name Primary?    Decreased range of motion of left shoulder Yes    Decreased strength of upper extremity      Physician: Alma Hathaway*    Physician Orders: Eval and Treat  Medical Diagnosis:     Visit # / Visits Authorized:  29 / 40  Insurance Authorization Period: 2/26/2025 to 12/31/2025  Date of Evaluation: 2/24/2025  Plan of Care Certification: 2/24/2025 to 8/14/2025      Time In:   8:00am  Time Out:  9:00am  Total Time (in minutes):   60  Total Billable Time (in minutes):  60    FOTO:  Intake Score:  %  Survey Score 2:  %  Survey Score 3:  %    Precautions:       Subjective          Shoulder is feeling good. Doesn't really have that nagging mild pain anymore. Didn't do his exercises as much because he was busy for fathers day    Objective          Shoulder Passive Range of Motion: 5/15/2025  Shoulder Right Left   ER at 0    80 70   ER at 90    Not tested 2/2 post-op Not tested 2/2 post-op   IR at 90    Not tested 2/2 post-op Not tested 2/2 post-op       Shoulder Active Range of Motion:6/12/2025  Shoulder Right Left   Flexion   170 Pre-160  Post-170          Treatment     Ron received the treatments listed below:      therapeutic exercises to develop strength, endurance, ROM, flexibility, posture, and core stabilization for 8 minutes including:    Arm bike for tissue extensibility 4 min fwd/ 4min bck    NT:  Wrist [id number]  Elbow arom 20x(pro, elke, sup)  Pendelum hangs 1 min 3x  manual therapy techniques: Joint mobilizations, Manual traction, Myofacial release, Manual Lymphatic Drainage, Soft tissue Mobilization, and Friction Massage were applied to the: shoulder for 15 minutes, including:  Grade III post/inferior mobs  Gentle oscillations  Lat contract/relax  reassessment    neuromuscular re-education activities  to improve: Balance, Coordination, Kinesthetic, Sense, Proprioception, and Posture for 37 minutes. The following activities were included:  Seated CC 10# pull down with eccentric emphasis 3x12  OTB iso ER w/ flexion uppercut 3x12  GTB iso IRw/ flexion uppercut 3x12    Wall ball rythmic stab 500g 3x20 each (up/down, side/side, CW, CCW)    NT:  Sidelying dowel assist flexion 2x15  Landmine dowel press 2x15      therapeutic activities to improve functional performance for 0  minutes, including:       Time Entry(in minutes):       Assessment & Plan   Assessment:     Pt presents with coninued excellent arom for this stage of rehab. Presented with irritbility levels back to baseline. Progressed RC activation volume with good tolerance. Required min verbal cues for compensatory patterns with flexion raises. Excellent improvement in arom pre to post session. Will continue to progress as tolerated.     The patient will continue to benefit from skilled outpatient physical therapy in order to address the deficits listed in the problem list on the initial evaluation, provide patient and family education, and maximize the patients level of independence in the home and community environments.     The patient's spiritual, cultural, and educational needs were considered, and the patient is agreeable to the plan of care and goals.           Plan:   Continue POC    Goals:   Active       Long Term Goals       Pt will improve FOTO score to </=see FOTO% limited to decrease perceived limitation with carrying, moving, and handling objects. (Progressing)       Start:  02/24/25    Expected End:  08/11/25            Pt will increase  shoulder AROM to WFL in all planes to improve functional use of R RUE. (Met)       Start:  02/24/25    Expected End:  08/11/25    Resolved:  05/22/25         Pt will improve  shoulder MMTs to = 4+/5 to promote equal use of B UEs in performing functional tasks. (Progressing)       Start:  02/24/25    Expected  End:  08/11/25            Pt will lift 10 lb objects without pain to promote functional QOL. (Progressing)       Start:  02/24/25    Expected End:  08/11/25            Pt to report pain </= 0/10 (Progressing)       Start:  02/24/25    Expected End:  08/11/25              Resolved       Short Term Goals        Pt will be independent with HEP to supplement PT in improving functional use of L UE. (Met)       Start:  02/24/25    Expected End:  03/24/25    Resolved:  05/22/25         Pt will increase pain free shoulder elevation PROM to >/= 160 deg to improve functional mobility of UE (Met)       Start:  02/24/25    Expected End:  03/24/25    Resolved:  05/22/25         Pt will increase shoulder ER PROM in 90 deg abduction to >/=20 deg to improve functional mobility of UE (Met)       Start:  02/24/25    Expected End:  03/24/25    Resolved:  05/22/25         Pt will increase elbow AAROM to WNL deg in 4 weeks to improve functional mobility of UE. (Met)       Start:  02/24/25    Expected End:  03/24/25    Resolved:  05/22/25             Delfino Quan, PT

## 2025-06-19 ENCOUNTER — CLINICAL SUPPORT (OUTPATIENT)
Dept: REHABILITATION | Facility: HOSPITAL | Age: 70
End: 2025-06-19
Payer: MEDICARE

## 2025-06-19 DIAGNOSIS — M25.612 DECREASED RANGE OF MOTION OF LEFT SHOULDER: Primary | ICD-10-CM

## 2025-06-19 DIAGNOSIS — R29.898 DECREASED STRENGTH OF UPPER EXTREMITY: ICD-10-CM

## 2025-06-19 PROCEDURE — 97112 NEUROMUSCULAR REEDUCATION: CPT | Mod: HCNC

## 2025-06-19 PROCEDURE — 97110 THERAPEUTIC EXERCISES: CPT | Mod: HCNC

## 2025-06-19 NOTE — PROGRESS NOTES
Outpatient Rehab    Physical Therapy Visit    Patient Name: Ron Christina  MRN: [id number]  YOB: 1955  Encounter Date: 6/19/2025    Therapy Diagnosis:   Encounter Diagnoses   Name Primary?    Decreased range of motion of left shoulder Yes    Decreased strength of upper extremity      Physician: Alma Hathaway*    Physician Orders: Eval and Treat  Medical Diagnosis:     Visit # / Visits Authorized:  30 / 40  Insurance Authorization Period: 2/26/2025 to 12/31/2025  Date of Evaluation: 2/24/2025  Plan of Care Certification: 2/24/2025 to 8/14/2025      Time In:   8:45 am  Time Out:  9:45am  Total Time (in minutes):   60  Total Billable Time (in minutes):  42    FOTO:  Intake Score:  %  Survey Score 2:  %  Survey Score 3:  %    Precautions:       Subjective          Shoulder is feeling good.    Objective          Shoulder Passive Range of Motion: 6/19/2025  Shoulder Right Left   ER at 0    85 79   ER at 90    110 85   IR at 90    80 75       Shoulder Active Range of Motion:6/19/2025  Shoulder Right Left   Flexion   170 Pre-160  Post-170   STACI T4 T1   FIR T7 T12              Treatment     Ron received the treatments listed below:      therapeutic exercises to develop strength, endurance, ROM, flexibility, posture, and core stabilization for 8 minutes including:    Arm bike for tissue extensibility 4 min fwd/ 4min bck    NT:  Wrist [id number]  Elbow arom 20x(pro, elke, sup)  Pendelum hangs 1 min 3x  manual therapy techniques: Joint mobilizations, Manual traction, Myofacial release, Manual Lymphatic Drainage, Soft tissue Mobilization, and Friction Massage were applied to the: shoulder for 15 minutes, including:  Grade III post/inferior mobs  Gentle oscillations  Lat contract/relax  reassessment    neuromuscular re-education activities to improve: Balance, Coordination, Kinesthetic, Sense, Proprioception, and Posture for 37 minutes. The following activities were included:  Seated CC 10# pull down with  eccentric emphasis 3x12  OTB iso ER w/ flexion uppercut 3x12  GTB genie IR walkouts  3x10    Wall ball rythmic stab 500g 3x20 each (up/down, side/side, CW, CCW)    NT:  Sidelying dowel assist flexion 2x15  Landmine dowel press 2x15      therapeutic activities to improve functional performance for 0  minutes, including:       Time Entry(in minutes):       Assessment & Plan   Assessment:     Pt presents with coninued excellent arom for this stage of rehab. Poor carryover from previous session, but excellent improvement pre to post session. Progressed RC activation volume with good tolerance. Required min verbal cues for compensatory patterns with flexion raises. Will continue to progress as tolerated.     The patient will continue to benefit from skilled outpatient physical therapy in order to address the deficits listed in the problem list on the initial evaluation, provide patient and family education, and maximize the patients level of independence in the home and community environments.     The patient's spiritual, cultural, and educational needs were considered, and the patient is agreeable to the plan of care and goals.           Plan:   Continue POC    Goals:   Active       Long Term Goals       Pt will improve FOTO score to </=see FOTO% limited to decrease perceived limitation with carrying, moving, and handling objects. (Progressing)       Start:  02/24/25    Expected End:  08/11/25            Pt will increase  shoulder AROM to WFL in all planes to improve functional use of R RUE. (Met)       Start:  02/24/25    Expected End:  08/11/25    Resolved:  05/22/25         Pt will improve  shoulder MMTs to = 4+/5 to promote equal use of B UEs in performing functional tasks. (Progressing)       Start:  02/24/25    Expected End:  08/11/25            Pt will lift 10 lb objects without pain to promote functional QOL. (Progressing)       Start:  02/24/25    Expected End:  08/11/25            Pt to report pain </= 0/10  (Progressing)       Start:  02/24/25    Expected End:  08/11/25              Resolved       Short Term Goals        Pt will be independent with HEP to supplement PT in improving functional use of L UE. (Met)       Start:  02/24/25    Expected End:  03/24/25    Resolved:  05/22/25         Pt will increase pain free shoulder elevation PROM to >/= 160 deg to improve functional mobility of UE (Met)       Start:  02/24/25    Expected End:  03/24/25    Resolved:  05/22/25         Pt will increase shoulder ER PROM in 90 deg abduction to >/=20 deg to improve functional mobility of UE (Met)       Start:  02/24/25    Expected End:  03/24/25    Resolved:  05/22/25         Pt will increase elbow AAROM to WNL deg in 4 weeks to improve functional mobility of UE. (Met)       Start:  02/24/25    Expected End:  03/24/25    Resolved:  05/22/25             Delfino Quan, PT

## 2025-06-23 ENCOUNTER — CLINICAL SUPPORT (OUTPATIENT)
Dept: REHABILITATION | Facility: HOSPITAL | Age: 70
End: 2025-06-23
Payer: MEDICARE

## 2025-06-23 DIAGNOSIS — M25.612 DECREASED RANGE OF MOTION OF LEFT SHOULDER: Primary | ICD-10-CM

## 2025-06-23 DIAGNOSIS — R29.898 DECREASED STRENGTH OF UPPER EXTREMITY: ICD-10-CM

## 2025-06-23 PROCEDURE — 97110 THERAPEUTIC EXERCISES: CPT | Mod: HCNC

## 2025-06-23 PROCEDURE — 97112 NEUROMUSCULAR REEDUCATION: CPT | Mod: HCNC

## 2025-06-23 NOTE — PROGRESS NOTES
Outpatient Rehab    Physical Therapy Visit    Patient Name: Ron Christina  MRN: [id number]  YOB: 1955  Encounter Date: 6/23/2025    Therapy Diagnosis:   Encounter Diagnoses   Name Primary?    Decreased range of motion of left shoulder Yes    Decreased strength of upper extremity      Physician: Alma Hathaway*    Physician Orders: Eval and Treat  Medical Diagnosis:     Visit # / Visits Authorized:  31 / 40  Insurance Authorization Period: 2/26/2025 to 12/31/2025  Date of Evaluation: 2/24/2025  Plan of Care Certification: 2/24/2025 to 8/14/2025      Time In:   7:45 am  Time Out:  8:45am  Total Time (in minutes):   60  Total Billable Time (in minutes):  30    FOTO:  Intake Score:  %  Survey Score 2:  %  Survey Score 3:  %    Precautions:       Subjective          Shoulder is feeling good. I think I slept on it wrong, it's a little sore on the back of the shoulder blade    Objective          Shoulder Passive Range of Motion: 6/19/2025  Shoulder Right Left   ER at 0    85 79   ER at 90    110 85   IR at 90    80 75       Shoulder Active Range of Motion:6/19/2025  Shoulder Right Left   Flexion   170 Pre-160  Post-170   STACI T4 T1   FIR T7 T12              Treatment     Ron received the treatments listed below:      therapeutic exercises to develop strength, endurance, ROM, flexibility, posture, and core stabilization for 8 minutes including:    Arm bike for tissue extensibility 4 min fwd/ 4min bck    NT:  Wrist [id number]  Elbow arom 20x(pro, elke, sup)  Pendelum hangs 1 min 3x  manual therapy techniques: Joint mobilizations, Manual traction, Myofacial release, Manual Lymphatic Drainage, Soft tissue Mobilization, and Friction Massage were applied to the: shoulder for 15 minutes, including:  Grade III post/inferior mobs  Gentle oscillations  Lat contract/relax  reassessment    neuromuscular re-education activities to improve: Balance, Coordination, Kinesthetic, Sense, Proprioception, and Posture for  37 minutes. The following activities were included:  Seated CC 10# pull down with eccentric emphasis 3x12  Landmine Press 5# 4x15  OTB genie ER 3x10  GTB genie IR walkouts  3x12    Wall ball rythmic stab 500g 3x20 each (up/down, side/side, CW, CCW)    NT:  Sidelying dowel assist flexion 2x15  Landmine dowel press 2x15      therapeutic activities to improve functional performance for 0  minutes, including:       Time Entry(in minutes):       Assessment & Plan   Assessment:     Pt presents with coninued excellent arom for this stage of rehab. Poor carryover from previous session, but excellent improvement pre to post session. Progressed RC activation volume with good tolerance. Required min verbal cues for compensatory patterns with flexion raises. Will continue to progress as tolerated.     The patient will continue to benefit from skilled outpatient physical therapy in order to address the deficits listed in the problem list on the initial evaluation, provide patient and family education, and maximize the patients level of independence in the home and community environments.     The patient's spiritual, cultural, and educational needs were considered, and the patient is agreeable to the plan of care and goals.           Plan:   Continue POC    Goals:   Active       Long Term Goals       Pt will improve FOTO score to </=see FOTO% limited to decrease perceived limitation with carrying, moving, and handling objects. (Progressing)       Start:  02/24/25    Expected End:  08/11/25            Pt will increase  shoulder AROM to WFL in all planes to improve functional use of R RUE. (Met)       Start:  02/24/25    Expected End:  08/11/25    Resolved:  05/22/25         Pt will improve  shoulder MMTs to = 4+/5 to promote equal use of B UEs in performing functional tasks. (Progressing)       Start:  02/24/25    Expected End:  08/11/25            Pt will lift 10 lb objects without pain to promote functional QOL. (Progressing)        Start:  02/24/25    Expected End:  08/11/25            Pt to report pain </= 0/10 (Progressing)       Start:  02/24/25    Expected End:  08/11/25              Resolved       Short Term Goals        Pt will be independent with HEP to supplement PT in improving functional use of L UE. (Met)       Start:  02/24/25    Expected End:  03/24/25    Resolved:  05/22/25         Pt will increase pain free shoulder elevation PROM to >/= 160 deg to improve functional mobility of UE (Met)       Start:  02/24/25    Expected End:  03/24/25    Resolved:  05/22/25         Pt will increase shoulder ER PROM in 90 deg abduction to >/=20 deg to improve functional mobility of UE (Met)       Start:  02/24/25    Expected End:  03/24/25    Resolved:  05/22/25         Pt will increase elbow AAROM to WNL deg in 4 weeks to improve functional mobility of UE. (Met)       Start:  02/24/25    Expected End:  03/24/25    Resolved:  05/22/25             Delfino Quan, PT

## 2025-06-30 ENCOUNTER — CLINICAL SUPPORT (OUTPATIENT)
Dept: REHABILITATION | Facility: HOSPITAL | Age: 70
End: 2025-06-30
Payer: MEDICARE

## 2025-06-30 DIAGNOSIS — M25.612 DECREASED RANGE OF MOTION OF LEFT SHOULDER: Primary | ICD-10-CM

## 2025-06-30 DIAGNOSIS — R29.898 DECREASED STRENGTH OF UPPER EXTREMITY: ICD-10-CM

## 2025-06-30 PROCEDURE — 97110 THERAPEUTIC EXERCISES: CPT | Mod: HCNC

## 2025-06-30 PROCEDURE — 97112 NEUROMUSCULAR REEDUCATION: CPT | Mod: HCNC

## 2025-06-30 NOTE — PROGRESS NOTES
Outpatient Rehab    Physical Therapy Visit    Patient Name: Ron Christina  MRN: [id number]  YOB: 1955  Encounter Date: 6/30/2025    Therapy Diagnosis:   Encounter Diagnoses   Name Primary?    Decreased range of motion of left shoulder Yes    Decreased strength of upper extremity      Physician: Alma Hathaway*    Physician Orders: Eval and Treat  Medical Diagnosis:     Visit # / Visits Authorized:  32 / 40  Insurance Authorization Period: 2/26/2025 to 12/31/2025  Date of Evaluation: 2/24/2025  Plan of Care Certification: 2/24/2025 to 8/14/2025      Time In:   7:45 am  Time Out:  8:45am  Total Time (in minutes):   60  Total Billable Time (in minutes):  30    FOTO:  Intake Score:  %  Survey Score 2:  %  Survey Score 3:  %    Precautions:       Subjective          Shoulder is feeling good. I did a lot of small things around the house this weekend and felt great.    Objective          Shoulder Passive Range of Motion: 6/19/2025  Shoulder Right Left   ER at 0    85 79   ER at 90    110 85   IR at 90    80 75       Shoulder Active Range of Motion:6/19/2025  Shoulder Right Left   Flexion   170 Pre-160  Post-170   STACI T4 T1   FIR T7 T12              Treatment     Ron received the treatments listed below:      therapeutic exercises to develop strength, endurance, ROM, flexibility, posture, and core stabilization for 8 minutes including:    Arm bike for tissue extensibility 4 min fwd/ 4min bck    NT:  Wrist [id number]  Elbow arom 20x(pro, elke, sup)  Pendelum hangs 1 min 3x  manual therapy techniques: Joint mobilizations, Manual traction, Myofacial release, Manual Lymphatic Drainage, Soft tissue Mobilization, and Friction Massage were applied to the: shoulder for 15 minutes, including:  Grade III post/inferior mobs  Gentle oscillations  Lat contract/relax  reassessment    neuromuscular re-education activities to improve: Balance, Coordination, Kinesthetic, Sense, Proprioception, and Posture for 37  minutes. The following activities were included:  Seated CC 10# pull down with eccentric emphasis 3x12  Landmine Press training bar# 3x12  OTB genie ER 3x10  GTB genie IR walkouts  3x12  TB shoulder extension GTB 3x10    Wall ball rythmic stab 500g 3x20 each (up/down, side/side, CW, CCW)-NT    NT:  Sidelying dowel assist flexion 2x15  Landmine dowel press 2x15      therapeutic activities to improve functional performance for 0  minutes, including:       Time Entry(in minutes):       Assessment & Plan   Assessment:     Pt presents with coninued excellent arom for this stage of rehab. Poor carryover from previous session, but excellent improvement pre to post session. Progressed RC activation volume with good tolerance. Required min verbal cues for compensatory patterns with flexion raises. Required min verbal cues for scapular control during TB shoulder extension. Will continue to progress as tolerated.     The patient will continue to benefit from skilled outpatient physical therapy in order to address the deficits listed in the problem list on the initial evaluation, provide patient and family education, and maximize the patients level of independence in the home and community environments.     The patient's spiritual, cultural, and educational needs were considered, and the patient is agreeable to the plan of care and goals.           Plan:   Continue POC    Goals:   Active       Long Term Goals       Pt will improve FOTO score to </=see FOTO% limited to decrease perceived limitation with carrying, moving, and handling objects. (Progressing)       Start:  02/24/25    Expected End:  08/11/25            Pt will increase  shoulder AROM to WFL in all planes to improve functional use of R RUE. (Met)       Start:  02/24/25    Expected End:  08/11/25    Resolved:  05/22/25         Pt will improve  shoulder MMTs to = 4+/5 to promote equal use of B UEs in performing functional tasks. (Progressing)       Start:  02/24/25     Expected End:  08/11/25            Pt will lift 10 lb objects without pain to promote functional QOL. (Progressing)       Start:  02/24/25    Expected End:  08/11/25            Pt to report pain </= 0/10 (Progressing)       Start:  02/24/25    Expected End:  08/11/25              Resolved       Short Term Goals        Pt will be independent with HEP to supplement PT in improving functional use of L UE. (Met)       Start:  02/24/25    Expected End:  03/24/25    Resolved:  05/22/25         Pt will increase pain free shoulder elevation PROM to >/= 160 deg to improve functional mobility of UE (Met)       Start:  02/24/25    Expected End:  03/24/25    Resolved:  05/22/25         Pt will increase shoulder ER PROM in 90 deg abduction to >/=20 deg to improve functional mobility of UE (Met)       Start:  02/24/25    Expected End:  03/24/25    Resolved:  05/22/25         Pt will increase elbow AAROM to WNL deg in 4 weeks to improve functional mobility of UE. (Met)       Start:  02/24/25    Expected End:  03/24/25    Resolved:  05/22/25             Delfino Quan, PT

## 2025-07-07 ENCOUNTER — CLINICAL SUPPORT (OUTPATIENT)
Dept: REHABILITATION | Facility: HOSPITAL | Age: 70
End: 2025-07-07
Payer: MEDICARE

## 2025-07-07 DIAGNOSIS — M25.612 DECREASED RANGE OF MOTION OF LEFT SHOULDER: Primary | ICD-10-CM

## 2025-07-07 DIAGNOSIS — R29.898 DECREASED STRENGTH OF UPPER EXTREMITY: ICD-10-CM

## 2025-07-07 PROCEDURE — 97112 NEUROMUSCULAR REEDUCATION: CPT | Mod: HCNC

## 2025-07-07 PROCEDURE — 97140 MANUAL THERAPY 1/> REGIONS: CPT | Mod: HCNC

## 2025-07-07 NOTE — PROGRESS NOTES
Outpatient Rehab    Physical Therapy Visit    Patient Name: Ron Christina  MRN: [id number]  YOB: 1955  Encounter Date: 7/7/2025    Therapy Diagnosis:   Encounter Diagnoses   Name Primary?    Decreased range of motion of left shoulder Yes    Decreased strength of upper extremity      Physician: Alma Hathaway*    Physician Orders: Eval and Treat  Medical Diagnosis:     Visit # / Visits Authorized:  33 / 40  Insurance Authorization Period: 2/26/2025 to 12/31/2025  Date of Evaluation: 2/24/2025  Plan of Care Certification: 2/24/2025 to 8/14/2025      Time In:   7:45 am  Time Out:  8:45am  Total Time (in minutes):   60  Total Billable Time (in minutes):  30    FOTO:  Intake Score:  %  Survey Score 2:  %  Survey Score 3:  %    Precautions:       Subjective          Shoulder not the best this morning. He was at the beach this weekend and hasn't been doing his exercises.    Objective          Shoulder Passive Range of Motion: 6/19/2025  Shoulder Right Left   ER at 0    85 79   ER at 90    110 85   IR at 90    80 75       Shoulder Active Range of Motion:7/7/2025  Shoulder Right Left   Flexion   170 Pre-160  Post-170   STACI T4 T1   FIR T7 T12              Treatment     Ron received the treatments listed below:      therapeutic exercises to develop strength, endurance, ROM, flexibility, posture, and core stabilization for 8 minutes including:    Arm bike for tissue extensibility 4 min fwd/ 4min bck    NT:  Wrist [id number]  Elbow arom 20x(pro, elke, sup)  Pendelum hangs 1 min 3x  manual therapy techniques: Joint mobilizations, Manual traction, Myofacial release, Manual Lymphatic Drainage, Soft tissue Mobilization, and Friction Massage were applied to the: shoulder for 15 minutes, including:  Grade III post/inferior mobs  Gentle oscillations  Lat contract/relax  reassessment    neuromuscular re-education activities to improve: Balance, Coordination, Kinesthetic, Sense, Proprioception, and Posture for 37  minutes. The following activities were included:  Seated CC 10# pull down with eccentric emphasis 3x12  Landmine Press training bar# 3x12  OTB genie ER 3x10  GTB genie IR walkouts  3x12  Inverted KB  carries 5# 3 laps    Wall ball rythmic stab 500g 3x20 each (up/down, side/side, CW, CCW)-NT    NT:  Sidelying dowel assist flexion 2x15  Landmine dowel press 2x15      therapeutic activities to improve functional performance for 0  minutes, including:       Time Entry(in minutes):       Assessment & Plan   Assessment:     Pt presents with coninued excellent arom for this stage of rehab. Poor carryover from previous session, but excellent improvement pre to post session. Progressed RC activation volume with good tolerance. Required min verbal cues for compensatory patterns with flexion raises. Required min verbal cues for scapular control during TB shoulder extension. Will continue to progress as tolerated.     The patient will continue to benefit from skilled outpatient physical therapy in order to address the deficits listed in the problem list on the initial evaluation, provide patient and family education, and maximize the patients level of independence in the home and community environments.     The patient's spiritual, cultural, and educational needs were considered, and the patient is agreeable to the plan of care and goals.           Plan:   Continue POC    Goals:   Active       Long Term Goals       Pt will improve FOTO score to </=see FOTO% limited to decrease perceived limitation with carrying, moving, and handling objects. (Progressing)       Start:  02/24/25    Expected End:  08/11/25            Pt will increase  shoulder AROM to WFL in all planes to improve functional use of R RUE. (Met)       Start:  02/24/25    Expected End:  08/11/25    Resolved:  05/22/25         Pt will improve  shoulder MMTs to = 4+/5 to promote equal use of B UEs in performing functional tasks. (Progressing)       Start:   02/24/25    Expected End:  08/11/25            Pt will lift 10 lb objects without pain to promote functional QOL. (Progressing)       Start:  02/24/25    Expected End:  08/11/25            Pt to report pain </= 0/10 (Progressing)       Start:  02/24/25    Expected End:  08/11/25              Resolved       Short Term Goals        Pt will be independent with HEP to supplement PT in improving functional use of L UE. (Met)       Start:  02/24/25    Expected End:  03/24/25    Resolved:  05/22/25         Pt will increase pain free shoulder elevation PROM to >/= 160 deg to improve functional mobility of UE (Met)       Start:  02/24/25    Expected End:  03/24/25    Resolved:  05/22/25         Pt will increase shoulder ER PROM in 90 deg abduction to >/=20 deg to improve functional mobility of UE (Met)       Start:  02/24/25    Expected End:  03/24/25    Resolved:  05/22/25         Pt will increase elbow AAROM to WNL deg in 4 weeks to improve functional mobility of UE. (Met)       Start:  02/24/25    Expected End:  03/24/25    Resolved:  05/22/25             Delfino Quan, PT

## 2025-07-09 ENCOUNTER — CLINICAL SUPPORT (OUTPATIENT)
Dept: REHABILITATION | Facility: HOSPITAL | Age: 70
End: 2025-07-09
Payer: MEDICARE

## 2025-07-09 DIAGNOSIS — M25.612 DECREASED RANGE OF MOTION OF LEFT SHOULDER: Primary | ICD-10-CM

## 2025-07-09 DIAGNOSIS — R29.898 DECREASED STRENGTH OF UPPER EXTREMITY: ICD-10-CM

## 2025-07-09 PROCEDURE — 97112 NEUROMUSCULAR REEDUCATION: CPT | Mod: HCNC

## 2025-07-09 NOTE — PROGRESS NOTES
Outpatient Rehab    Physical Therapy Visit    Patient Name: Ron Christina  MRN: [id number]  YOB: 1955  Encounter Date: 7/9/2025    Therapy Diagnosis:   Encounter Diagnoses   Name Primary?    Decreased range of motion of left shoulder Yes    Decreased strength of upper extremity      Physician: Alma Hathaway*    Physician Orders: Eval and Treat  Medical Diagnosis:     Visit # / Visits Authorized:  34 / 40  Insurance Authorization Period: 2/26/2025 to 12/31/2025  Date of Evaluation: 2/24/2025  Plan of Care Certification: 2/24/2025 to 8/14/2025      Time In:   9:00 am  Time Out:  9:30am  Total Time (in minutes):   30  Total Billable Time (in minutes):  15    FOTO:  Intake Score:  %  Survey Score 2:  %  Survey Score 3:  %    Precautions:       Subjective          Shoulder is feeling good. Needs a shorter session today because he has to go pick something up for his wife.    Objective          Shoulder Passive Range of Motion: 6/19/2025  Shoulder Right Left   ER at 0    85 79   ER at 90    110 85   IR at 90    80 75       Shoulder Active Range of Motion:7/7/2025  Shoulder Right Left   Flexion   170 Pre-160  Post-170   STACI T4 T1   FIR T7 T12              Treatment     Ron received the treatments listed below:      therapeutic exercises to develop strength, endurance, ROM, flexibility, posture, and core stabilization for 8 minutes including:    Arm bike for tissue extensibility 4 min fwd/ 4min bck    NT:  Wrist [id number]  Elbow arom 20x(pro, elke, sup)  Pendelum hangs 1 min 3x  manual therapy techniques: Joint mobilizations, Manual traction, Myofacial release, Manual Lymphatic Drainage, Soft tissue Mobilization, and Friction Massage were applied to the: shoulder for 15 minutes, including:  Grade III post/inferior mobs  Gentle oscillations  Lat contract/relax  reassessment    neuromuscular re-education activities to improve: Balance, Coordination, Kinesthetic, Sense, Proprioception, and Posture for  37 minutes. The following activities were included:  Seated CC 10# pull down with eccentric emphasis 3x12  OTB Genie ER A@ 90 FL into overhead press 3x10    NT:  Sidelying dowel assist flexion 2x15  Landmine dowel press 2x15      therapeutic activities to improve functional performance for 0  minutes, including:       Time Entry(in minutes):       Assessment & Plan   Assessment:     Pt presents with coninued excellent arom for this stage of rehab. Poor carryover from previous session, but excellent improvement pre to post session. Progressed RC activation w/ complex movements today, though decrease in volume due to abbreviated session. Will continue to progress as tolerated.     The patient will continue to benefit from skilled outpatient physical therapy in order to address the deficits listed in the problem list on the initial evaluation, provide patient and family education, and maximize the patients level of independence in the home and community environments.     The patient's spiritual, cultural, and educational needs were considered, and the patient is agreeable to the plan of care and goals.           Plan:   Continue POC    Goals:   Active       Long Term Goals       Pt will improve FOTO score to </=see FOTO% limited to decrease perceived limitation with carrying, moving, and handling objects. (Progressing)       Start:  02/24/25    Expected End:  08/11/25            Pt will increase  shoulder AROM to WFL in all planes to improve functional use of R RUE. (Met)       Start:  02/24/25    Expected End:  08/11/25    Resolved:  05/22/25         Pt will improve  shoulder MMTs to = 4+/5 to promote equal use of B UEs in performing functional tasks. (Progressing)       Start:  02/24/25    Expected End:  08/11/25            Pt will lift 10 lb objects without pain to promote functional QOL. (Progressing)       Start:  02/24/25    Expected End:  08/11/25            Pt to report pain </= 0/10 (Progressing)       Start:   02/24/25    Expected End:  08/11/25              Resolved       Short Term Goals        Pt will be independent with HEP to supplement PT in improving functional use of L UE. (Met)       Start:  02/24/25    Expected End:  03/24/25    Resolved:  05/22/25         Pt will increase pain free shoulder elevation PROM to >/= 160 deg to improve functional mobility of UE (Met)       Start:  02/24/25    Expected End:  03/24/25    Resolved:  05/22/25         Pt will increase shoulder ER PROM in 90 deg abduction to >/=20 deg to improve functional mobility of UE (Met)       Start:  02/24/25    Expected End:  03/24/25    Resolved:  05/22/25         Pt will increase elbow AAROM to WNL deg in 4 weeks to improve functional mobility of UE. (Met)       Start:  02/24/25    Expected End:  03/24/25    Resolved:  05/22/25             Delfino Quan, PT

## 2025-07-15 ENCOUNTER — TELEPHONE (OUTPATIENT)
Dept: SPORTS MEDICINE | Facility: CLINIC | Age: 70
End: 2025-07-15
Payer: MEDICARE

## 2025-07-15 NOTE — TELEPHONE ENCOUNTER
Spoke c pt. Informed pt that Dr. Brown will no longer be seeing pts at scheduled appt time on 07/22/25. R/s appt to 07/24/25. Confirmed appt date, time, location. Pt will call c additional questions/concerns in interim. Pt expressed understanding & was thankful.

## 2025-07-22 ENCOUNTER — CLINICAL SUPPORT (OUTPATIENT)
Dept: REHABILITATION | Facility: HOSPITAL | Age: 70
End: 2025-07-22
Payer: MEDICARE

## 2025-07-22 DIAGNOSIS — R29.898 DECREASED STRENGTH OF UPPER EXTREMITY: ICD-10-CM

## 2025-07-22 DIAGNOSIS — M25.612 DECREASED RANGE OF MOTION OF LEFT SHOULDER: Primary | ICD-10-CM

## 2025-07-22 PROCEDURE — 97110 THERAPEUTIC EXERCISES: CPT | Mod: HCNC

## 2025-07-22 PROCEDURE — 97112 NEUROMUSCULAR REEDUCATION: CPT | Mod: HCNC

## 2025-07-22 NOTE — PROGRESS NOTES
Outpatient Rehab    Physical Therapy Visit    Patient Name: Ron Christina  MRN: [id number]  YOB: 1955  Encounter Date: 7/22/2025    Therapy Diagnosis:   Encounter Diagnoses   Name Primary?    Decreased range of motion of left shoulder Yes    Decreased strength of upper extremity      Physician: Alma Hathaway*    Physician Orders: Eval and Treat  Medical Diagnosis:     Visit # / Visits Authorized:  35 / 40  Insurance Authorization Period: 2/26/2025 to 12/31/2025  Date of Evaluation: 2/24/2025  Plan of Care Certification: 2/24/2025 to 8/14/2025      Time In:   11:00 am  Time Out:  12:00pm  Total Time (in minutes):   60  Total Billable Time (in minutes):  30    FOTO:  Intake Score:  %  Survey Score 2:  %  Survey Score 3:  %    Precautions:       Subjective          Shoulder is feeling okay, hasn't been good about doing his exercises because he's been at the beach    Objective          Shoulder Passive Range of Motion: 6/19/2025  Shoulder Right Left   ER at 0    85 79   ER at 90    110 85   IR at 90    80 75       Shoulder Active Range of Motion:7/22/2025  Shoulder Right Left   Flexion   170 Pre-160  Post-170   STACI T4 T1   FIR T7 T12              Treatment     Ron received the treatments listed below:      therapeutic exercises to develop strength, endurance, ROM, flexibility, posture, and core stabilization for 8 minutes including:    Arm bike for tissue extensibility 4 min fwd/ 4min bck    NT:  Wrist [id number]  Elbow arom 20x(pro, elke, sup)  Pendelum hangs 1 min 3x  manual therapy techniques: Joint mobilizations, Manual traction, Myofacial release, Manual Lymphatic Drainage, Soft tissue Mobilization, and Friction Massage were applied to the: shoulder for 15 minutes, including:  Grade III post/inferior mobs  Gentle oscillations  Lat contract/relax  reassessment    neuromuscular re-education activities to improve: Balance, Coordination, Kinesthetic, Sense, Proprioception, and Posture for 37  minutes. The following activities were included:  Seated CC 10# pull down with eccentric emphasis 3x12  OTB Genie ER 3x12  2GTB genie IR iso walkouts 3x10  Robots at wall 3x10    NT:  Sidelying dowel assist flexion 2x15  Landmine dowel press 2x15      therapeutic activities to improve functional performance for 0  minutes, including:       Time Entry(in minutes):       Assessment & Plan   Assessment:     Pt presents with coninued excellent arom for this stage of rehab. Poor carryover from previous session, but excellent improvement pre to post session. Min next day significant soreness noted after last treatment session. Regressed complex overhead movement additions from previous session with appropriate muscular fatigue. Will continue to progress as tolerated.     The patient will continue to benefit from skilled outpatient physical therapy in order to address the deficits listed in the problem list on the initial evaluation, provide patient and family education, and maximize the patients level of independence in the home and community environments.     The patient's spiritual, cultural, and educational needs were considered, and the patient is agreeable to the plan of care and goals.           Plan:   Continue POC    Goals:   Active       Long Term Goals       Pt will improve FOTO score to </=see FOTO% limited to decrease perceived limitation with carrying, moving, and handling objects. (Progressing)       Start:  02/24/25    Expected End:  08/11/25            Pt will increase  shoulder AROM to WFL in all planes to improve functional use of R RUE. (Met)       Start:  02/24/25    Expected End:  08/11/25    Resolved:  05/22/25         Pt will improve  shoulder MMTs to = 4+/5 to promote equal use of B UEs in performing functional tasks. (Progressing)       Start:  02/24/25    Expected End:  08/11/25            Pt will lift 10 lb objects without pain to promote functional QOL. (Progressing)       Start:  02/24/25     Expected End:  08/11/25            Pt to report pain </= 0/10 (Progressing)       Start:  02/24/25    Expected End:  08/11/25              Resolved       Short Term Goals        Pt will be independent with HEP to supplement PT in improving functional use of L UE. (Met)       Start:  02/24/25    Expected End:  03/24/25    Resolved:  05/22/25         Pt will increase pain free shoulder elevation PROM to >/= 160 deg to improve functional mobility of UE (Met)       Start:  02/24/25    Expected End:  03/24/25    Resolved:  05/22/25         Pt will increase shoulder ER PROM in 90 deg abduction to >/=20 deg to improve functional mobility of UE (Met)       Start:  02/24/25    Expected End:  03/24/25    Resolved:  05/22/25         Pt will increase elbow AAROM to WNL deg in 4 weeks to improve functional mobility of UE. (Met)       Start:  02/24/25    Expected End:  03/24/25    Resolved:  05/22/25             Delfino Quan, PT

## 2025-08-01 DIAGNOSIS — I82.409 RECURRENT DEEP VEIN THROMBOSIS (DVT): Chronic | ICD-10-CM

## 2025-08-04 RX ORDER — APIXABAN 2.5 MG/1
2.5 TABLET, FILM COATED ORAL 2 TIMES DAILY
Qty: 60 TABLET | Refills: 5 | Status: SHIPPED | OUTPATIENT
Start: 2025-08-04

## 2025-08-06 ENCOUNTER — LAB VISIT (OUTPATIENT)
Dept: LAB | Facility: HOSPITAL | Age: 70
End: 2025-08-06
Attending: INTERNAL MEDICINE
Payer: MEDICARE

## 2025-08-06 DIAGNOSIS — I82.409 RECURRENT DEEP VEIN THROMBOSIS (DVT): ICD-10-CM

## 2025-08-06 LAB
ABSOLUTE EOSINOPHIL (OHS): 0.26 K/UL
ABSOLUTE MONOCYTE (OHS): 0.59 K/UL (ref 0.3–1)
ABSOLUTE NEUTROPHIL COUNT (OHS): 4.05 K/UL (ref 1.8–7.7)
ALBUMIN SERPL BCP-MCNC: 4 G/DL (ref 3.5–5.2)
ALP SERPL-CCNC: 58 UNIT/L (ref 40–150)
ALT SERPL W/O P-5'-P-CCNC: 25 UNIT/L (ref 0–55)
ANION GAP (OHS): 9 MMOL/L (ref 8–16)
AST SERPL-CCNC: 25 UNIT/L (ref 0–50)
BASOPHILS # BLD AUTO: 0.06 K/UL
BASOPHILS NFR BLD AUTO: 0.8 %
BILIRUB SERPL-MCNC: 0.6 MG/DL (ref 0.1–1)
BUN SERPL-MCNC: 20 MG/DL (ref 8–23)
CALCIUM SERPL-MCNC: 9.3 MG/DL (ref 8.7–10.5)
CHLORIDE SERPL-SCNC: 105 MMOL/L (ref 95–110)
CO2 SERPL-SCNC: 23 MMOL/L (ref 23–29)
CREAT SERPL-MCNC: 0.8 MG/DL (ref 0.5–1.4)
ERYTHROCYTE [DISTWIDTH] IN BLOOD BY AUTOMATED COUNT: 12.9 % (ref 11.5–14.5)
GFR SERPLBLD CREATININE-BSD FMLA CKD-EPI: >60 ML/MIN/1.73/M2
GLUCOSE SERPL-MCNC: 104 MG/DL (ref 70–110)
HCT VFR BLD AUTO: 47.7 % (ref 40–54)
HGB BLD-MCNC: 16.2 GM/DL (ref 14–18)
IMM GRANULOCYTES # BLD AUTO: 0.05 K/UL (ref 0–0.04)
IMM GRANULOCYTES NFR BLD AUTO: 0.7 % (ref 0–0.5)
LYMPHOCYTES # BLD AUTO: 2.52 K/UL (ref 1–4.8)
MCH RBC QN AUTO: 33.1 PG (ref 27–31)
MCHC RBC AUTO-ENTMCNC: 34 G/DL (ref 32–36)
MCV RBC AUTO: 97 FL (ref 82–98)
NUCLEATED RBC (/100WBC) (OHS): 0 /100 WBC
PLATELET # BLD AUTO: 220 K/UL (ref 150–450)
PMV BLD AUTO: 10.7 FL (ref 9.2–12.9)
POTASSIUM SERPL-SCNC: 4.4 MMOL/L (ref 3.5–5.1)
PROT SERPL-MCNC: 7.2 GM/DL (ref 6–8.4)
RBC # BLD AUTO: 4.9 M/UL (ref 4.6–6.2)
RELATIVE EOSINOPHIL (OHS): 3.5 %
RELATIVE LYMPHOCYTE (OHS): 33.5 % (ref 18–48)
RELATIVE MONOCYTE (OHS): 7.8 % (ref 4–15)
RELATIVE NEUTROPHIL (OHS): 53.7 % (ref 38–73)
SODIUM SERPL-SCNC: 137 MMOL/L (ref 136–145)
WBC # BLD AUTO: 7.53 K/UL (ref 3.9–12.7)

## 2025-08-06 PROCEDURE — 82040 ASSAY OF SERUM ALBUMIN: CPT | Mod: HCNC

## 2025-08-06 PROCEDURE — 85025 COMPLETE CBC W/AUTO DIFF WBC: CPT | Mod: HCNC

## 2025-08-06 PROCEDURE — 36415 COLL VENOUS BLD VENIPUNCTURE: CPT | Mod: HCNC,PO

## 2025-08-09 ENCOUNTER — PATIENT MESSAGE (OUTPATIENT)
Dept: HEMATOLOGY/ONCOLOGY | Facility: CLINIC | Age: 70
End: 2025-08-09
Payer: MEDICARE

## 2025-08-11 ENCOUNTER — PATIENT MESSAGE (OUTPATIENT)
Dept: HEMATOLOGY/ONCOLOGY | Facility: CLINIC | Age: 70
End: 2025-08-11
Payer: MEDICARE

## 2025-08-12 ENCOUNTER — CLINICAL SUPPORT (OUTPATIENT)
Dept: REHABILITATION | Facility: HOSPITAL | Age: 70
End: 2025-08-12
Payer: MEDICARE

## 2025-08-12 DIAGNOSIS — R29.898 DECREASED STRENGTH OF UPPER EXTREMITY: ICD-10-CM

## 2025-08-12 DIAGNOSIS — M25.612 DECREASED RANGE OF MOTION OF LEFT SHOULDER: Primary | ICD-10-CM

## 2025-08-12 PROCEDURE — 97110 THERAPEUTIC EXERCISES: CPT | Mod: HCNC

## 2025-08-12 PROCEDURE — 97112 NEUROMUSCULAR REEDUCATION: CPT | Mod: HCNC

## 2025-08-19 ENCOUNTER — OFFICE VISIT (OUTPATIENT)
Dept: HEMATOLOGY/ONCOLOGY | Facility: CLINIC | Age: 70
End: 2025-08-19
Payer: MEDICARE

## 2025-08-19 ENCOUNTER — CLINICAL SUPPORT (OUTPATIENT)
Dept: REHABILITATION | Facility: HOSPITAL | Age: 70
End: 2025-08-19
Payer: MEDICARE

## 2025-08-19 VITALS
SYSTOLIC BLOOD PRESSURE: 145 MMHG | OXYGEN SATURATION: 96 % | TEMPERATURE: 99 F | HEART RATE: 73 BPM | WEIGHT: 238.13 LBS | BODY MASS INDEX: 32.25 KG/M2 | DIASTOLIC BLOOD PRESSURE: 91 MMHG | HEIGHT: 72 IN

## 2025-08-19 DIAGNOSIS — I82.409 RECURRENT DEEP VEIN THROMBOSIS (DVT): Primary | Chronic | ICD-10-CM

## 2025-08-19 DIAGNOSIS — Z79.01 CHRONIC ANTICOAGULATION: ICD-10-CM

## 2025-08-19 DIAGNOSIS — R29.898 DECREASED STRENGTH OF UPPER EXTREMITY: ICD-10-CM

## 2025-08-19 DIAGNOSIS — M25.612 DECREASED RANGE OF MOTION OF LEFT SHOULDER: Primary | ICD-10-CM

## 2025-08-19 PROCEDURE — 97110 THERAPEUTIC EXERCISES: CPT | Mod: HCNC

## 2025-08-19 PROCEDURE — 99214 OFFICE O/P EST MOD 30 MIN: CPT | Mod: HCNC,S$GLB,, | Performed by: INTERNAL MEDICINE

## 2025-08-19 PROCEDURE — 3080F DIAST BP >= 90 MM HG: CPT | Mod: CPTII,HCNC,S$GLB, | Performed by: INTERNAL MEDICINE

## 2025-08-19 PROCEDURE — 97112 NEUROMUSCULAR REEDUCATION: CPT | Mod: HCNC

## 2025-08-19 PROCEDURE — 97140 MANUAL THERAPY 1/> REGIONS: CPT | Mod: HCNC

## 2025-08-19 PROCEDURE — 99999 PR PBB SHADOW E&M-EST. PATIENT-LVL III: CPT | Mod: PBBFAC,HCNC,, | Performed by: INTERNAL MEDICINE

## 2025-08-19 PROCEDURE — 1101F PT FALLS ASSESS-DOCD LE1/YR: CPT | Mod: CPTII,HCNC,S$GLB, | Performed by: INTERNAL MEDICINE

## 2025-08-19 PROCEDURE — 3077F SYST BP >= 140 MM HG: CPT | Mod: CPTII,HCNC,S$GLB, | Performed by: INTERNAL MEDICINE

## 2025-08-19 PROCEDURE — 1159F MED LIST DOCD IN RCRD: CPT | Mod: CPTII,HCNC,S$GLB, | Performed by: INTERNAL MEDICINE

## 2025-08-19 PROCEDURE — 3008F BODY MASS INDEX DOCD: CPT | Mod: CPTII,HCNC,S$GLB, | Performed by: INTERNAL MEDICINE

## 2025-08-19 PROCEDURE — 1126F AMNT PAIN NOTED NONE PRSNT: CPT | Mod: CPTII,HCNC,S$GLB, | Performed by: INTERNAL MEDICINE

## 2025-08-19 PROCEDURE — 3288F FALL RISK ASSESSMENT DOCD: CPT | Mod: CPTII,HCNC,S$GLB, | Performed by: INTERNAL MEDICINE

## 2025-08-21 ENCOUNTER — OFFICE VISIT (OUTPATIENT)
Dept: SPORTS MEDICINE | Facility: CLINIC | Age: 70
End: 2025-08-21
Payer: MEDICARE

## 2025-08-21 VITALS
WEIGHT: 238.13 LBS | DIASTOLIC BLOOD PRESSURE: 87 MMHG | BODY MASS INDEX: 32.29 KG/M2 | HEART RATE: 69 BPM | SYSTOLIC BLOOD PRESSURE: 150 MMHG

## 2025-08-21 DIAGNOSIS — M25.611 SHOULDER STIFFNESS, RIGHT: Primary | ICD-10-CM

## 2025-08-21 PROCEDURE — 99213 OFFICE O/P EST LOW 20 MIN: CPT | Mod: HCNC,S$GLB,, | Performed by: ORTHOPAEDIC SURGERY

## 2025-08-21 PROCEDURE — 1126F AMNT PAIN NOTED NONE PRSNT: CPT | Mod: CPTII,HCNC,S$GLB, | Performed by: ORTHOPAEDIC SURGERY

## 2025-08-21 PROCEDURE — 3008F BODY MASS INDEX DOCD: CPT | Mod: CPTII,HCNC,S$GLB, | Performed by: ORTHOPAEDIC SURGERY

## 2025-08-21 PROCEDURE — 3079F DIAST BP 80-89 MM HG: CPT | Mod: CPTII,HCNC,S$GLB, | Performed by: ORTHOPAEDIC SURGERY

## 2025-08-21 PROCEDURE — 99999 PR PBB SHADOW E&M-EST. PATIENT-LVL III: CPT | Mod: PBBFAC,HCNC,, | Performed by: ORTHOPAEDIC SURGERY

## 2025-08-21 PROCEDURE — 3077F SYST BP >= 140 MM HG: CPT | Mod: CPTII,HCNC,S$GLB, | Performed by: ORTHOPAEDIC SURGERY

## 2025-08-21 PROCEDURE — 1159F MED LIST DOCD IN RCRD: CPT | Mod: CPTII,HCNC,S$GLB, | Performed by: ORTHOPAEDIC SURGERY

## 2025-08-21 PROCEDURE — 1101F PT FALLS ASSESS-DOCD LE1/YR: CPT | Mod: CPTII,HCNC,S$GLB, | Performed by: ORTHOPAEDIC SURGERY

## 2025-08-21 PROCEDURE — 3288F FALL RISK ASSESSMENT DOCD: CPT | Mod: CPTII,HCNC,S$GLB, | Performed by: ORTHOPAEDIC SURGERY

## (undated) DEVICE — TUBING SUC UNIV W/CONN 12FT

## (undated) DEVICE — SYR 30CC LUER LOCK

## (undated) DEVICE — GOWN ECLIPSE REINF LV4 XLNG XL

## (undated) DEVICE — ADAPTER DUAL SPIKE ARTHSCP

## (undated) DEVICE — BNDG COFLEX FOAM LF2 ST 6X5YD

## (undated) DEVICE — SOL NACL IRR 3000ML

## (undated) DEVICE — BLADE POWERASP 4.0MMX13CM

## (undated) DEVICE — SUPPORT SLING SHOT II MEDIUM

## (undated) DEVICE — PAD SHOULDER POLAR CARE XL

## (undated) DEVICE — GLOVE BIOGEL PI MICRO INDIC 7

## (undated) DEVICE — SUT FIBERLINK TAPE BLU WHT 1.3

## (undated) DEVICE — BNDG COFLEX FOAM LF2 ST 4X5YD

## (undated) DEVICE — COVER CAMERA OPERATING ROOM

## (undated) DEVICE — CANNULA PASSPORT 8 MM X 4CM.

## (undated) DEVICE — KIT SHOULDER POSITIONER SPIDER

## (undated) DEVICE — GLOVE SENSICARE PI GRN 7.5

## (undated) DEVICE — DRESSING XEROFORM NONADH 1X8IN

## (undated) DEVICE — Device

## (undated) DEVICE — GLOVE BIOGEL SKINSENSE PI 8.0

## (undated) DEVICE — UNDERGLOVES BIOGEL PI SIZE 8.5

## (undated) DEVICE — DRAPE STERI INSTRUMENT 1018

## (undated) DEVICE — GLOVE SENSICARE PI SURG 7

## (undated) DEVICE — CLOSURE SKIN STERI STRIP 1/2X4

## (undated) DEVICE — NDL SCORPION HD MEGALOADER

## (undated) DEVICE — CANNULA TWIST IN 7MM X 7CM

## (undated) DEVICE — DRESSING AQUACEL AG SILVER 4X4

## (undated) DEVICE — BLADE SHAVER LANZA 4.2X13CM

## (undated) DEVICE — MARKER SKIN RULER STERILE

## (undated) DEVICE — GLOVE BIOGEL PI MICRO SZ 7

## (undated) DEVICE — COVER LIGHT HANDLE 80/CA

## (undated) DEVICE — DRAPE STERI-DRAPE 1000 17X11IN

## (undated) DEVICE — NDL HYPO STD REG BVL 18GX1.5IN

## (undated) DEVICE — ADHESIVE MASTISOL VIAL 48/BX

## (undated) DEVICE — DRAPE STERI U-SHAPED 47X51IN

## (undated) DEVICE — SUT PDS II 0 CT-1 VIL MONO

## (undated) DEVICE — BLADE VORTEX SHAVER 4.5MMX13CM

## (undated) DEVICE — PROBE ARTHO ENERGY 90 DEG

## (undated) DEVICE — SUT SUTURETAPE TIGERLINK 1.3MM